# Patient Record
Sex: FEMALE | Race: WHITE | NOT HISPANIC OR LATINO | Employment: PART TIME | URBAN - METROPOLITAN AREA
[De-identification: names, ages, dates, MRNs, and addresses within clinical notes are randomized per-mention and may not be internally consistent; named-entity substitution may affect disease eponyms.]

---

## 2018-09-25 ENCOUNTER — TRANSCRIBE ORDERS (OUTPATIENT)
Dept: ADMINISTRATIVE | Facility: HOSPITAL | Age: 48
End: 2018-09-25

## 2018-09-25 DIAGNOSIS — F33.1 MAJOR DEPRESSIVE DISORDER, RECURRENT EPISODE, MODERATE (HCC): Primary | ICD-10-CM

## 2018-09-25 DIAGNOSIS — Z79.899 DRUG THERAPY: ICD-10-CM

## 2018-11-02 LAB — HBA1C MFR BLD HPLC: 5.6 %

## 2018-11-12 ENCOUNTER — TRANSCRIBE ORDERS (OUTPATIENT)
Dept: ADMINISTRATIVE | Facility: HOSPITAL | Age: 48
End: 2018-11-12

## 2018-11-12 DIAGNOSIS — Z12.39 SCREENING BREAST EXAMINATION: Primary | ICD-10-CM

## 2018-12-21 ENCOUNTER — HOSPITAL ENCOUNTER (EMERGENCY)
Facility: HOSPITAL | Age: 48
Discharge: HOME | End: 2018-12-21
Attending: EMERGENCY MEDICINE | Admitting: EMERGENCY MEDICINE
Payer: COMMERCIAL

## 2018-12-21 ENCOUNTER — APPOINTMENT (EMERGENCY)
Dept: RADIOLOGY | Facility: HOSPITAL | Age: 48
End: 2018-12-21
Payer: COMMERCIAL

## 2018-12-21 VITALS
TEMPERATURE: 97.6 F | DIASTOLIC BLOOD PRESSURE: 52 MMHG | HEART RATE: 71 BPM | WEIGHT: 122 LBS | RESPIRATION RATE: 20 BRPM | OXYGEN SATURATION: 99 % | SYSTOLIC BLOOD PRESSURE: 120 MMHG

## 2018-12-21 DIAGNOSIS — M54.9 BACK PAIN: Primary | ICD-10-CM

## 2018-12-21 LAB
ALBUMIN SERPL BCP-MCNC: 3.6 G/DL (ref 3.5–5)
ALP SERPL-CCNC: 44 U/L (ref 46–116)
ALT SERPL W P-5'-P-CCNC: 22 U/L (ref 12–78)
ANION GAP SERPL CALCULATED.3IONS-SCNC: 9 MMOL/L (ref 4–13)
AST SERPL W P-5'-P-CCNC: 17 U/L (ref 5–45)
BASOPHILS # BLD AUTO: 0.02 THOUSANDS/ΜL (ref 0–0.1)
BASOPHILS NFR BLD AUTO: 0 % (ref 0–1)
BILIRUB DIRECT SERPL-MCNC: 0.1 MG/DL (ref 0–0.2)
BILIRUB SERPL-MCNC: 0.3 MG/DL (ref 0.2–1)
BUN SERPL-MCNC: 17 MG/DL (ref 5–25)
CALCIUM SERPL-MCNC: 9.2 MG/DL (ref 8.3–10.1)
CHLORIDE SERPL-SCNC: 103 MMOL/L (ref 100–108)
CO2 SERPL-SCNC: 26 MMOL/L (ref 21–32)
CREAT SERPL-MCNC: 0.87 MG/DL (ref 0.6–1.3)
EOSINOPHIL # BLD AUTO: 0.12 THOUSAND/ΜL (ref 0–0.61)
EOSINOPHIL NFR BLD AUTO: 2 % (ref 0–6)
ERYTHROCYTE [DISTWIDTH] IN BLOOD BY AUTOMATED COUNT: 12.2 % (ref 11.6–15.1)
GFR SERPL CREATININE-BSD FRML MDRD: 78 ML/MIN/1.73SQ M
GLUCOSE SERPL-MCNC: 99 MG/DL (ref 65–140)
HCT VFR BLD AUTO: 40.3 % (ref 34.8–46.1)
HGB BLD-MCNC: 13.4 G/DL (ref 11.5–15.4)
IMM GRANULOCYTES # BLD AUTO: 0.02 THOUSAND/UL (ref 0–0.2)
IMM GRANULOCYTES NFR BLD AUTO: 0 % (ref 0–2)
LIPASE SERPL-CCNC: 385 U/L (ref 73–393)
LYMPHOCYTES # BLD AUTO: 1.3 THOUSANDS/ΜL (ref 0.6–4.47)
LYMPHOCYTES NFR BLD AUTO: 21 % (ref 14–44)
MAGNESIUM SERPL-MCNC: 1.9 MG/DL (ref 1.6–2.6)
MCH RBC QN AUTO: 32.4 PG (ref 26.8–34.3)
MCHC RBC AUTO-ENTMCNC: 33.3 G/DL (ref 31.4–37.4)
MCV RBC AUTO: 97 FL (ref 82–98)
MONOCYTES # BLD AUTO: 0.59 THOUSAND/ΜL (ref 0.17–1.22)
MONOCYTES NFR BLD AUTO: 9 % (ref 4–12)
NEUTROPHILS # BLD AUTO: 4.26 THOUSANDS/ΜL (ref 1.85–7.62)
NEUTS SEG NFR BLD AUTO: 68 % (ref 43–75)
NRBC BLD AUTO-RTO: 0 /100 WBCS
PLATELET # BLD AUTO: 217 THOUSANDS/UL (ref 149–390)
PMV BLD AUTO: 9.9 FL (ref 8.9–12.7)
POTASSIUM SERPL-SCNC: 4.6 MMOL/L (ref 3.5–5.3)
PROT SERPL-MCNC: 6.8 G/DL (ref 6.4–8.2)
RBC # BLD AUTO: 4.14 MILLION/UL (ref 3.81–5.12)
SODIUM SERPL-SCNC: 138 MMOL/L (ref 136–145)
TSH SERPL DL<=0.05 MIU/L-ACNC: 3.54 UIU/ML (ref 0.36–3.74)
WBC # BLD AUTO: 6.31 THOUSAND/UL (ref 4.31–10.16)

## 2018-12-21 PROCEDURE — 83735 ASSAY OF MAGNESIUM: CPT | Performed by: EMERGENCY MEDICINE

## 2018-12-21 PROCEDURE — 85025 COMPLETE CBC W/AUTO DIFF WBC: CPT | Performed by: EMERGENCY MEDICINE

## 2018-12-21 PROCEDURE — 74177 CT ABD & PELVIS W/CONTRAST: CPT

## 2018-12-21 PROCEDURE — 84443 ASSAY THYROID STIM HORMONE: CPT | Performed by: EMERGENCY MEDICINE

## 2018-12-21 PROCEDURE — 80076 HEPATIC FUNCTION PANEL: CPT | Performed by: EMERGENCY MEDICINE

## 2018-12-21 PROCEDURE — 71260 CT THORAX DX C+: CPT

## 2018-12-21 PROCEDURE — 36415 COLL VENOUS BLD VENIPUNCTURE: CPT | Performed by: EMERGENCY MEDICINE

## 2018-12-21 PROCEDURE — 80048 BASIC METABOLIC PNL TOTAL CA: CPT | Performed by: EMERGENCY MEDICINE

## 2018-12-21 PROCEDURE — 83690 ASSAY OF LIPASE: CPT | Performed by: EMERGENCY MEDICINE

## 2018-12-21 PROCEDURE — 96360 HYDRATION IV INFUSION INIT: CPT

## 2018-12-21 PROCEDURE — 99284 EMERGENCY DEPT VISIT MOD MDM: CPT

## 2018-12-21 RX ORDER — NAPROXEN 500 MG/1
500 TABLET ORAL 2 TIMES DAILY WITH MEALS
Qty: 10 TABLET | Refills: 0 | Status: SHIPPED | OUTPATIENT
Start: 2018-12-21 | End: 2018-12-30

## 2018-12-21 RX ORDER — CLONIDINE HYDROCHLORIDE 0.1 MG/1
0.1 TABLET ORAL AS NEEDED
COMMUNITY
End: 2020-02-24

## 2018-12-21 RX ORDER — NAPROXEN 500 MG/1
500 TABLET ORAL ONCE
Status: COMPLETED | OUTPATIENT
Start: 2018-12-21 | End: 2018-12-21

## 2018-12-21 RX ORDER — TRAMADOL HYDROCHLORIDE 50 MG/1
50 TABLET ORAL EVERY 6 HOURS PRN
Qty: 10 TABLET | Refills: 0 | Status: SHIPPED | OUTPATIENT
Start: 2018-12-21 | End: 2018-12-24

## 2018-12-21 RX ADMIN — IOHEXOL 100 ML: 350 INJECTION, SOLUTION INTRAVENOUS at 10:33

## 2018-12-21 RX ADMIN — SODIUM CHLORIDE 1000 ML: 0.9 INJECTION, SOLUTION INTRAVENOUS at 08:38

## 2018-12-21 RX ADMIN — NAPROXEN 500 MG: 500 TABLET ORAL at 11:35

## 2018-12-21 NOTE — DISCHARGE INSTRUCTIONS
Acute Low Back Pain   WHAT YOU NEED TO KNOW:   Acute low back pain is sudden discomfort in your lower back area that lasts for up to 6 weeks  The discomfort makes it difficult to tolerate activity  DISCHARGE INSTRUCTIONS:   Seek care immediately or call 911 if:   · You have severe pain  · You have sudden stiffness and heaviness on both buttocks down to both legs  · You have numbness or weakness in one leg, or pain in both legs  · You have numbness in your genital area or across your lower back  · You cannot control your urine or bowel movements  Contact your healthcare provider if:   · You have a fever  · You have pain at night or when you rest     · Your pain does not get better with treatment  · You have pain that worsens when you cough or sneeze  · You suddenly feel something pop or snap in your back  · You have questions or concerns about your condition or care  Medicines: The following medicines may be ordered by your healthcare provider:  · Acetaminophen  decreases pain  It is available without a doctor's order  Ask how much to take and how often to take it  Follow directions  Acetaminophen can cause liver damage if not taken correctly  · NSAIDs  help decrease swelling and pain  This medicine is available with or without a doctor's order  NSAIDs can cause stomach bleeding or kidney problems in certain people  If you take blood thinner medicine, always ask your healthcare provider if NSAIDs are safe for you  Always read the medicine label and follow directions  · Prescription pain medicine  may be given  Ask your healthcare provider how to take this medicine safely  · Muscle relaxers  decrease pain by relaxing the muscles in your lower spine  · Take your medicine as directed  Contact your healthcare provider if you think your medicine is not helping or if you have side effects  Tell him of her if you are allergic to any medicine   Keep a list of the medicines, vitamins, and herbs you take  Include the amounts, and when and why you take them  Bring the list or the pill bottles to follow-up visits  Carry your medicine list with you in case of an emergency  Self-care:   · Stay active  as much as you can without causing more pain  Bed rest could make your back pain worse  Start with some light exercises such as walking  Avoid heavy lifting until your pain is gone  Ask for more information about the activities or exercises that are right for you  · Ice  helps decrease swelling, pain, and muscle spams  Put crushed ice in a plastic bag  Cover it with a towel  Place it on your lower back for 20 to 30 minutes every 2 hours  Do this for about 2 to 3 days after your pain starts, or as directed  · Heat  helps decrease pain and muscle spasms  Start to use heat after treatment with ice has stopped  Use a small towel dampened with warm water or a heating pad, or sit in a warm bath  Apply heat on the area for 20 to 30 minutes every 2 hours for as many days as directed  Alternate heat and ice  Prevent acute low back pain:   · Use proper body mechanics  ¨ Bend at the hips and knees when you  objects  Do not bend from the waist  Use your leg muscles as you lift the load  Do not use your back  Keep the object close to your chest as you lift it  Try not to twist or lift anything above your waist     ¨ Change your position often when you stand for long periods of time  Rest one foot on a small box or footrest, and then switch to the other foot often  ¨ Try not to sit for long periods of time  When you do, sit in a straight-backed chair with your feet flat on the floor  Never reach, pull, or push while you are sitting  · Do exercises that strengthen your back muscles  Warm up before you exercise  Ask your healthcare provider the best exercises for you  · Maintain a healthy weight  Ask your healthcare provider how much you should weigh   Ask him to help you create a weight loss plan if you are overweight  Follow up with your healthcare provider as directed:  Return for a follow-up visit if you still have pain after 1 to 3 weeks of treatment  You may need to visit an orthopedist if your back pain lasts more than 6 to 12 weeks  Write down your questions so you remember to ask them during your visits  © 2017 [address] Information is for End User's use only and may not be sold, redistributed or otherwise used for commercial purposes  All illustrations and images included in CareNotes® are the copyrighted property of A D A M [employer] Inc  or Franco Shah  The above information is an  only  It is not intended as medical advice for individual conditions or treatments  Talk to your doctor, nurse or pharmacist before following any medical regimen to see if it is safe and effective for you  Back Pain   WHAT YOU NEED TO KNOW:   Back pain is common  It can be caused by many conditions, such as arthritis or the breakdown of spinal discs  Your risk for back pain is increased by injuries, lack of activity, or repeated bending and twisting  You may feel sore or stiff on one or both sides of your back  The pain may spread to your buttocks or thighs  DISCHARGE INSTRUCTIONS:   Medicines:   · NSAIDs  help decrease swelling and pain  This medicine is available with or without a doctor's order  NSAIDs can cause stomach bleeding or kidney problems in certain people  If you take blood thinner medicine, always ask your healthcare provider if NSAIDs are safe for you  Always read the medicine label and follow directions  · Acetaminophen  decreases pain  It is available without a doctor's order  Ask how much to take and how often to take it  Follow directions  Acetaminophen can cause liver damage if not taken correctly  · Prescription pain medicine  may be given  Ask your healthcare provider how to take this medicine safely      · Take your medicine as directed  Contact your healthcare provider if you think your medicine is not helping or if you have side effects  Tell him or her if you are allergic to any medicine  Keep a list of the medicines, vitamins, and herbs you take  Include the amounts, and when and why you take them  Bring the list or the pill bottles to follow-up visits  Carry your medicine list with you in case of an emergency  Follow up with your healthcare provider in 2 weeks, or as directed:  Write down your questions so you remember to ask them during your visits  How to manage your back pain:   · Apply ice  on your back or affected area for 15 to 20 minutes every hour or as directed  Use an ice pack, or put crushed ice in a plastic bag  Cover it with a towel  Ice helps prevent tissue damage and decreases pain  · Apply heat  on your back or affected area for 20 to 30 minutes every 2 hours for as many days as directed  Heat helps decrease pain and muscle spasms  · Stay active  as much as you can without causing more pain  Bed rest could make your back pain worse  Avoid heavy lifting until your pain is gone  Return to the emergency department if:   · You have pain, numbness, or weakness in one or both legs  · Your pain becomes so severe that you cannot walk  · You cannot control your urine or bowel movements  · You have severe back pain with chest pain  · You have severe back pain, nausea, and vomiting  · You have severe back pain that spreads to your side or genital area  Contact your healthcare provider if:   · You have back pain that does not get better with rest and pain medicine  · You have a fever  · You have pain that worsens when you are on your back or when you rest     · You have pain that worsens when you cough or sneeze  · You lose weight without trying  · You have questions or concerns about your condition or care    © 2017 [address] Information is for End User's use only and may not be sold, redistributed or otherwise used for commercial purposes  All illustrations and images included in CareNotes® are the copyrighted property of A D A M [employer] Inc  or Franco Shah  The above information is an  only  It is not intended as medical advice for individual conditions or treatments  Talk to your doctor, nurse or pharmacist before following any medical regimen to see if it is safe and effective for you

## 2018-12-21 NOTE — ED PROVIDER NOTES
History  Chief Complaint   Patient presents with    Weight Loss     pt presents to ed with weight loss, back pain and chronic diarrhea for over a month     Back Pain     40-year-old female presents with multiple complaints including lower back pain weight loss for 40 lb for the past few months without trying, chills and night sweats at night  She has seen her family doctor who obtained blood work and said nothing was unremarkable  She is not happy with those answers  She has a history of 2 benign lung tumors biopsied  She is worried that she has cancer  She has not had mammogram or colonoscopy yet  Denies any dark tarry stools bloody stools nausea vomiting chest pain shortness of breath or any other symptoms  History provided by:  Patient   used: No        Prior to Admission Medications   Prescriptions Last Dose Informant Patient Reported? Taking? cloNIDine (CATAPRES) 0 1 mg tablet   Yes Yes   Sig: Take 0 2 mg by mouth every 12 (twelve) hours      Facility-Administered Medications: None       Past Medical History:   Diagnosis Date    Hodgkin lymphoma ([facility] [address] )     Hypertension        History reviewed  No pertinent surgical history  History reviewed  No pertinent family history  I have reviewed and agree with the history as documented  Social History   Substance Use Topics    Smoking status: Current Every Day Smoker     Packs/day: 0 20    Smokeless tobacco: Not on file    Alcohol use No        Review of Systems   All other systems reviewed and are negative        Physical Exam  Physical Exam    Vital Signs  ED Triage Vitals [12/21/18 0756]   Temperature Pulse Respirations Blood Pressure SpO2   97 8 °F (36 6 °C) 75 18 115/53 99 %      Temp Source Heart Rate Source Patient Position - Orthostatic VS BP Location FiO2 (%)   Tympanic Monitor -- -- --      Pain Score       --           Vitals:    12/21/18 0756 12/21/18 1138   BP: 115/53 120/52   Pulse: 75 71       Visual Acuity      ED Medications  Medications   sodium chloride 0 9 % bolus 1,000 mL (0 mL Intravenous Stopped 12/21/18 0938)   iohexol (OMNIPAQUE) 350 MG/ML injection (MULTI-DOSE) 100 mL (100 mL Intravenous Given 12/21/18 1033)   naproxen (NAPROSYN) tablet 500 mg (500 mg Oral Given 12/21/18 1135)       Diagnostic Studies  Results Reviewed     Procedure Component Value Units Date/Time    Hepatic function panel [[id number]]  (Abnormal) Collected:  12/21/18 0838    Lab Status:  Final result Specimen:  Blood from Arm, Left Updated:  12/21/18 0919     Total Bilirubin 0 30 mg/dL      Bilirubin, Direct 0 10 mg/dL      Alkaline Phosphatase 44 (L) U/L      AST 17 U/L      ALT 22 U/L      Total Protein 6 8 g/dL      Albumin 3 6 g/dL     Magnesium [[id number]]  (Normal) Collected:  12/21/18 0838    Lab Status:  Final result Specimen:  Blood from Arm, Left Updated:  12/21/18 0919     Magnesium 1 9 mg/dL     Lipase [[id number]]  (Normal) Collected:  12/21/18 9540    Lab Status:  Final result Specimen:  Blood from Arm, Left Updated:  12/21/18 0919     Lipase 385 u/L     TSH [[id number]]  (Normal) Collected:  12/21/18 1050    Lab Status:  Final result Specimen:  Blood from Arm, Left Updated:  12/21/18 0919     TSH 3RD GENERATON 3 537 uIU/mL     Narrative:         Patients undergoing fluorescein dye angiography may retain small amounts of fluorescein in the body for 48-72 hours post procedure  Samples containing fluorescein can produce falsely depressed TSH values  If the patient had this procedure,a specimen should be resubmitted post fluorescein clearance            The recommended reference ranges for TSH during pregnancy are as follows:  First trimester 0 1 to 2 5 uIU/mL  Second trimester  0 2 to 3 0 uIU/mL  Third trimester 0 3 to 3 0 uIU/m      Basic metabolic panel [[id number]] Collected:  12/21/18 0838    Lab Status:  Final result Specimen:  Blood from Arm, Left Updated:  12/21/18 0911     Sodium 138 mmol/L      Potassium 4 6 mmol/L Chloride 103 mmol/L      CO2 26 mmol/L      ANION GAP 9 mmol/L      BUN 17 mg/dL      Creatinine 0 87 mg/dL      Glucose 99 mg/dL      Calcium 9 2 mg/dL      eGFR 78 ml/min/1 73sq m     Narrative:         National Kidney Disease Education Program recommendations are as follows:  GFR calculation is accurate only with a steady state creatinine  Chronic Kidney disease less than 60 ml/min/1 73 sq  meters  Kidney failure less than 15 ml/min/1 73 sq  meters  CBC and differential [[id number]] Collected:  12/21/18 0838    Lab Status:  Final result Specimen:  Blood from Arm, Left Updated:  12/21/18 0844     WBC 6 31 Thousand/uL      RBC 4 14 Million/uL      Hemoglobin 13 4 g/dL      Hematocrit 40 3 %      MCV 97 fL      MCH 32 4 pg      MCHC 33 3 g/dL      RDW 12 2 %      MPV 9 9 fL      Platelets 774 Thousands/uL      nRBC 0 /100 WBCs      Neutrophils Relative 68 %      Immat GRANS % 0 %      Lymphocytes Relative 21 %      Monocytes Relative 9 %      Eosinophils Relative 2 %      Basophils Relative 0 %      Neutrophils Absolute 4 26 Thousands/µL      Immature Grans Absolute 0 02 Thousand/uL      Lymphocytes Absolute 1 30 Thousands/µL      Monocytes Absolute 0 59 Thousand/µL      Eosinophils Absolute 0 12 Thousand/µL      Basophils Absolute 0 02 Thousands/µL                  CT chest abdomen pelvis w contrast   Final Result by Katelynn Weeks MD (12/21 1104)      No obvious cause for weight loss  Apparent mild perihilar scarring of uncertain etiology  Minimal lung nodularity  Under current guidelines, tiny nodule such as this do not specifically require any surveillance, however, if the patient has very high risk factor for malignancy, one-year follow-up chest CT might be helpful  Chronic appearing findings of the lower lumbar spine and sacrum as mentioned above for which follow-up MRI might be helpful for better characterization        Relatively small area of diminished enhancement of the right kidney which does not appear masslike and might represent a scar  Tiny fat-containing umbilical hernia            Workstation performed: [id number]                    Procedures  Procedures       Phone Contacts  ED Phone Contact    ED Course                               MDM  Number of Diagnoses or Management Options  Back pain:   Diagnosis management comments: Patient evaluated with labs, Imaging, UA and I reviewed the results and discussed with the patient  Given pain medications and patient improved with symptoms  Discharged with follow up, appropriate instructions and medications and patient verbalized understanding and had no further questions at the time of discharge  Patient well appearing and had stable vital signs at discharge  Amount and/or Complexity of Data Reviewed  Clinical lab tests: ordered and reviewed  Tests in the radiology section of CPT®: ordered and reviewed  Tests in the medicine section of CPT®: ordered and reviewed    Patient Progress  Patient progress: stable    CritCare Time    Disposition  Final diagnoses:   Back pain     Time reflects when diagnosis was documented in both MDM as applicable and the Disposition within this note     Time User Action Codes Description Comment    12/21/2018 11:27 AM Luz Pacheco Add [M54 9] Back pain       ED Disposition     ED Disposition Condition Comment    Discharge  [address] [facility] [address] discharge to home/self care  Condition at discharge: Stable        Follow-up Information     Follow up With Specialties Details Why Contact Info Additional [address] Kalli MD Internal Medicine Schedule an appointment as soon as possible for a visit  [address] [address] [facility] [address] Emergency Department Emergency Medicine  If symptoms worsen [address] [facility] [address]  [phone] [facility], [place], [place], [address]          Discharge Medication List as of 12/21/2018 11:28 AM      START taking these medications    Details   naproxen (NAPROSYN) 500 mg tablet Take 1 tablet (500 mg total) by mouth 2 (two) times a day with meals for 5 days, Starting Fri 12/21/2018, Until Wed 12/26/2018, Print      traMADol (ULTRAM) 50 mg tablet Take 1 tablet (50 mg total) by mouth every 6 (six) hours as needed for moderate pain for up to 3 days, Starting Fri 12/21/2018, Until Mon 12/24/2018, Print         CONTINUE these medications which have NOT CHANGED    Details   cloNIDine (CATAPRES) 0 1 mg tablet Take 0 2 mg by mouth every 12 (twelve) hours, Historical Med           No discharge procedures on file      ED Provider  Electronically Signed by           Arnulfo Hinojosa DO  12/23/18 5804

## 2018-12-30 ENCOUNTER — HOSPITAL ENCOUNTER (EMERGENCY)
Facility: HOSPITAL | Age: 48
Discharge: HOME | End: 2018-12-30
Attending: EMERGENCY MEDICINE | Admitting: EMERGENCY MEDICINE
Payer: COMMERCIAL

## 2018-12-30 ENCOUNTER — APPOINTMENT (EMERGENCY)
Dept: RADIOLOGY | Facility: HOSPITAL | Age: 48
End: 2018-12-30
Payer: COMMERCIAL

## 2018-12-30 VITALS
HEART RATE: 117 BPM | DIASTOLIC BLOOD PRESSURE: 65 MMHG | WEIGHT: 118 LBS | SYSTOLIC BLOOD PRESSURE: 128 MMHG | OXYGEN SATURATION: 100 % | RESPIRATION RATE: 18 BRPM | TEMPERATURE: 97.7 F

## 2018-12-30 DIAGNOSIS — R19.7 DIARRHEA: ICD-10-CM

## 2018-12-30 DIAGNOSIS — M54.9 CHRONIC BACK PAIN: ICD-10-CM

## 2018-12-30 DIAGNOSIS — R10.9 ABDOMINAL PAIN: Primary | ICD-10-CM

## 2018-12-30 DIAGNOSIS — F41.9 ANXIETY: ICD-10-CM

## 2018-12-30 DIAGNOSIS — G89.29 CHRONIC BACK PAIN: ICD-10-CM

## 2018-12-30 DIAGNOSIS — Z76.5 DRUG-SEEKING BEHAVIOR: ICD-10-CM

## 2018-12-30 LAB
ALBUMIN SERPL BCP-MCNC: 4.8 G/DL (ref 3.5–5)
ALP SERPL-CCNC: 60 U/L (ref 46–116)
ALT SERPL W P-5'-P-CCNC: 26 U/L (ref 12–78)
AMPHETAMINES SERPL QL SCN: NEGATIVE
ANION GAP SERPL CALCULATED.3IONS-SCNC: 11 MMOL/L (ref 4–13)
AST SERPL W P-5'-P-CCNC: 47 U/L (ref 5–45)
BACTERIA UR QL AUTO: ABNORMAL /HPF
BARBITURATES UR QL: NEGATIVE
BASOPHILS # BLD AUTO: 0.03 THOUSANDS/ΜL (ref 0–0.1)
BASOPHILS NFR BLD AUTO: 1 % (ref 0–1)
BENZODIAZ UR QL: POSITIVE
BILIRUB SERPL-MCNC: 0.7 MG/DL (ref 0.2–1)
BILIRUB UR QL STRIP: NEGATIVE
BUN SERPL-MCNC: 10 MG/DL (ref 5–25)
CALCIUM SERPL-MCNC: 10.2 MG/DL (ref 8.3–10.1)
CHLORIDE SERPL-SCNC: 100 MMOL/L (ref 100–108)
CLARITY UR: ABNORMAL
CO2 SERPL-SCNC: 29 MMOL/L (ref 21–32)
COCAINE UR QL: NEGATIVE
COLOR UR: YELLOW
CREAT SERPL-MCNC: 0.82 MG/DL (ref 0.6–1.3)
EOSINOPHIL # BLD AUTO: 0.07 THOUSAND/ΜL (ref 0–0.61)
EOSINOPHIL NFR BLD AUTO: 1 % (ref 0–6)
ERYTHROCYTE [DISTWIDTH] IN BLOOD BY AUTOMATED COUNT: 12.3 % (ref 11.6–15.1)
ETHANOL SERPL-MCNC: <3 MG/DL (ref 0–3)
EXT PREG TEST URINE: NEGATIVE
GFR SERPL CREATININE-BSD FRML MDRD: 84 ML/MIN/1.73SQ M
GLUCOSE SERPL-MCNC: 95 MG/DL (ref 65–140)
GLUCOSE UR STRIP-MCNC: NEGATIVE MG/DL
HCT VFR BLD AUTO: 50.7 % (ref 34.8–46.1)
HGB BLD-MCNC: 16.8 G/DL (ref 11.5–15.4)
HGB UR QL STRIP.AUTO: ABNORMAL
IMM GRANULOCYTES # BLD AUTO: 0.01 THOUSAND/UL (ref 0–0.2)
IMM GRANULOCYTES NFR BLD AUTO: 0 % (ref 0–2)
KETONES UR STRIP-MCNC: NEGATIVE MG/DL
LEUKOCYTE ESTERASE UR QL STRIP: NEGATIVE
LYMPHOCYTES # BLD AUTO: 1.35 THOUSANDS/ΜL (ref 0.6–4.47)
LYMPHOCYTES NFR BLD AUTO: 23 % (ref 14–44)
MAGNESIUM SERPL-MCNC: 2.1 MG/DL (ref 1.6–2.6)
MCH RBC QN AUTO: 32.1 PG (ref 26.8–34.3)
MCHC RBC AUTO-ENTMCNC: 33.1 G/DL (ref 31.4–37.4)
MCV RBC AUTO: 97 FL (ref 82–98)
METHADONE UR QL: NEGATIVE
MONOCYTES # BLD AUTO: 0.52 THOUSAND/ΜL (ref 0.17–1.22)
MONOCYTES NFR BLD AUTO: 9 % (ref 4–12)
NEUTROPHILS # BLD AUTO: 3.89 THOUSANDS/ΜL (ref 1.85–7.62)
NEUTS SEG NFR BLD AUTO: 66 % (ref 43–75)
NITRITE UR QL STRIP: NEGATIVE
NON-SQ EPI CELLS URNS QL MICRO: ABNORMAL /HPF
NRBC BLD AUTO-RTO: 0 /100 WBCS
OPIATES UR QL SCN: NEGATIVE
PCP UR QL: NEGATIVE
PH UR STRIP.AUTO: 7 [PH] (ref 5–9)
PHOSPHATE SERPL-MCNC: 3.1 MG/DL (ref 2.7–4.5)
PLATELET # BLD AUTO: 251 THOUSANDS/UL (ref 149–390)
PMV BLD AUTO: 9.9 FL (ref 8.9–12.7)
POTASSIUM SERPL-SCNC: 5.6 MMOL/L (ref 3.5–5.3)
PROT SERPL-MCNC: 9.7 G/DL (ref 6.4–8.2)
PROT UR STRIP-MCNC: NEGATIVE MG/DL
RBC # BLD AUTO: 5.24 MILLION/UL (ref 3.81–5.12)
RBC #/AREA URNS AUTO: ABNORMAL /HPF
SODIUM SERPL-SCNC: 140 MMOL/L (ref 136–145)
SP GR UR STRIP.AUTO: 1.01 (ref 1–1.03)
THC UR QL: NEGATIVE
UROBILINOGEN UR QL STRIP.AUTO: 0.2 E.U./DL
WBC # BLD AUTO: 5.87 THOUSAND/UL (ref 4.31–10.16)
WBC #/AREA URNS AUTO: ABNORMAL /HPF

## 2018-12-30 PROCEDURE — 80307 DRUG TEST PRSMV CHEM ANLYZR: CPT | Performed by: EMERGENCY MEDICINE

## 2018-12-30 PROCEDURE — 74022 RADEX COMPL AQT ABD SERIES: CPT

## 2018-12-30 PROCEDURE — 85025 COMPLETE CBC W/AUTO DIFF WBC: CPT | Performed by: EMERGENCY MEDICINE

## 2018-12-30 PROCEDURE — 80053 COMPREHEN METABOLIC PANEL: CPT | Performed by: EMERGENCY MEDICINE

## 2018-12-30 PROCEDURE — 96374 THER/PROPH/DIAG INJ IV PUSH: CPT

## 2018-12-30 PROCEDURE — 81001 URINALYSIS AUTO W/SCOPE: CPT | Performed by: EMERGENCY MEDICINE

## 2018-12-30 PROCEDURE — 93005 ELECTROCARDIOGRAM TRACING: CPT

## 2018-12-30 PROCEDURE — 81025 URINE PREGNANCY TEST: CPT | Performed by: EMERGENCY MEDICINE

## 2018-12-30 PROCEDURE — 99284 EMERGENCY DEPT VISIT MOD MDM: CPT

## 2018-12-30 PROCEDURE — 80320 DRUG SCREEN QUANTALCOHOLS: CPT | Performed by: EMERGENCY MEDICINE

## 2018-12-30 PROCEDURE — 96361 HYDRATE IV INFUSION ADD-ON: CPT

## 2018-12-30 PROCEDURE — 36415 COLL VENOUS BLD VENIPUNCTURE: CPT | Performed by: EMERGENCY MEDICINE

## 2018-12-30 PROCEDURE — 83735 ASSAY OF MAGNESIUM: CPT | Performed by: EMERGENCY MEDICINE

## 2018-12-30 PROCEDURE — 84100 ASSAY OF PHOSPHORUS: CPT | Performed by: EMERGENCY MEDICINE

## 2018-12-30 RX ORDER — METHYLPREDNISOLONE 4 MG/1
TABLET ORAL
Qty: 21 TABLET | Refills: 0 | Status: SHIPPED | OUTPATIENT
Start: 2018-12-30 | End: 2019-02-22

## 2018-12-30 RX ORDER — KETOROLAC TROMETHAMINE 30 MG/ML
15 INJECTION, SOLUTION INTRAMUSCULAR; INTRAVENOUS ONCE
Status: COMPLETED | OUTPATIENT
Start: 2018-12-30 | End: 2018-12-30

## 2018-12-30 RX ORDER — CYCLOBENZAPRINE HCL 10 MG
10 TABLET ORAL 2 TIMES DAILY PRN
Qty: 20 TABLET | Refills: 0 | Status: SHIPPED | OUTPATIENT
Start: 2018-12-30 | End: 2019-02-22

## 2018-12-30 RX ORDER — ALPRAZOLAM 1 MG/1
TABLET ORAL 2 TIMES DAILY PRN
COMMUNITY
End: 2019-11-13 | Stop reason: DRUGHIGH

## 2018-12-30 RX ADMIN — SODIUM CHLORIDE 1000 ML: 0.9 INJECTION, SOLUTION INTRAVENOUS at 12:14

## 2018-12-30 RX ADMIN — KETOROLAC TROMETHAMINE 15 MG: 30 INJECTION, SOLUTION INTRAMUSCULAR at 12:17

## 2018-12-30 NOTE — ED NOTES
12:20 - Spoke with pt  Pt reported she is feeling anxious specifically about her medical health  Pt interested in outpatient services  Provided her with crisis number as well as outpatient services      Christa Wong MA  Crisis Intervention Worker  12/30/18

## 2018-12-30 NOTE — DISCHARGE INSTRUCTIONS
Please take a list of all of your medications and discharge paperwork with you to all of your follow-up medical visits  Please take all of your medications as directed  Please call your family doctor or return to the ER if you have increased shortness of breath, chest pain, fevers, chills, nausea, vomiting, diarrhea, or any other worsening symptoms  Abdominal Pain   AMBULATORY CARE:   Abdominal pain  can be dull, achy, or sharp  You may have pain in one area of your abdomen, or in your entire abdomen  Your pain may be caused by a condition such as constipation, food sensitivity or poisoning, infection, or a blockage  Abdominal pain can also be from a hernia, appendicitis, or an ulcer  Liver, gallbladder, or kidney conditions can also cause abdominal pain  The cause of your abdominal pain may be unknown  Seek care immediately if:   · You have new chest pain or shortness of breath  · You have pulsing pain in your upper abdomen or lower back that suddenly becomes constant  · Your pain is in the right lower abdominal area and worsens with movement  · You have a fever over 100 4°F (38°C) or shaking chills  · You are vomiting and cannot keep food or liquids down  · Your pain does not improve or gets worse over the next 8 to 12 hours  · You see blood in your vomit or bowel movements, or they look black and tarry  · Your skin or the whites of your eyes turn yellow  · You are a woman and have a large amount of vaginal bleeding that is not your monthly period  Contact your healthcare provider if:   · You have pain in your lower back  · You are a man and have pain in your testicles  · You have pain when you urinate  · You have questions or concerns about your condition or care  Treatment for abdominal pain  may include medicine to calm your stomach, prevent vomiting, or decrease pain    Follow up with your healthcare provider as directed:  Write down your questions so you remember to ask them during your visits  © 2017 [address] Information is for End User's use only and may not be sold, redistributed or otherwise used for commercial purposes  All illustrations and images included in CareNotes® are the copyrighted property of A D A M , Inc  or Franco Shah  The above information is an  only  It is not intended as medical advice for individual conditions or treatments  Talk to your doctor, nurse or pharmacist before following any medical regimen to see if it is safe and effective for you  Anxiety, Ambulatory Care   GENERAL INFORMATION:   Anxiety  is a condition that causes you to feel excessive worry, uneasiness, or fear  Family or work stress, smoking, caffeine, and alcohol can increase your risk for anxiety  Certain medicines or health conditions can also increase your risk  Anxiety may begin gradually and can become a long-term condition if it is not managed or treated  Common symptoms include the following:   · Fatigue or muscle tightness     · Shaking, restlessness, or irritability     · Problems focusing     · Trouble sleeping     · Feeling jumpy, easily startled, or dizzy     · Rapid heartbeat or shortness of breath  Seek immediate care for the following symptoms:   · Chest pain, tightness, or heaviness that may spread to your shoulders, arms, jaw, neck, or back    · Feeling like hurting yourself or someone else    · Dizziness or feeling lightheaded or faint  Treatment for anxiety  may include medicines to help you feel calm and relaxed, and decrease your symptoms  Healthcare providers will treat any medical conditions that may be causing your symptoms  Manage anxiety:   · Go to counseling as directed  Cognitive behavioral therapy can help you understand and change how you react to events that trigger your symptoms  · Find ways to manage your symptoms    Activities such as exercise, meditation, or listening to music can help you relax  · Practice deep breathing  Breathing can change how your body reacts to stress  Focus on taking slow, deep breaths several times a day, or during an anxiety attack  Breathe in through your nose, and out through your mouth  · Avoid caffeine  Caffeine can make your symptoms worse  Avoid foods or drinks that are meant to increase your energy level  · Limit or avoid alcohol  Ask your healthcare provider if alcohol is safe for you  You may not be able to drink alcohol if you take certain anxiety or depression medicines  Limit alcohol to 1 drink per day if you are a woman  Limit alcohol to 2 drinks per day if you are a man  A drink of alcohol is 12 ounces of beer, 5 ounces of wine, or 1½ ounces of liquor  Follow up with your healthcare provider as directed:  Write down your questions so you remember to ask them during your visits  CARE AGREEMENT:   You have the right to help plan your care  Learn about your health condition and how it may be treated  Discuss treatment options with your caregivers to decide what care you want to receive  You always have the right to refuse treatment  The above information is an  only  It is not intended as medical advice for individual conditions or treatments  Talk to your doctor, nurse or pharmacist before following any medical regimen to see if it is safe and effective for you  © 2014 [address] is for End User's use only and may not be sold, redistributed or otherwise used for commercial purposes  All illustrations and images included in CareNotes® are the copyrighted property of [employer]  or Franco Shah

## 2018-12-30 NOTE — ED NOTES
Attempted to insert iv once, only obtained blood which was sent  States they sometimes have to put ivs in my neck    made aware     Keerthi Em, EMMA  12/30/18 0878

## 2018-12-30 NOTE — ED PROVIDER NOTES
History  Chief Complaint   Patient presents with    Psychiatric Evaluation     states I'm just tried of not feeling good, states "my father and brother tried to commit suicide and my mother passed away this summer  I'm just can't take it anymore  Also c/o diarrhea no appetite, and having palpitations     50-year-old female with past medical history of chronic anxiety, chronic low back pain, Hodgkin's lymphoma, hypertension, depression, presents to the ER with complaint of generalized, intermittent abdominal pain and diarrhea over the past 3-4 days  Patient also states that she is sick and tired of dealing with her chronic low back pain  Patient denies any bowel/bladder incontinence/retention  Patient denies any saddle anesthesia  Patient states that she was seen by her PCP in the past for anxiety and depression however she she is not compliant with the medication prescribed to her  Patient now is feeling very depressed and intermittently has had thoughts of ending her life however she has no definite plan and she does not think she would never act on it  Patient denies any fevers, chills, nausea, vomiting, headaches, weakness, dizziness  Patient denies any lower extremity weakness  Patient is walking around the ED without any difficulty  Patient states that she has history of degenerative disc disease and was treated with tramadol last week for back pain which did not improve her pain  Patient has not followed up with Orthopedic surgery for her back pain  History provided by:  Patient  Psychiatric Evaluation   Presenting symptoms: no agitation    Associated symptoms: no abdominal pain, no anxiety, no appetite change, no chest pain and no headaches        Prior to Admission Medications   Prescriptions Last Dose Informant Patient Reported? Taking?    ALPRAZolam (XANAX) 1 mg tablet   Yes Yes   Sig: Take by mouth 2 (two) times a day as needed for anxiety   cloNIDine (CATAPRES) 0 1 mg tablet   Yes No Sig: Take 0 2 mg by mouth every 12 (twelve) hours      Facility-Administered Medications: None       Past Medical History:   Diagnosis Date    Hodgkin lymphoma ([facility] [address] )     Hypertension     Psychiatric disorder     anxiety, depression       Past Surgical History:   Procedure Laterality Date    BOWEL RESECTION         History reviewed  No pertinent family history  I have reviewed and agree with the history as documented  Social History   Substance Use Topics    Smoking status: Current Every Day Smoker     Packs/day: 0 20    Smokeless tobacco: Never Used    Alcohol use Yes      Comment: occassional        Review of Systems   Constitutional: Negative for activity change, appetite change, chills and fever  HENT: Negative for congestion and ear pain  Eyes: Negative for pain and discharge  Respiratory: Negative for cough, chest tightness, shortness of breath, wheezing and stridor  Cardiovascular: Negative for chest pain and palpitations  Gastrointestinal: Negative for abdominal distention, abdominal pain, constipation, diarrhea and nausea  Endocrine: Negative for cold intolerance  Genitourinary: Negative for dysuria, frequency and urgency  Musculoskeletal: Positive for back pain  Negative for arthralgias  Skin: Negative for color change and rash  Allergic/Immunologic: Negative for environmental allergies and food allergies  Neurological: Negative for dizziness, weakness, numbness and headaches  Hematological: Negative for adenopathy  Psychiatric/Behavioral: Negative for agitation, behavioral problems and confusion  The patient is not nervous/anxious  All other systems reviewed and are negative  Physical Exam  Physical Exam   Constitutional: She is oriented to person, place, and time  She appears well-developed and well-nourished  HENT:   Head: Normocephalic and atraumatic     Mouth/Throat: Oropharynx is clear and moist    Eyes: Conjunctivae and EOM are normal    Neck: Normal range of motion  Neck supple  Cardiovascular: Normal rate, regular rhythm, normal heart sounds and intact distal pulses  Pulmonary/Chest: Effort normal and breath sounds normal    Abdominal: Soft  Bowel sounds are normal  She exhibits no distension  There is no tenderness  Abdomen is soft, nontender, with bowel sounds present all 4 quadrant  No tenderness noted to palpation  Musculoskeletal: Normal range of motion  Neurological: She is alert and oriented to person, place, and time  Patient is alert and oriented x3  Visual field intact bilateral   Sensory and motor strength intact bilateral   No focal neuro deficits noted  Skin: Skin is warm and dry  Psychiatric: She has a normal mood and affect  Her behavior is normal  Judgment and thought content normal    Nursing note and vitals reviewed        Vital Signs  ED Triage Vitals   Temperature Pulse Respirations Blood Pressure SpO2   12/30/18 1022 12/30/18 1022 12/30/18 1022 12/30/18 1022 12/30/18 1022   97 7 °F (36 5 °C) (!) 117 18 128/65 100 %      Temp Source Heart Rate Source Patient Position - Orthostatic VS BP Location FiO2 (%)   12/30/18 1022 12/30/18 1022 12/30/18 1022 12/30/18 1022 --   Tympanic Monitor Sitting Right arm       Pain Score       12/30/18 1217       8           Vitals:    12/30/18 1022   BP: 128/65   Pulse: (!) 117   Patient Position - Orthostatic VS: Sitting       Visual Acuity      ED Medications  Medications   sodium chloride 0 9 % bolus 1,000 mL (0 mL Intravenous Stopped 12/30/18 1510)   ketorolac (TORADOL) injection 15 mg (15 mg Intravenous Given 12/30/18 1217)       Diagnostic Studies  Results Reviewed     Procedure Component Value Units Date/Time    Ethanol [[id number]]  (Normal) Collected:  12/30/18 1108    Lab Status:  Final result Specimen:  Blood from Arm, Right Updated:  12/30/18 1144     Ethanol Lvl <3 mg/dL     Urine Microscopic [[id number]]  (Abnormal) Collected:  12/30/18 1101    Lab Status:  Final result Specimen:  Urine from Urine, Clean Catch Updated:  12/30/18 1137     RBC, UA 4-10 (A) /hpf      WBC, UA 0-1 (A) /hpf      Epithelial Cells Innumerable (A) /hpf      Bacteria, UA Moderate (A) /hpf     Comprehensive metabolic panel [[id number]]  (Abnormal) Collected:  12/30/18 1108    Lab Status:  Final result Specimen:  Blood from Arm, Right Updated:  12/30/18 1132     Sodium 140 mmol/L      Potassium 5 6 (H) mmol/L      Chloride 100 mmol/L      CO2 29 mmol/L      ANION GAP 11 mmol/L      BUN 10 mg/dL      Creatinine 0 82 mg/dL      Glucose 95 mg/dL      Calcium 10 2 (H) mg/dL      AST 47 (H) U/L      ALT 26 U/L      Alkaline Phosphatase 60 U/L      Total Protein 9 7 (H) g/dL      Albumin 4 8 g/dL      Total Bilirubin 0 70 mg/dL      eGFR 84 ml/min/1 73sq m     Narrative:         National Kidney Disease Education Program recommendations are as follows:  GFR calculation is accurate only with a steady state creatinine  Chronic Kidney disease less than 60 ml/min/1 73 sq  meters  Kidney failure less than 15 ml/min/1 73 sq  meters  Magnesium [[id number]]  (Normal) Collected:  12/30/18 1108    Lab Status:  Final result Specimen:  Blood from Arm, Right Updated:  12/30/18 1132     Magnesium 2 1 mg/dL     Phosphorus [[id number]]  (Normal) Collected:  12/30/18 1108    Lab Status:  Final result Specimen:  Blood from Arm, Right Updated:  12/30/18 1132     Phosphorus 3 1 mg/dL     Rapid drug screen, urine [[id number]]  (Abnormal) Collected:  12/30/18 1100    Lab Status:  Final result Specimen:  Urine from Urine, Clean Catch Updated:  12/30/18 1124     Amph/Meth UR Negative     Barbiturate Ur Negative     Benzodiazepine Urine Positive (A)     Cocaine Urine Negative     Methadone Urine Negative     Opiate Urine Negative     PCP Ur Negative     THC Urine Negative    Narrative:         Presumptive report  If requested, specimen will be sent to reference lab for confirmation  FOR MEDICAL PURPOSES ONLY     IF CONFIRMATION NEEDED PLEASE CONTACT THE LAB WITHIN 5 DAYS      Drug Screen Cutoff Levels:  AMPHETAMINE/METHAMPHETAMINES  1000 ng/mL  BARBITURATES     200 ng/mL  BENZODIAZEPINES     200 ng/mL  COCAINE      300 ng/mL  METHADONE      300 ng/mL  OPIATES      300 ng/mL  PHENCYCLIDINE     25 ng/mL  THC       50 ng/mL    CBC and differential [[id number]]  (Abnormal) Collected:  12/30/18 1107    Lab Status:  Final result Specimen:  Blood from Arm, Right Updated:  12/30/18 1115     WBC 5 87 Thousand/uL      RBC 5 24 (H) Million/uL      Hemoglobin 16 8 (H) g/dL      Hematocrit 50 7 (H) %      MCV 97 fL      MCH 32 1 pg      MCHC 33 1 g/dL      RDW 12 3 %      MPV 9 9 fL      Platelets 893 Thousands/uL      nRBC 0 /100 WBCs      Neutrophils Relative 66 %      Immat GRANS % 0 %      Lymphocytes Relative 23 %      Monocytes Relative 9 %      Eosinophils Relative 1 %      Basophils Relative 1 %      Neutrophils Absolute 3 89 Thousands/µL      Immature Grans Absolute 0 01 Thousand/uL      Lymphocytes Absolute 1 35 Thousands/µL      Monocytes Absolute 0 52 Thousand/µL      Eosinophils Absolute 0 07 Thousand/µL      Basophils Absolute 0 03 Thousands/µL     POCT pregnancy, urine [[id number]]  (Normal) Resulted:  12/30/18 1112    Lab Status:  Final result Updated:  12/30/18 1112     EXT PREG TEST UR (Ref: Negative) Negative    UA w Reflex to Microscopic w Reflex to Culture [[id number]]  (Abnormal) Collected:  12/30/18 1101    Lab Status:  Final result Specimen:  Urine from Urine, Clean Catch Updated:  12/30/18 1110     Color, UA Yellow     Clarity, UA Slightly Cloudy     Specific Gravity, UA 1 015     pH, UA 7 0     Leukocytes, UA Negative     Nitrite, UA Negative     Protein, UA Negative mg/dl      Glucose, UA Negative mg/dl      Ketones, UA Negative mg/dl      Urobilinogen, UA 0 2 E U /dl      Bilirubin, UA Negative     Blood, UA Moderate (A)                 XR abdomen obstruction series    (Results Pending)              Procedures  ECG 12 Lead Documentation  Date/Time: 12/30/2018 10:55 AM  Performed by: Gordon Patiño  Authorized by: Gordon Patiño     Indications / Diagnosis:  Medical clearance  ECG reviewed by me, the ED Provider: yes    Patient location:  ED  Previous ECG:     Previous ECG:  Compared to current    Similarity:  No change  Interpretation:     Interpretation: abnormal    Comments:      Sinus rhythm, rate 91, normal axis, normal intervals, T-wave flattening noted to lead 3 and AVF, nonspecific T-wave abnormality, essentially unchanged from previous EKG  Phone Contacts  ED Phone Contact    ED Course  ED Course as of Dec 30 1702   Sun Dec 30, 2018   1344 Patient was seen by our crisis worker  At this point patient is not at any danger of hurting herself or others  Patient given outpatient resources for therapist and psychiatrist                                 MDM  Number of Diagnoses or Management Options  Abdominal pain: new and requires workup  Anxiety: new and requires workup  Chronic back pain: new and requires workup  Diarrhea: new and requires workup  Drug-seeking behavior: new and requires workup  Diagnosis management comments: Obtain blood work, abdominal obstruction series, UA, UDS, EKG to medically clear patient  Consult our crisis worker       Amount and/or Complexity of Data Reviewed  Clinical lab tests: ordered and reviewed  Tests in the radiology section of CPT®: ordered and reviewed  Tests in the medicine section of CPT®: ordered and reviewed  Independent visualization of images, tracings, or specimens: yes    Risk of Complications, Morbidity, and/or Mortality  General comments: Blood work is unremarkable  UDS positive for benzodiazepines  Patient currently states she is not taking any controlled substances  Patient became very angry when I told her that I am not giving her any narcotics for her back pain  Patient is presented with drug-seeking behavior    When reviewing patient's record, patient was seen at another hospital 3 days ago for heroin overdose  At this point patient's symptoms of mild abdominal pain and diarrhea is most likely secondary to enteritis most likely viral in origin  Patient hydrated in the ER with IV fluids  Patient has history of chronic low back pain that is not any worse compared to baseline  Patient does not exhibit any signs of cauda equina syndrome or cord compression  At this point discussed with patient the importance of following up with Orthopedic surgery for further management and possible imaging of the spine via MRI  I offered patient steroids and muscle relaxants for chronic back pain and was very clear that I will not prescribe any controlled substances due to her heroin overdose 3 days ago  At this point patient was agreeable to steroids and muscle relaxants with follow-up to PCP and Orthopedic surgery  Patient was seen by our crisis worker and was given resources for outpatient therapy and Psychiatry follow-up  At this point patient denies any suicidal or homicidal ideations  Patient is not a danger to herself or others  Close return instructions given to return to the ER for any worsening symptoms  Patient agrees with discharge plan  Patient well appearing at time of discharge        Patient Progress  Patient progress: stable    CritCare Time    Disposition  Final diagnoses:   Abdominal pain   Diarrhea   Chronic back pain   Anxiety   Drug-seeking behavior     Time reflects when diagnosis was documented in both MDM as applicable and the Disposition within this note     Time User Action Codes Description Comment    12/30/2018  2:41 PM Mary Cuban Add [R10 9] Abdominal pain     12/30/2018  2:41 PM Mary Cuban Add [R19 7] Diarrhea     12/30/2018  2:42 PM Mary Cuban Add [M54 9,  G89 29] Chronic back pain     12/30/2018  2:42 PM Mary Cuban Add [F41 9] Anxiety     12/30/2018  4:55 PM Rose Mtz Add [Z76 5] Drug-seeking behavior       ED Disposition     ED Disposition Condition Comment    Discharge  [address] discharge to home/self care  Condition at discharge: Good        Follow-up Information     Follow up With Specialties Details Why Contact Info    Nancy Mitchell MD Internal Medicine In 1 day  [address]  [address] [facility]  [phone]          Please follow up with the resources given to you in the ED for further evaluation and management of her anxiety  Discharge Medication List as of 12/30/2018  3:02 PM      START taking these medications    Details   cyclobenzaprine (FLEXERIL) 10 mg tablet Take 1 tablet (10 mg total) by mouth 2 (two) times a day as needed for muscle spasms, Starting Sun 12/30/2018, Normal      Methylprednisolone 4 MG TBPK Use as directed on package, Normal         CONTINUE these medications which have NOT CHANGED    Details   ALPRAZolam (XANAX) 1 mg tablet Take by mouth 2 (two) times a day as needed for anxiety, Historical Med      cloNIDine (CATAPRES) 0 1 mg tablet Take 0 2 mg by mouth every 12 (twelve) hours, Historical Med           No discharge procedures on file      ED Provider  Electronically Signed by           Maglay Mancia DO  12/30/18 3532

## 2018-12-31 LAB
ATRIAL RATE: 91 BPM
P AXIS: 71 DEGREES
PR INTERVAL: 114 MS
QRS AXIS: 33 DEGREES
QRSD INTERVAL: 84 MS
QT INTERVAL: 364 MS
QTC INTERVAL: 447 MS
T WAVE AXIS: 67 DEGREES
VENTRICULAR RATE: 91 BPM

## 2018-12-31 PROCEDURE — 93010 ELECTROCARDIOGRAM REPORT: CPT | Performed by: INTERNAL MEDICINE

## 2019-02-22 ENCOUNTER — OFFICE VISIT (OUTPATIENT)
Dept: URGENT CARE | Facility: CLINIC | Age: 49
End: 2019-02-22
Payer: COMMERCIAL

## 2019-02-22 ENCOUNTER — TELEPHONE (OUTPATIENT)
Dept: OBGYN CLINIC | Facility: HOSPITAL | Age: 49
End: 2019-02-22

## 2019-02-22 VITALS
HEART RATE: 126 BPM | HEIGHT: 69 IN | BODY MASS INDEX: 17.48 KG/M2 | DIASTOLIC BLOOD PRESSURE: 84 MMHG | RESPIRATION RATE: 18 BRPM | SYSTOLIC BLOOD PRESSURE: 126 MMHG | OXYGEN SATURATION: 98 % | WEIGHT: 118 LBS | TEMPERATURE: 98.6 F

## 2019-02-22 DIAGNOSIS — M54.50 MIDLINE LOW BACK PAIN WITHOUT SCIATICA, UNSPECIFIED CHRONICITY: Primary | ICD-10-CM

## 2019-02-22 PROCEDURE — 99213 OFFICE O/P EST LOW 20 MIN: CPT | Performed by: NURSE PRACTITIONER

## 2019-02-22 RX ORDER — BACLOFEN 10 MG/1
10 TABLET ORAL 3 TIMES DAILY
Qty: 15 TABLET | Refills: 0 | Status: SHIPPED | OUTPATIENT
Start: 2019-02-22 | End: 2019-02-22 | Stop reason: CLARIF

## 2019-02-22 RX ORDER — PREDNISONE 20 MG/1
20 TABLET ORAL DAILY
Qty: 5 TABLET | Refills: 0 | Status: SHIPPED | OUTPATIENT
Start: 2019-02-22 | End: 2019-02-27

## 2019-02-22 RX ORDER — BACLOFEN 10 MG/1
10 TABLET ORAL 3 TIMES DAILY
Qty: 15 TABLET | Refills: 0 | Status: SHIPPED | OUTPATIENT
Start: 2019-02-22 | End: 2020-02-25

## 2019-02-22 RX ORDER — PREDNISONE 20 MG/1
20 TABLET ORAL DAILY
Qty: 5 TABLET | Refills: 0 | Status: SHIPPED | OUTPATIENT
Start: 2019-02-22 | End: 2019-02-22 | Stop reason: CLARIF

## 2019-02-22 NOTE — TELEPHONE ENCOUNTER
Patient scheduled for an appt on Monday  I emailed her the new patient packet to her email [email or web address]  No prior pain management  [place] health insurance

## 2019-02-22 NOTE — PATIENT INSTRUCTIONS
Take prednisone in the morning  Take muscle relaxant only when staying at home, this medication can make you sleepy    Use gel as prescribed to lower back  Practice gentle Yoga     May use tylenol/ibuprofen as needed    If you become incontinent of bowel or bladder go directly to the ER      Follow up with you PCP

## 2019-02-22 NOTE — PROGRESS NOTES
[facility]        NAME: Candance Mitten is a 51 y o  female  : 1970    MRN: [id number]  DATE: 2019  TIME: 2:37 PM    Assessment and Plan   Midline low back pain without sciatica, unspecified chronicity [M54 5]  1  Midline low back pain without sciatica, unspecified chronicity  predniSONE 20 mg tablet    baclofen 10 mg tablet    diclofenac sodium (VOLTAREN) 1 %         Patient Instructions     Take prednisone in the morning  Take muscle relaxant only when staying at home, this medication can make you sleepy    Use gel as prescribed to lower back  Practice gentle Yoga     May use tylenol/ibuprofen as needed    If you become incontinent of bowel or bladder go directly to the ER  Follow up with you PCP      Chief Complaint     Chief Complaint   Patient presents with    Back Pain     low back pain intermittent in nature x2 times per week, on rare occasions it radiates to abd  pt has hx of hodgekins lymphoma, bowel resection         History of Present Illness       51year old female presents for intermittent low back pain  When the pain occurs it is throbbing in nature and there is no position of comfort  She has had this pain for several months  She has a history of a bone marrow biopsy in the area in which the pain is located  She had one episode of lower abdominal pain that she feels was associated with the back pain  She currently does not have any abdominal pain  She states she has had diarrhea associated with the back pain  When asked about her diet she did reveal that she had eaten chili for several meals, which could of contributed to the diarrhea  She has a history of back problems, that includes a compressed disc  She was seen in the ED the end of December and had an x-ray of her back that revealed no new abnormalities  She denies that the pain radiates down her legs  She has not been incontinent of bowel or bladder    She states that her PCP had prescribed her Tramadol and Flexeril in the past, and the tramadol was helpful  Review of Systems   Review of Systems   Gastrointestinal: Negative for abdominal pain, diarrhea, nausea and vomiting  Musculoskeletal: Positive for arthralgias and back pain  Psychiatric/Behavioral: The patient is nervous/anxious and is hyperactive  Current Medications       Current Outpatient Medications:     ALPRAZolam (XANAX) 1 mg tablet, Take by mouth 2 (two) times a day as needed for anxiety, Disp: , Rfl:     cloNIDine (CATAPRES) 0 1 mg tablet, Take 0 2 mg by mouth every 12 (twelve) hours, Disp: , Rfl:     baclofen 10 mg tablet, Take 1 tablet (10 mg total) by mouth 3 (three) times a day for 10 days, Disp: 15 tablet, Rfl: 0    diclofenac sodium (VOLTAREN) 1 %, Apply 2 g topically 4 (four) times a day for 5 days, Disp: 40 g, Rfl: 0    predniSONE 20 mg tablet, Take 1 tablet (20 mg total) by mouth daily for 5 days, Disp: 5 tablet, Rfl: 0    Current Allergies     Allergies as of 02/22/2019 - Reviewed 02/22/2019   Allergen Reaction Noted    Compazine [prochlorperazine]  12/21/2018            The following portions of the patient's history were reviewed and updated as appropriate: allergies, current medications, past family history, past medical history, past social history, past surgical history and problem list      Past Medical History:   Diagnosis Date    [facility] ([facility] [address] )     Hypertension     Psychiatric disorder     anxiety, depression       Past Surgical History:   Procedure Laterality Date    BOWEL RESECTION         History reviewed  No pertinent family history  Medications have been verified  Objective   /84   Pulse (!) 126   Temp 98 6 °F (37 °C)   Resp 18   Ht 5' 9" (1 753 m)   Wt 53 5 kg (118 lb)   LMP  (LMP Unknown)   SpO2 98%   BMI 17 43 kg/m²        Physical Exam     Physical Exam   Constitutional: She appears well-developed and well-nourished     Musculoskeletal:        Lumbar back: She exhibits tenderness  She exhibits normal range of motion, no bony tenderness and no deformity  There is no point tenderness when lumbar spine is palpated  Bilateral lower extremities are strong with flexion and extension against resistance  Lower extremities are warm to touch with good capillary refill and sensation  Patella reflexes are 2+  Upper extremities are warm to touch with strong, equal bilateral grasps  Psychiatric: Her speech is normal  Her mood appears anxious  Nursing note and vitals reviewed

## 2019-02-25 ENCOUNTER — TELEPHONE (OUTPATIENT)
Dept: PAIN MEDICINE | Facility: CLINIC | Age: 49
End: 2019-02-25

## 2019-02-25 ENCOUNTER — CONSULT (OUTPATIENT)
Dept: PAIN MEDICINE | Facility: CLINIC | Age: 49
End: 2019-02-25
Payer: COMMERCIAL

## 2019-02-25 VITALS
WEIGHT: 129 LBS | HEIGHT: 69 IN | HEART RATE: 96 BPM | DIASTOLIC BLOOD PRESSURE: 58 MMHG | BODY MASS INDEX: 19.11 KG/M2 | RESPIRATION RATE: 18 BRPM | SYSTOLIC BLOOD PRESSURE: 124 MMHG

## 2019-02-25 DIAGNOSIS — M54.50 CHRONIC LEFT-SIDED LOW BACK PAIN WITHOUT SCIATICA: ICD-10-CM

## 2019-02-25 DIAGNOSIS — G89.4 CHRONIC PAIN SYNDROME: Primary | ICD-10-CM

## 2019-02-25 DIAGNOSIS — G89.29 CHRONIC LEFT-SIDED LOW BACK PAIN WITHOUT SCIATICA: ICD-10-CM

## 2019-02-25 DIAGNOSIS — M51.36 LUMBAR DEGENERATIVE DISC DISEASE: ICD-10-CM

## 2019-02-25 PROCEDURE — 99204 OFFICE O/P NEW MOD 45 MIN: CPT | Performed by: ANESTHESIOLOGY

## 2019-02-25 NOTE — PROGRESS NOTES
Assessment:  1  Chronic pain syndrome    2  Chronic left-sided low back pain without sciatica    3  Lumbar degenerative disc disease        Plan:  Orders Placed This Encounter   Procedures    Ambulatory referral to Physical Therapy     Standing Status:   Future     Standing Expiration Date:   8/25/2019     Referral Priority:   Routine     Referral Type:   Physical Therapy     Referral Reason:   Specialty Services Required     Requested Specialty:   Physical Therapy     Number of Visits Requested:   1     Expiration Date:   2/25/2020     My impressions and treatment recommendations were discussed in detail with the patient, who verbalized understanding and had no further questions  The patient reports a 1 year history of left-sided low back pain that is nonradiating in nature  She has not yet undergone physical therapy for her pain, so I felt a reasonable to have her undergo physical therapy 2-3 times per week for 4-6 weeks  If she does not respond to physical therapy, I will obtain a MRI of the lumbar spine  There lumbar degenerative disc disease seen on a CT of the chest, abdomen, and pelvis performed in December 2018, so I feel as though proceeding with the MRI of the lumbar spine if she fails physical therapy is the next step  In addition, the patient is complaining of nausea and diarrhea  She does give a history of Crohn's disease, so I suggested that she get evaluated with the physician that manages her Crohn's disease currently  The patient verbalized understanding  Follow-up is planned in 6 weeks time or sooner as warranted  Discharge instructions were provided  I personally saw and examined the patient and I agree with the above discussed plan of care  History of Present Illness: Zohreh Veag is a 51 y o  female who presents to [facility] for initial evaluation of the above stated pain complaints   The patient has a past medical and chronic pain history as outlined in the assessment section  She was referred by Dr Cecily Issa  At today's office visit, the patient complains of 1 year of left-sided low back pain  She states that her pain is moderate to severe and 8/10 on the verbal numerical pain rating scale  The pain is intermittent in nature without any typical pattern  She describes her pain as cutting, and throbbing    She reports that lying down decreases pain while bending and walking increases pain  She reports that she had a CT of the chest, abdomen, and pelvis in December 2018  She is currently using tramadol as well as baclofen as needed for pain relief  She is currently employed as a caretaker  Review of Systems:    Review of Systems   Constitutional: Positive for unexpected weight change  Negative for fever  HENT: Negative for trouble swallowing  Eyes: Negative for visual disturbance  Respiratory: Negative for shortness of breath and wheezing  Cardiovascular: Positive for palpitations  Negative for chest pain  Gastrointestinal: Positive for diarrhea and nausea  Negative for constipation and vomiting  Endocrine: Negative for cold intolerance, heat intolerance and polydipsia  Genitourinary: Negative for difficulty urinating and frequency  Musculoskeletal: Negative for arthralgias, gait problem, joint swelling and myalgias  Skin: Negative for rash  Neurological: Positive for dizziness  Negative for seizures, syncope, weakness and headaches  Hematological: Does not bruise/bleed easily  Psychiatric/Behavioral: Negative for dysphoric mood  All other systems reviewed and are negative  There is no problem list on file for this patient  Past Medical History:   Diagnosis Date    Hodgkin lymphoma ([facility] [address] )     Hypertension     Psychiatric disorder     anxiety, depression       Past Surgical History:   Procedure Laterality Date    BOWEL RESECTION         History reviewed  No pertinent family history      Social History     Occupational History    Not on file   Tobacco Use    Smoking status: Current Every Day Smoker     Packs/day: 0 20    Smokeless tobacco: Never Used   Substance and Sexual Activity    Alcohol use: Yes     Comment: occassional    Drug use: No    Sexual activity: Not on file         Current Outpatient Medications:     ALPRAZolam (XANAX) 1 mg tablet, Take by mouth 2 (two) times a day as needed for anxiety, Disp: , Rfl:     baclofen 10 mg tablet, Take 1 tablet (10 mg total) by mouth 3 (three) times a day for 5 days, Disp: 15 tablet, Rfl: 0    cloNIDine (CATAPRES) 0 1 mg tablet, Take 0 2 mg by mouth every 12 (twelve) hours, Disp: , Rfl:     diclofenac sodium (VOLTAREN) 1 %, Apply 2 g topically 4 (four) times a day for 5 days, Disp: 40 g, Rfl: 0    predniSONE 20 mg tablet, Take 1 tablet (20 mg total) by mouth daily for 5 days, Disp: 5 tablet, Rfl: 0    Allergies   Allergen Reactions    Compazine [Prochlorperazine]        Physical Exam:    /58 (BP Location: Left arm, Patient Position: Sitting, Cuff Size: Standard)   Pulse 96   Resp 18   Ht 5' 9" (1 753 m)   Wt 58 5 kg (129 lb)   LMP  (LMP Unknown)   BMI 19 05 kg/m²     Constitutional: normal, well developed, well nourished, alert, in no distress and non-toxic and no overt pain behavior    Eyes: anicteric  HEENT: grossly intact  Neck: supple, symmetric, trachea midline and no masses   Pulmonary:even and unlabored  Cardiovascular:No edema or pitting edema present  Skin:Normal without rashes or lesions and well hydrated  Psychiatric:Mood and affect appropriate  Neurologic:Cranial Nerves II-XII grossly intact  Musculoskeletal:normal    Lumbar Spine Exam    Appearance:  Normal lordosis  Palpation/Tenderness:  no tenderness or spasm  Sensory:  no sensory deficits noted  Range of Motion:  Flexion:  No limitation  without pain  Extension:  No limitation  without pain  Lateral Flexion - Left:  No limitation  without pain  Lateral Flexion - Right: No limitation  without pain  Rotation - Left:  No limitation  without pain  Rotation - Right:  No limitation  without pain   Lumbar facet loading is mildly positive on the left and negative on the right  Motor Strength:  Left hip flexion:  5/5  Left hip extension:  5/5  Right hip flexion:  5/5  Right hip extension:  5/5  Left knee flexion:  5/5  Left knee extension:  5/5  Right knee flexion:  5/5  Right knee extension:  5/5  Left foot dorsiflexion:  5/5  Left foot plantar flexion:  5/5  Right foot dorsiflexion:  5/5  Right foot plantar flexion:  5/5  Reflexes:  Left Patellar:  2+   Right Patellar:  2+   Left Achilles:  2+   Right Achilles:  2+   Special Tests:  Left Straight Leg Test:  negative  Right Straight Leg Test:  negative  Left Angel's Maneuver:  negative  Right Angel's Maneuver:  negative    Imaging  No orders to display      Show images for XR abdomen obstruction series   Study Result     OBSTRUCTION SERIES     INDICATION:   diarrhea      COMPARISON:  None     EXAM PERFORMED/VIEWS:  XR ABDOMEN OBSTRUCTION SERIES  The frontal view was performed utilizing dual energy radiographic technique         FINDINGS:     Gas fluid levels in minimally distended small bowel and nondilated colon  Gas is present in the distal colon       No free air beneath the hemidiaphragms       No pathologic calcifications or soft tissue masses evident       Chronic bilateral pars defect at L5  Mild dextroconvex lumbar scoliosis      Examination of the chest reveals a normal cardiac silhouette  Aortic calcification is present  No airspace consolidation, pneumothorax, pulmonary edema, or pleural effusion    Right paratracheal calcifications attributed to old granulomatous disease      IMPRESSION:     Findings suggestive of gastroenteritis and diarrhea in the appropriate clinical context      Otherwise, no radiographic evidence of acute intra-abdominal or intrathoracic process         Workstation performed: [id number]      Imaging XR abdomen obstruction series (Order: [id number]) - 12/30/2018   Result History     XR abdomen obstruction series (Order #[id number]) on 12/31/2018 - Order Result History Report         Orders Placed This Encounter   Procedures    Ambulatory referral to Physical Therapy

## 2019-02-25 NOTE — LETTER
February 25, 2019     Brown Mares MD  [address] [address]    Patient: Vandana Varela   YOB: 1970   Date of Visit: 2/25/2019       Dear Dr Elizabeth Leisure: Thank you for referring More Collier to me for evaluation  Below are my notes for this consultation  If you have questions, please do not hesitate to call me  I look forward to following your patient along with you  Sincerely,        Jalen Bynum MD        CC: No Recipients  Jalen Bynum MD  2/25/2019  8:08 AM  Sign at close encounter  Assessment:  1  Chronic pain syndrome    2  Chronic left-sided low back pain without sciatica    3  Lumbar degenerative disc disease        Plan:  Orders Placed This Encounter   Procedures    Ambulatory referral to Physical Therapy     Standing Status:   Future     Standing Expiration Date:   8/25/2019     Referral Priority:   Routine     Referral Type:   Physical Therapy     Referral Reason:   Specialty Services Required     Requested Specialty:   Physical Therapy     Number of Visits Requested:   1     Expiration Date:   2/25/2020     My impressions and treatment recommendations were discussed in detail with the patient, who verbalized understanding and had no further questions  The patient reports a 1 year history of left-sided low back pain that is nonradiating in nature  She has not yet undergone physical therapy for her pain, so I felt a reasonable to have her undergo physical therapy 2-3 times per week for 4-6 weeks  If she does not respond to physical therapy, I will obtain a MRI of the lumbar spine  There lumbar degenerative disc disease seen on a CT of the chest, abdomen, and pelvis performed in December 2018, so I feel as though proceeding with the MRI of the lumbar spine if she fails physical therapy is the next step  In addition, the patient is complaining of nausea and diarrhea    She does give a history of Crohn's disease, so I suggested that she get evaluated with the physician that manages her Crohn's disease currently  The patient verbalized understanding  Follow-up is planned in 6 weeks time or sooner as warranted  Discharge instructions were provided  I personally saw and examined the patient and I agree with the above discussed plan of care  History of Present Illness: Salvador Lombardi is a 51 y o  female who presents to [facility] for initial evaluation of the above stated pain complaints  The patient has a past medical and chronic pain history as outlined in the assessment section  She was referred by Dr Jerome Pappas  At today's office visit, the patient complains of 1 year of left-sided low back pain  She states that her pain is moderate to severe and 8/10 on the verbal numerical pain rating scale  The pain is intermittent in nature without any typical pattern  She describes her pain as cutting, and throbbing    She reports that lying down decreases pain while bending and walking increases pain  She reports that she had a CT of the chest, abdomen, and pelvis in December 2018  She is currently using tramadol as well as baclofen as needed for pain relief  She is currently employed as a caretaker  Review of Systems:    Review of Systems   Constitutional: Positive for unexpected weight change  Negative for fever  HENT: Negative for trouble swallowing  Eyes: Negative for visual disturbance  Respiratory: Negative for shortness of breath and wheezing  Cardiovascular: Positive for palpitations  Negative for chest pain  Gastrointestinal: Positive for diarrhea and nausea  Negative for constipation and vomiting  Endocrine: Negative for cold intolerance, heat intolerance and polydipsia  Genitourinary: Negative for difficulty urinating and frequency  Musculoskeletal: Negative for arthralgias, gait problem, joint swelling and myalgias  Skin: Negative for rash     Neurological: Positive for dizziness  Negative for seizures, syncope, weakness and headaches  Hematological: Does not bruise/bleed easily  Psychiatric/Behavioral: Negative for dysphoric mood  All other systems reviewed and are negative  There is no problem list on file for this patient  Past Medical History:   Diagnosis Date    Hodgkin lymphoma ([facility] [address] )     Hypertension     Psychiatric disorder     anxiety, depression       Past Surgical History:   Procedure Laterality Date    BOWEL RESECTION         History reviewed  No pertinent family history  Social History     Occupational History    Not on file   Tobacco Use    Smoking status: Current Every Day Smoker     Packs/day: 0 20    Smokeless tobacco: Never Used   Substance and Sexual Activity    Alcohol use: Yes     Comment: occassional    Drug use: No    Sexual activity: Not on file         Current Outpatient Medications:     ALPRAZolam (XANAX) 1 mg tablet, Take by mouth 2 (two) times a day as needed for anxiety, Disp: , Rfl:     baclofen 10 mg tablet, Take 1 tablet (10 mg total) by mouth 3 (three) times a day for 5 days, Disp: 15 tablet, Rfl: 0    cloNIDine (CATAPRES) 0 1 mg tablet, Take 0 2 mg by mouth every 12 (twelve) hours, Disp: , Rfl:     diclofenac sodium (VOLTAREN) 1 %, Apply 2 g topically 4 (four) times a day for 5 days, Disp: 40 g, Rfl: 0    predniSONE 20 mg tablet, Take 1 tablet (20 mg total) by mouth daily for 5 days, Disp: 5 tablet, Rfl: 0    Allergies   Allergen Reactions    Compazine [Prochlorperazine]        Physical Exam:    /58 (BP Location: Left arm, Patient Position: Sitting, Cuff Size: Standard)   Pulse 96   Resp 18   Ht 5' 9" (1 753 m)   Wt 58 5 kg (129 lb)   LMP  (LMP Unknown)   BMI 19 05 kg/m²      Constitutional: normal, well developed, well nourished, alert, in no distress and non-toxic and no overt pain behavior    Eyes: anicteric  HEENT: grossly intact  Neck: supple, symmetric, trachea midline and no masses Pulmonary:even and unlabored  Cardiovascular:No edema or pitting edema present  Skin:Normal without rashes or lesions and well hydrated  Psychiatric:Mood and affect appropriate  Neurologic:Cranial Nerves II-XII grossly intact  Musculoskeletal:normal    Lumbar Spine Exam    Appearance:  Normal lordosis  Palpation/Tenderness:  no tenderness or spasm  Sensory:  no sensory deficits noted  Range of Motion:  Flexion:  No limitation  without pain  Extension:  No limitation  without pain  Lateral Flexion - Left:  No limitation  without pain  Lateral Flexion - Right:  No limitation  without pain  Rotation - Left:  No limitation  without pain  Rotation - Right:  No limitation  without pain   Lumbar facet loading is mildly positive on the left and negative on the right  Motor Strength:  Left hip flexion:  5/5  Left hip extension:  5/5  Right hip flexion:  5/5  Right hip extension:  5/5  Left knee flexion:  5/5  Left knee extension:  5/5  Right knee flexion:  5/5  Right knee extension:  5/5  Left foot dorsiflexion:  5/5  Left foot plantar flexion:  5/5  Right foot dorsiflexion:  5/5  Right foot plantar flexion:  5/5  Reflexes:  Left Patellar:  2+   Right Patellar:  2+   Left Achilles:  2+   Right Achilles:  2+   Special Tests:  Left Straight Leg Test:  negative  Right Straight Leg Test:  negative  Left Angel's Maneuver:  negative  Right Angel's Maneuver:  negative    Imaging  No orders to display      Show images for XR abdomen obstruction series   Study Result     OBSTRUCTION SERIES     INDICATION:   diarrhea      COMPARISON:  None     EXAM PERFORMED/VIEWS:  XR ABDOMEN OBSTRUCTION SERIES  The frontal view was performed utilizing dual energy radiographic technique         FINDINGS:     Gas fluid levels in minimally distended small bowel and nondilated colon    Gas is present in the distal colon       No free air beneath the hemidiaphragms       No pathologic calcifications or soft tissue masses evident       Chronic bilateral pars defect at L5  Mild dextroconvex lumbar scoliosis      Examination of the chest reveals a normal cardiac silhouette  Aortic calcification is present  No airspace consolidation, pneumothorax, pulmonary edema, or pleural effusion    Right paratracheal calcifications attributed to old granulomatous disease      IMPRESSION:     Findings suggestive of gastroenteritis and diarrhea in the appropriate clinical context      Otherwise, no radiographic evidence of acute intra-abdominal or intrathoracic process         Workstation performed: [id number]      Imaging     XR abdomen obstruction series (Order: [id number]) - 12/30/2018   Result History     XR abdomen obstruction series (Order #[id number]) on 12/31/2018 - Order Result History Report         Orders Placed This Encounter   Procedures    Ambulatory referral to Physical Therapy

## 2019-04-09 ENCOUNTER — HOSPITAL ENCOUNTER (EMERGENCY)
Facility: HOSPITAL | Age: 49
Discharge: HOME | End: 2019-04-09
Attending: EMERGENCY MEDICINE
Payer: COMMERCIAL

## 2019-04-09 VITALS
WEIGHT: 128.97 LBS | OXYGEN SATURATION: 98 % | BODY MASS INDEX: 19.1 KG/M2 | HEIGHT: 69 IN | RESPIRATION RATE: 20 BRPM | SYSTOLIC BLOOD PRESSURE: 149 MMHG | HEART RATE: 120 BPM | TEMPERATURE: 97.1 F | DIASTOLIC BLOOD PRESSURE: 80 MMHG

## 2019-04-09 DIAGNOSIS — T50.901A ACCIDENTAL OVERDOSE: Primary | ICD-10-CM

## 2019-04-09 PROCEDURE — 99284 EMERGENCY DEPT VISIT MOD MDM: CPT

## 2019-10-25 LAB — HBA1C MFR BLD HPLC: 5.6 %

## 2019-10-28 ENCOUNTER — HOSPITAL ENCOUNTER (EMERGENCY)
Facility: HOSPITAL | Age: 49
Discharge: HOME | End: 2019-10-28
Attending: EMERGENCY MEDICINE
Payer: COMMERCIAL

## 2019-10-28 VITALS
TEMPERATURE: 98.3 F | SYSTOLIC BLOOD PRESSURE: 146 MMHG | OXYGEN SATURATION: 99 % | HEART RATE: 115 BPM | BODY MASS INDEX: 18.16 KG/M2 | RESPIRATION RATE: 20 BRPM | WEIGHT: 123 LBS | DIASTOLIC BLOOD PRESSURE: 73 MMHG

## 2019-10-28 DIAGNOSIS — N39.0 UTI (URINARY TRACT INFECTION): ICD-10-CM

## 2019-10-28 DIAGNOSIS — R59.0 INGUINAL ADENOPATHY: ICD-10-CM

## 2019-10-28 DIAGNOSIS — R21 RASH AND NONSPECIFIC SKIN ERUPTION: Primary | ICD-10-CM

## 2019-10-28 LAB
BACTERIA UR QL AUTO: ABNORMAL /HPF
BILIRUB UR QL STRIP: NEGATIVE
CLARITY UR: CLEAR
COLOR UR: YELLOW
GLUCOSE UR STRIP-MCNC: NEGATIVE MG/DL
HGB UR QL STRIP.AUTO: ABNORMAL
KETONES UR STRIP-MCNC: NEGATIVE MG/DL
LEUKOCYTE ESTERASE UR QL STRIP: ABNORMAL
NITRITE UR QL STRIP: NEGATIVE
NON-SQ EPI CELLS URNS QL MICRO: ABNORMAL /HPF
OTHER STN SPEC: ABNORMAL
PH UR STRIP.AUTO: 6.5 [PH]
PROT UR STRIP-MCNC: NEGATIVE MG/DL
RBC #/AREA URNS AUTO: ABNORMAL /HPF
SP GR UR STRIP.AUTO: 1.01 (ref 1–1.03)
UROBILINOGEN UR QL STRIP.AUTO: 0.2 E.U./DL
WBC #/AREA URNS AUTO: ABNORMAL /HPF

## 2019-10-28 PROCEDURE — 81001 URINALYSIS AUTO W/SCOPE: CPT | Performed by: PHYSICIAN ASSISTANT

## 2019-10-28 PROCEDURE — 87491 CHLMYD TRACH DNA AMP PROBE: CPT | Performed by: PHYSICIAN ASSISTANT

## 2019-10-28 PROCEDURE — 87591 N.GONORRHOEAE DNA AMP PROB: CPT | Performed by: PHYSICIAN ASSISTANT

## 2019-10-28 PROCEDURE — 99283 EMERGENCY DEPT VISIT LOW MDM: CPT

## 2019-10-28 RX ORDER — CEPHALEXIN 500 MG/1
500 CAPSULE ORAL 2 TIMES DAILY
Qty: 14 CAPSULE | Refills: 0 | Status: SHIPPED | OUTPATIENT
Start: 2019-10-28 | End: 2019-11-04

## 2019-10-28 RX ORDER — HYDROXYZINE HYDROCHLORIDE 25 MG/1
25 TABLET, FILM COATED ORAL EVERY 6 HOURS PRN
Qty: 30 TABLET | Refills: 0 | Status: SHIPPED | OUTPATIENT
Start: 2019-10-28 | End: 2020-02-25

## 2019-10-28 RX ORDER — PREDNISONE 20 MG/1
40 TABLET ORAL DAILY
Qty: 10 TABLET | Refills: 0 | Status: SHIPPED | OUTPATIENT
Start: 2019-10-28 | End: 2019-11-02

## 2019-10-28 NOTE — ED PROVIDER NOTES
History  Chief Complaint   Patient presents with    Rash     c/o rash that started in her ears about a month ago, now in varied spots, elbow groin,  and c/o recentl weight loss     27-year-old female, history of Hodgkin's lymphoma/HTN, presenting today with a rash over the past 2 weeks that is very pruritic noted on the bilateral ear canals as well as the bilateral elbows and left lower abdomen  States that the more she scratches more she itches  Has been using hydrocortisone cream over the past 3 weeks with some temporary relief  Notes excoriation to these regions  States that she has also urinating more frequently and has noticed a swollen right groin lymph node  Patient relays that she does have a history of Hodgkin's lymphoma currently in remission  Just had lab work drawn by Dr Mariann Anderson and performed at [facility] and is awaiting the results  States that she has not had any weight loss however has had issues gaining weight  States that she stop taking her Synthroid medication as she did not like how that this was making her feel  Patient is adamant that she is not sexually active  Denies nausea vomiting, chest pain, shortness of breath, night sweats, cough, congestion, sore throat, vaginal discharge or bleeding  Prior to Admission Medications   Prescriptions Last Dose Informant Patient Reported? Taking?    ALPRAZolam (XANAX) 1 mg tablet 10/28/2019 at Unknown time  Yes Yes   Sig: Take by mouth 2 (two) times a day as needed for anxiety   baclofen 10 mg tablet   No No   Sig: Take 1 tablet (10 mg total) by mouth 3 (three) times a day for 5 days   cloNIDine (CATAPRES) 0 1 mg tablet Past Week at Unknown time  Yes Yes   Sig: Take 0 2 mg by mouth as needed    diclofenac sodium (VOLTAREN) 1 %   No No   Sig: Apply 2 g topically 4 (four) times a day for 5 days      Facility-Administered Medications: None       Past Medical History:   Diagnosis Date    Hodgkin lymphoma ([facility] [address] )     Hypertension     Psychiatric disorder     anxiety, depression       Past Surgical History:   Procedure Laterality Date    BOWEL RESECTION         History reviewed  No pertinent family history  I have reviewed and agree with the history as documented  Social History     Tobacco Use    Smoking status: Current Every Day Smoker    Smokeless tobacco: Never Used    Tobacco comment: 2 cig a day   Substance Use Topics    Alcohol use: Yes     Comment: occassional    Drug use: Not Currently        Review of Systems   Constitutional: Negative  HENT: Negative  Eyes: Negative  Respiratory: Negative  Cardiovascular: Negative  Gastrointestinal: Negative  Genitourinary: Negative  Musculoskeletal: Negative  Skin: Positive for rash  Negative for color change, pallor and wound  Neurological: Negative  All other systems reviewed and are negative  Physical Exam  Physical Exam   Constitutional: She is oriented to person, place, and time  She appears well-developed and well-nourished  HENT:   Head: Normocephalic and atraumatic  Right Ear: External ear normal    Left Ear: External ear normal    Nose: Nose normal    Mouth/Throat: Oropharynx is clear and moist    Eyes: Pupils are equal, round, and reactive to light  Conjunctivae are normal    Neck: Normal range of motion  Neck supple  Cardiovascular: Normal rate, regular rhythm, normal heart sounds and intact distal pulses  Exam reveals no gallop and no friction rub  No murmur heard  Pulmonary/Chest: Effort normal and breath sounds normal  No stridor  No respiratory distress  She has no wheezes  She has no rales  She exhibits no tenderness  S PO2 is 99% indicating adequate oxygenation   Abdominal: Soft  Bowel sounds are normal  She exhibits no distension and no mass  There is no tenderness  There is no rebound and no guarding  No hernia  Neurological: She is alert and oriented to person, place, and time  Skin: Skin is warm and dry   Capillary refill takes less than 2 seconds  Nursing note and vitals reviewed  Vital Signs  ED Triage Vitals [10/28/19 1408]   Temperature Pulse Respirations Blood Pressure SpO2   98 3 °F (36 8 °C) (!) 115 20 146/73 99 %      Temp Source Heart Rate Source Patient Position - Orthostatic VS BP Location FiO2 (%)   Tympanic Monitor Sitting Right arm --      Pain Score       No Pain           Vitals:    10/28/19 1408   BP: 146/73   Pulse: (!) 115   Patient Position - Orthostatic VS: Sitting         Visual Acuity      ED Medications  Medications - No data to display    Diagnostic Studies  Results Reviewed     Procedure Component Value Units Date/Time    Urine Microscopic [[id number]]  (Abnormal) Collected:  10/28/19 1618    Lab Status:  Final result Specimen:  Urine, Other Updated:  10/28/19 1640     RBC, UA 4-10 /hpf      WBC, UA 2-4 /hpf      Epithelial Cells Occasional /hpf      Bacteria, UA Occasional /hpf      OTHER OBSERVATIONS Renal Epithelial Cells Present    UA w Reflex to Microscopic w Reflex to Culture [[id number]]  (Abnormal) Collected:  10/28/19 1618    Lab Status:  Final result Specimen:  Urine, Other Updated:  10/28/19 1630     Color, UA Yellow     Clarity, UA Clear     Specific Gravity, UA 1 010     pH, UA 6 5     Leukocytes, UA Trace     Nitrite, UA Negative     Protein, UA Negative mg/dl      Glucose, UA Negative mg/dl      Ketones, UA Negative mg/dl      Urobilinogen, UA 0 2 E U /dl      Bilirubin, UA Negative     Blood, UA Moderate    Chlamydia/GC amplified DNA by PCR [[id number]] Collected:  10/28/19 1618    Lab Status:   In process Specimen:  Urine, Other Updated:  10/28/19 1627                 No orders to display              Procedures  Procedures       ED Course                               MDM  Number of Diagnoses or Management Options  Inguinal adenopathy:   Rash and nonspecific skin eruption:   UTI (urinary tract infection):   Diagnosis management comments: Rash appears to be more eczema in nature given dry scaling appearance  There is some skin thinning in the regions where she is placing hydrocortisone  Will have her discontinue this and start on some oral steroids  There is large amounts of excoriation however no secondary bacterial cellulitis noted  Right inguinal lymph node noted that is nontender and well mobile  Patient was recently seen by her family doctor and had lab work drawn  We are unable sees results today  Discussed with patient that we can either repeat these lab tests or have her wait to f/u with her PCP  Patient opted to wait  Otherwise patient appears well and nontoxic appearing, will treat the rash with oral steroids as well as hydroxyzine, informed not to drive or operate heavy machinery while taking this medication  Will also treat for UTI  Patient is informed to return to the emergency department for worsening of symptoms and was given proper education regarding their diagnosis and symptoms  Otherwise the patient is informed to follow up with their primary care doctor for re-evaluation  The patient verbalizes understanding and agrees with above assessment and plan  All questions were answered  Please Note: Fluency Direct voice recognition software may have been used in the creation of this document  Wrong words or sound a like substitutions may have occurred due to the inherent limitations of the voice software             Amount and/or Complexity of Data Reviewed  Clinical lab tests: ordered and reviewed  Review and summarize past medical records: yes  Independent visualization of images, tracings, or specimens: yes        Disposition  Final diagnoses:   Rash and nonspecific skin eruption   UTI (urinary tract infection)   Inguinal adenopathy     Time reflects when diagnosis was documented in both MDM as applicable and the Disposition within this note     Time User Action Codes Description Comment    10/28/2019  4:51 PM [facility] Add [R21] Rash and nonspecific skin eruption     10/28/2019  4:51 PM Ross Mao Add [N39 0] UTI (urinary tract infection)     10/28/2019  4:52 PM Ross Berny Add [R59 0] Inguinal adenopathy       ED Disposition     ED Disposition Condition Date/Time Comment    Discharge Stable Mon Oct 28, 2019  4:51 PM [facility] [address] discharge to home/self care  Follow-up Information     Follow up With Specialties Details Why Contact Info Additional [address] Emergency Department Emergency Medicine Go to  If symptoms worsen such as fevers, chest pain etc [address] [facility]  [phone] [facility], [place], [place], [address]    Tawny Jara MD Internal Medicine Schedule an appointment as soon as possible for a visit in 3 days  [address]  [address] [address]             Patient's Medications   Discharge Prescriptions    CEPHALEXIN (KEFLEX) 500 MG CAPSULE    Take 1 capsule (500 mg total) by mouth 2 (two) times a day for 7 days       Start Date: 10/28/2019End Date: 11/4/2019       Order Dose: 500 mg       Quantity: 14 capsule    Refills: 0    HYDROXYZINE HCL (ATARAX) 25 MG TABLET    Take 1 tablet (25 mg total) by mouth every 6 (six) hours as needed for itching       Start Date: 10/28/2019End Date: --       Order Dose: 25 mg       Quantity: 30 tablet    Refills: 0    PREDNISONE 20 MG TABLET    Take 2 tablets (40 mg total) by mouth daily for 5 days       Start Date: 10/28/2019End Date: 11/2/2019       Order Dose: 40 mg       Quantity: 10 tablet    Refills: 0     No discharge procedures on file      ED Provider  Electronically Signed by           Judy Dean PA-C  10/28/19 0078

## 2019-10-29 LAB
C TRACH DNA SPEC QL NAA+PROBE: NEGATIVE
N GONORRHOEA DNA SPEC QL NAA+PROBE: NEGATIVE

## 2019-11-04 ENCOUNTER — TELEPHONE (OUTPATIENT)
Dept: SURGICAL ONCOLOGY | Facility: CLINIC | Age: 49
End: 2019-11-04

## 2019-11-06 ENCOUNTER — TELEPHONE (OUTPATIENT)
Dept: SURGICAL ONCOLOGY | Facility: CLINIC | Age: 49
End: 2019-11-06

## 2019-11-06 NOTE — TELEPHONE ENCOUNTER
New Patient Encounter    New Patient Intake Form   Patient Details: Max Eason  1970  [id number]    Background Information:  [address] starts by opening a telephone encounter and gathering the following information   Who is calling to schedule? If not self, relationship to patient? Self   Referring Provider Self   What is the diagnosis? Hx of Hosgkin lymphoma 25 yrs ago   When was the diagnosis? 25 yrs   Is patient aware of diagnosis? Yes   Reason for visit? History Of   Have you had any testing done? If so: when, where? Yes   Are records in [employer]? yes   Was the patient told to bring a disk? no   Scheduling Information:   Preferred [place]:  Any     Requesting Specific Provider? Jayshree Andrew    Are there any dates/time the patient cannot be seen? no   Counseling Pre-Screen:  If the patient answers YES to any of the below questions, please route to the appropriate location specific counselor    Have you felt anxious or worried about cancer and the treatment you are receiving? No   Has your diagnosis caused physical, emotional, or financial hardship for you? No   Note: Do not ask the patient about transportation issues/needs  Please notate if the patient brings it up and the counselor will schedule accordingly  Miscellaneous: na    After completing the above information, please route to Financial Counselor and the appropriate Nurse Navigator for review

## 2019-11-13 ENCOUNTER — OFFICE VISIT (OUTPATIENT)
Dept: URGENT CARE | Facility: CLINIC | Age: 49
End: 2019-11-13
Payer: COMMERCIAL

## 2019-11-13 VITALS
TEMPERATURE: 97.3 F | HEART RATE: 107 BPM | SYSTOLIC BLOOD PRESSURE: 123 MMHG | DIASTOLIC BLOOD PRESSURE: 60 MMHG | WEIGHT: 125 LBS | OXYGEN SATURATION: 100 % | HEIGHT: 68 IN | BODY MASS INDEX: 18.94 KG/M2 | RESPIRATION RATE: 18 BRPM

## 2019-11-13 DIAGNOSIS — L20.89 OTHER ATOPIC DERMATITIS: Primary | ICD-10-CM

## 2019-11-13 PROCEDURE — 99213 OFFICE O/P EST LOW 20 MIN: CPT | Performed by: NURSE PRACTITIONER

## 2019-11-13 RX ORDER — ALPRAZOLAM 0.5 MG/1
0.5 TABLET ORAL 2 TIMES DAILY
Refills: 0 | COMMUNITY
Start: 2019-09-03 | End: 2022-08-04 | Stop reason: SDUPTHER

## 2019-11-13 NOTE — PATIENT INSTRUCTIONS
Call Dermatology to follow up     Dr Leda Figueroa  [phone]     Dr Brittany Vazquez [phone]    Apply thick lotion, non perfumed  Take warm baths/showers  Can use oatmeal bath for comfort      Eczema   AMBULATORY CARE:   Eczema , or atopic dermatitis, is an itchy, red skin rash  You are more likely to have it if your parent or a family member has eczema, asthma, or hay fever  It is a long-term condition that may cause flare-ups for the rest of your life  Common symptoms include the following:   · Patches of dry, red, itchy skin    · Bumps or blisters that crust over or ooze clear fluid    · Areas of skin that are thick, scaly, or hard and leather-like  Seek immediate care for the following symptoms:   · A fever or red streaks going up your arm or leg    · Increased swelling, redness, or warmth on your rash  Contact your healthcare provider if:   · Most of your skin is red, swollen, painful, and covered with scales  · You develop bloody, red, painful crusts  · Your skin blisters and oozes white or yellow pus  · You have questions about your condition or care  Treatment for eczema  is aimed at reducing pain and itching, and adding moisture to your skin  Your symptoms should improve after 3 weeks of treatment  There is no cure for eczema  You may need the following:  · Medicines , such as immunosuppressants, help reduce itching, redness, pain, and swelling  They may be given as a cream or pill  You may also receive antihistamines to reduce itching, or antibiotics if you have a skin infection  · Phototherapy , or ultraviolet light, may help heal your skin  It is also called light therapy  Manage eczema:   · Do not scratch  Pat or press on your skin to relieve itching  Your symptoms will get worse if you scratch  Keep your fingernails short so you do not tear your skin if you do scratch      · Keep your skin moist   Rub lotion, cream, or ointment into your skin right after a bath or shower when your skin is still damp  Ask your healthcare provider what to use and how often to use it  · Take baths or showers  with warm water for 10 minutes or less  Use mild bar soap  Ask your healthcare provider for the best soap for you to use  · Wear cotton clothes  Wear loose-fitting clothes made from cotton or cotton blends  Avoid wool  · Use a humidifier  to add moisture to the air in your home  · Avoid changes in temperature , especially activities that cause you to sweat a lot because this can cause itching  Remove blankets from your bed if you get hot while you sleep  · Avoid allergens, dust, and skin irritants  Do not let pets inside your home  Do not use perfume, fabric softener, or makeup that burns or itches  Follow up with your healthcare provider as directed:  Write down your questions so you remember to ask them during your visits  © 2017 [address] Information is for End User's use only and may not be sold, redistributed or otherwise used for commercial purposes  All illustrations and images included in CareNotes® are the copyrighted property of A D A M [employer] Inc  or Franco Shah  The above information is an  only  It is not intended as medical advice for individual conditions or treatments  Talk to your doctor, nurse or pharmacist before following any medical regimen to see if it is safe and effective for you
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
## 2019-12-02 ENCOUNTER — CONSULT (OUTPATIENT)
Dept: HEMATOLOGY ONCOLOGY | Facility: MEDICAL CENTER | Age: 49
End: 2019-12-02
Payer: COMMERCIAL

## 2019-12-02 VITALS
HEART RATE: 124 BPM | BODY MASS INDEX: 19.4 KG/M2 | DIASTOLIC BLOOD PRESSURE: 60 MMHG | TEMPERATURE: 97.4 F | OXYGEN SATURATION: 98 % | HEIGHT: 68 IN | SYSTOLIC BLOOD PRESSURE: 138 MMHG | WEIGHT: 128 LBS | RESPIRATION RATE: 16 BRPM

## 2019-12-02 DIAGNOSIS — C81.90 HODGKIN LYMPHOMA, UNSPECIFIED HODGKIN LYMPHOMA TYPE, UNSPECIFIED BODY REGION (HCC): Primary | ICD-10-CM

## 2019-12-02 PROCEDURE — 99244 OFF/OP CNSLTJ NEW/EST MOD 40: CPT | Performed by: INTERNAL MEDICINE

## 2019-12-02 RX ORDER — QUETIAPINE FUMARATE 25 MG/1
25 TABLET, FILM COATED ORAL
COMMUNITY
End: 2020-02-25

## 2019-12-02 RX ORDER — LEVOTHYROXINE SODIUM 0.03 MG/1
50 TABLET ORAL DAILY
COMMUNITY
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
## 2019-12-10 ENCOUNTER — TELEPHONE (OUTPATIENT)
Dept: HEMATOLOGY ONCOLOGY | Facility: MEDICAL CENTER | Age: 49
End: 2019-12-10

## 2019-12-10 ENCOUNTER — DOCUMENTATION (OUTPATIENT)
Dept: HEMATOLOGY ONCOLOGY | Facility: MEDICAL CENTER | Age: 49
End: 2019-12-10

## 2019-12-10 DIAGNOSIS — C81.90 HODGKIN LYMPHOMA, UNSPECIFIED HODGKIN LYMPHOMA TYPE, UNSPECIFIED BODY REGION (HCC): Primary | ICD-10-CM

## 2019-12-10 NOTE — PROGRESS NOTES
Spoke to Arun Sandra, made her aware that the PET was denied by her insurance and that Dr Lev Caraballo would like her to have a CT n/c/a/p instead  The CT is imtiaz for SLW 12/18/19 815am  Pt will stop by the office to  the script and also the 2 bottles of barium

## 2019-12-18 ENCOUNTER — HOSPITAL ENCOUNTER (OUTPATIENT)
Dept: RADIOLOGY | Facility: HOSPITAL | Age: 49
Discharge: HOME | End: 2019-12-18
Attending: INTERNAL MEDICINE
Payer: COMMERCIAL

## 2019-12-18 DIAGNOSIS — C81.90 HODGKIN LYMPHOMA, UNSPECIFIED HODGKIN LYMPHOMA TYPE, UNSPECIFIED BODY REGION (HCC): ICD-10-CM

## 2019-12-18 PROCEDURE — 74177 CT ABD & PELVIS W/CONTRAST: CPT

## 2019-12-18 PROCEDURE — 70491 CT SOFT TISSUE NECK W/DYE: CPT

## 2019-12-18 PROCEDURE — 71260 CT THORAX DX C+: CPT

## 2019-12-18 RX ADMIN — IOHEXOL 100 ML: 350 INJECTION, SOLUTION INTRAVENOUS at 08:28

## 2019-12-19 LAB
ERYTHROCYTE [SEDIMENTATION RATE] IN BLOOD BY WESTERGREN METHOD: 16 MM/HR (ref 0–40)
LDH SERPL-CCNC: 177 IU/L (ref 119–226)

## 2019-12-20 LAB
HAV IGM SERPL QL IA: NEGATIVE
HBV CORE IGM SERPL QL IA: NEGATIVE
HBV SURFACE AG SERPL QL IA: NEGATIVE
HCV AB S/CO SERPL IA: >11 S/CO RATIO (ref 0–0.9)

## 2019-12-23 LAB
HGB FRACT BLD-IMP: NEGATIVE
HIV1 AB SERPL QL IA: NEGATIVE
SL AMB FINAL INTERPRETATION: NORMAL
SL AMB HIV 1 RNA QUALITATIVE: NEGATIVE
SL AMB HIV 2 AB: NEGATIVE

## 2020-01-07 ENCOUNTER — OFFICE VISIT (OUTPATIENT)
Dept: HEMATOLOGY ONCOLOGY | Facility: MEDICAL CENTER | Age: 50
End: 2020-01-07
Payer: COMMERCIAL

## 2020-01-07 VITALS
RESPIRATION RATE: 16 BRPM | WEIGHT: 130 LBS | HEIGHT: 68 IN | DIASTOLIC BLOOD PRESSURE: 68 MMHG | OXYGEN SATURATION: 99 % | HEART RATE: 110 BPM | BODY MASS INDEX: 19.7 KG/M2 | TEMPERATURE: 97.1 F | SYSTOLIC BLOOD PRESSURE: 124 MMHG

## 2020-01-07 DIAGNOSIS — C81.90 HODGKIN LYMPHOMA, UNSPECIFIED HODGKIN LYMPHOMA TYPE, UNSPECIFIED BODY REGION (HCC): Primary | ICD-10-CM

## 2020-01-07 PROCEDURE — 99213 OFFICE O/P EST LOW 20 MIN: CPT | Performed by: INTERNAL MEDICINE
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
## 2020-01-10 ENCOUNTER — OFFICE VISIT (OUTPATIENT)
Dept: GASTROENTEROLOGY | Facility: CLINIC | Age: 50
End: 2020-01-10
Payer: COMMERCIAL

## 2020-01-10 VITALS
TEMPERATURE: 98.4 F | HEIGHT: 68 IN | DIASTOLIC BLOOD PRESSURE: 78 MMHG | WEIGHT: 133.13 LBS | BODY MASS INDEX: 20.18 KG/M2 | SYSTOLIC BLOOD PRESSURE: 126 MMHG | HEART RATE: 111 BPM

## 2020-01-10 DIAGNOSIS — R10.12 LEFT UPPER QUADRANT PAIN: ICD-10-CM

## 2020-01-10 DIAGNOSIS — R58 BLEEDING: ICD-10-CM

## 2020-01-10 DIAGNOSIS — B19.20 HEPATITIS C VIRUS INFECTION WITHOUT HEPATIC COMA, UNSPECIFIED CHRONICITY: Primary | ICD-10-CM

## 2020-01-10 DIAGNOSIS — R63.4 WEIGHT LOSS: ICD-10-CM

## 2020-01-10 DIAGNOSIS — E87.5 HYPERKALEMIA: ICD-10-CM

## 2020-01-10 DIAGNOSIS — R19.7 DIARRHEA, UNSPECIFIED TYPE: ICD-10-CM

## 2020-01-10 PROCEDURE — 99244 OFF/OP CNSLTJ NEW/EST MOD 40: CPT | Performed by: INTERNAL MEDICINE

## 2020-01-10 NOTE — PATIENT INSTRUCTIONS
Hepatitis C  - check blood test  - check ultrasound    Abd, nausea, and vomiting; history of crohn's disease  - schedule EGD and colonoscopy with golytely    Diarrhea  - check blood and stool tests    Easy bleeding  - check blood test for coagulation factors    Weight loss  - check blood test for TB    Elevated K  - check blood test to check K level again    Follow up 1 month
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
## 2020-01-17 ENCOUNTER — TELEPHONE (OUTPATIENT)
Dept: GASTROENTEROLOGY | Facility: AMBULARY SURGERY CENTER | Age: 50
End: 2020-01-17

## 2020-01-17 DIAGNOSIS — K50.90 CROHN'S DISEASE WITHOUT COMPLICATION, UNSPECIFIED GASTROINTESTINAL TRACT LOCATION (HCC): Primary | ICD-10-CM

## 2020-01-17 NOTE — TELEPHONE ENCOUNTER
Patients GI provider:  Dr Jigar Villasenor    Number to return call: ( na    Reason for call: Pt calling to have colon prep called into [facility]    Scheduled procedure/appointment date if applicable: Apt/procedure colon 2-26-20

## 2020-01-22 ENCOUNTER — HOSPITAL ENCOUNTER (OUTPATIENT)
Dept: RADIOLOGY | Facility: HOSPITAL | Age: 50
Discharge: HOME | End: 2020-01-22
Attending: INTERNAL MEDICINE
Payer: COMMERCIAL

## 2020-01-22 DIAGNOSIS — B19.20 HEPATITIS C VIRUS INFECTION WITHOUT HEPATIC COMA, UNSPECIFIED CHRONICITY: ICD-10-CM

## 2020-01-22 PROCEDURE — 76705 ECHO EXAM OF ABDOMEN: CPT

## 2020-01-24 LAB
ALBUMIN SERPL-MCNC: 4.4 G/DL (ref 3.8–4.8)
ALBUMIN/GLOB SERPL: 2.3 {RATIO} (ref 1.2–2.2)
ALP SERPL-CCNC: 45 IU/L (ref 39–117)
ALT SERPL-CCNC: 8 IU/L (ref 0–32)
AST SERPL-CCNC: 20 IU/L (ref 0–40)
BILIRUB SERPL-MCNC: 0.3 MG/DL (ref 0–1.2)
BUN SERPL-MCNC: 15 MG/DL (ref 6–24)
BUN/CREAT SERPL: 17 (ref 9–23)
CALCIUM SERPL-MCNC: 9.4 MG/DL (ref 8.7–10.2)
CHLORIDE SERPL-SCNC: 106 MMOL/L (ref 96–106)
CO2 SERPL-SCNC: 21 MMOL/L (ref 20–29)
CREAT SERPL-MCNC: 0.86 MG/DL (ref 0.57–1)
CRP SERPL-MCNC: <1 MG/L (ref 0–10)
GAMMA INTERFERON BACKGROUND BLD IA-ACNC: 0.03 IU/ML
GLOBULIN SER-MCNC: 1.9 G/DL (ref 1.5–4.5)
GLUCOSE SERPL-MCNC: 99 MG/DL (ref 65–99)
HCV RNA SERPL NAA+PROBE-ACNC: NORMAL IU/ML
INR PPP: 1 (ref 0.8–1.2)
M TB IFN-G CD4+ T-CELLS BLD-ACNC: 0.04 IU/ML
M TB IFN-G CD4+ T-CELLS BLD-ACNC: 0.04 IU/ML
MITOGEN IGNF BLD-ACNC: >10 IU/ML
POTASSIUM SERPL-SCNC: 4.5 MMOL/L (ref 3.5–5.2)
PROT SERPL-MCNC: 6.3 G/DL (ref 6–8.5)
PROTHROMBIN TIME: 10.5 SEC (ref 9.1–12)
QUANTIFERON INCUBATION COMMENT: NORMAL
QUANTIFERON-TB GOLD PLUS: NEGATIVE
SERVICE CMNT-IMP: NORMAL
SL AMB EGFR AFRICAN AMERICAN: 91 ML/MIN/1.73
SL AMB EGFR NON AFRICAN AMERICAN: 79 ML/MIN/1.73
SODIUM SERPL-SCNC: 141 MMOL/L (ref 134–144)
TEST INFORMATION: NORMAL

## 2020-01-28 ENCOUNTER — TELEPHONE (OUTPATIENT)
Dept: GASTROENTEROLOGY | Facility: AMBULARY SURGERY CENTER | Age: 50
End: 2020-01-28

## 2020-01-28 ENCOUNTER — TRANSCRIBE ORDERS (OUTPATIENT)
Dept: GASTROENTEROLOGY | Facility: CLINIC | Age: 50
End: 2020-01-28

## 2020-01-30 ENCOUNTER — TELEPHONE (OUTPATIENT)
Dept: GASTROENTEROLOGY | Facility: CLINIC | Age: 50
End: 2020-01-30

## 2020-01-30 NOTE — TELEPHONE ENCOUNTER
Patients GI provider:  Dr Malik Delgado    Number to return call: (    Reason for call: Pt calling requesting to speak with you about Hep C    Scheduled procedure/appointment date if applicable: Apt/procedure

## 2020-01-30 NOTE — TELEPHONE ENCOUNTER
Patient with hx of hodgkins disease was recently seen in  Office 1/10 for symptoms diarrhea, easy bruising, weight loss  She is scheduled for EGD/Colonoscopy 2/26  She called to ask what she should do in the meantime to control her symptom of fatigue  I briefly  reviewed recent labs  Although hepatitis panel showed ">11" hep c AB on 12/19, hepatitis c viral load on 1/22 was not detected  We discussed this typically indicates she was exposed and infected with hep c virus at some point but has now cleared the virus  No treatment needed at this time  Other labs dated 1/22 appeared unremarkable, US unremarkable, all to be reviewed by physician  Boaz Weiss She was satisfied and appreciative to know fatigue was not caused by hepatitis C  She will go for fecal calprotectin, IgA and cryoglubulin as well and keep f/u appointments as scheduled

## 2020-01-31 DIAGNOSIS — K50.90 CROHN'S DISEASE WITHOUT COMPLICATION, UNSPECIFIED GASTROINTESTINAL TRACT LOCATION (HCC): Primary | ICD-10-CM

## 2020-01-31 NOTE — TELEPHONE ENCOUNTER
<amrit please call pt back, she has questions and concerns regarding her Hep C and when she can start treatment   Pt can be reached [phone]
Patients GI provider:  Dr Camelia Daily    Number to return call: ( [phone]    Reason for call: Pt calling to ask why she is not being treated for her symptoms until march    Scheduled procedure/appointment date if applicable: Apt/procedure 3-4-20
Please advise
Reached , left message to call back  When she calls back, will advise Hep C viral load on 1/22 was not detected  No treatment for hepatitis C will be necessary 
See other task
patient that she is scheduled for her procedure 2/26 and I was able to move her 1 month office visit up to 2/21 at 10am as per her last visit in January, however keeping her March apt maybe more beneficial to have her procedure results back as well 
(3) occasionally moist

## 2020-02-04 ENCOUNTER — TELEPHONE (OUTPATIENT)
Dept: GASTROENTEROLOGY | Facility: AMBULARY SURGERY CENTER | Age: 50
End: 2020-02-04

## 2020-02-04 NOTE — TELEPHONE ENCOUNTER
----- Message from [facility], MD sent at 2/3/2020 11:35 AM EST -----  Please let pt know that ultrasound of liver, gallbladder and bile ducts was normal      Thank you,  Marino Linda

## 2020-02-04 NOTE — TELEPHONE ENCOUNTER
----- Message from Isaias Pruett MD sent at 2/3/2020 11:43 AM EST -----  Please let pt know that labs showed:   Hepatitis C- negative  Liver and kidney tests- normal  Test for marker of inflammation- negative    Thank you,  Swati Carroll

## 2020-02-17 ENCOUNTER — TELEPHONE (OUTPATIENT)
Dept: GASTROENTEROLOGY | Facility: AMBULARY SURGERY CENTER | Age: 50
End: 2020-02-17

## 2020-02-21 ENCOUNTER — OFFICE VISIT (OUTPATIENT)
Dept: GASTROENTEROLOGY | Facility: CLINIC | Age: 50
End: 2020-02-21
Payer: COMMERCIAL

## 2020-02-21 ENCOUNTER — TELEPHONE (OUTPATIENT)
Dept: GASTROENTEROLOGY | Facility: CLINIC | Age: 50
End: 2020-02-21

## 2020-02-21 ENCOUNTER — APPOINTMENT (EMERGENCY)
Dept: RADIOLOGY | Facility: HOSPITAL | Age: 50
End: 2020-02-21
Payer: COMMERCIAL

## 2020-02-21 ENCOUNTER — HOSPITAL ENCOUNTER (EMERGENCY)
Facility: HOSPITAL | Age: 50
Discharge: HOME | End: 2020-02-21
Attending: EMERGENCY MEDICINE
Payer: COMMERCIAL

## 2020-02-21 VITALS
TEMPERATURE: 97.9 F | HEART RATE: 152 BPM | BODY MASS INDEX: 19.55 KG/M2 | WEIGHT: 129 LBS | SYSTOLIC BLOOD PRESSURE: 124 MMHG | HEIGHT: 68 IN | DIASTOLIC BLOOD PRESSURE: 72 MMHG

## 2020-02-21 VITALS
TEMPERATURE: 97.5 F | SYSTOLIC BLOOD PRESSURE: 119 MMHG | RESPIRATION RATE: 20 BRPM | DIASTOLIC BLOOD PRESSURE: 59 MMHG | HEART RATE: 95 BPM | OXYGEN SATURATION: 98 %

## 2020-02-21 DIAGNOSIS — R00.2 PALPITATIONS: Primary | ICD-10-CM

## 2020-02-21 DIAGNOSIS — K50.919 CROHN'S DISEASE WITH COMPLICATION, UNSPECIFIED GASTROINTESTINAL TRACT LOCATION (HCC): Primary | ICD-10-CM

## 2020-02-21 DIAGNOSIS — R19.7 DIARRHEA, UNSPECIFIED TYPE: ICD-10-CM

## 2020-02-21 DIAGNOSIS — R10.10 PAIN OF UPPER ABDOMEN: ICD-10-CM

## 2020-02-21 DIAGNOSIS — R00.0 TACHYCARDIA: ICD-10-CM

## 2020-02-21 LAB
ALBUMIN SERPL BCP-MCNC: 4.2 G/DL (ref 3.5–5)
ALP SERPL-CCNC: 50 U/L (ref 46–116)
ALT SERPL W P-5'-P-CCNC: 18 U/L (ref 12–78)
ANION GAP SERPL CALCULATED.3IONS-SCNC: 9 MMOL/L (ref 4–13)
APTT PPP: 28 SECONDS (ref 25–32)
AST SERPL W P-5'-P-CCNC: 24 U/L (ref 5–45)
BASOPHILS # BLD AUTO: 0.04 THOUSANDS/ΜL (ref 0–0.1)
BASOPHILS NFR BLD AUTO: 1 % (ref 0–1)
BILIRUB SERPL-MCNC: 0.4 MG/DL (ref 0.2–1)
BUN SERPL-MCNC: 20 MG/DL (ref 5–25)
CALCIUM SERPL-MCNC: 9.4 MG/DL (ref 8.3–10.1)
CHLORIDE SERPL-SCNC: 103 MMOL/L (ref 100–108)
CO2 SERPL-SCNC: 26 MMOL/L (ref 21–32)
CREAT SERPL-MCNC: 0.91 MG/DL (ref 0.6–1.3)
EOSINOPHIL # BLD AUTO: 0.11 THOUSAND/ΜL (ref 0–0.61)
EOSINOPHIL NFR BLD AUTO: 3 % (ref 0–6)
ERYTHROCYTE [DISTWIDTH] IN BLOOD BY AUTOMATED COUNT: 12.7 % (ref 11.6–15.1)
GFR SERPL CREATININE-BSD FRML MDRD: 74 ML/MIN/1.73SQ M
GLUCOSE SERPL-MCNC: 91 MG/DL (ref 65–140)
HCT VFR BLD AUTO: 46.4 % (ref 34.8–46.1)
HGB BLD-MCNC: 15.5 G/DL (ref 11.5–15.4)
IMM GRANULOCYTES # BLD AUTO: 0.01 THOUSAND/UL (ref 0–0.2)
IMM GRANULOCYTES NFR BLD AUTO: 0 % (ref 0–2)
INR PPP: 0.99 (ref 0.91–1.09)
LYMPHOCYTES # BLD AUTO: 1.03 THOUSANDS/ΜL (ref 0.6–4.47)
LYMPHOCYTES NFR BLD AUTO: 23 % (ref 14–44)
MCH RBC QN AUTO: 33.3 PG (ref 26.8–34.3)
MCHC RBC AUTO-ENTMCNC: 33.4 G/DL (ref 31.4–37.4)
MCV RBC AUTO: 100 FL (ref 82–98)
MONOCYTES # BLD AUTO: 0.55 THOUSAND/ΜL (ref 0.17–1.22)
MONOCYTES NFR BLD AUTO: 12 % (ref 4–12)
NEUTROPHILS # BLD AUTO: 2.7 THOUSANDS/ΜL (ref 1.85–7.62)
NEUTS SEG NFR BLD AUTO: 61 % (ref 43–75)
NRBC BLD AUTO-RTO: 0 /100 WBCS
PLATELET # BLD AUTO: 220 THOUSANDS/UL (ref 149–390)
PMV BLD AUTO: 9.9 FL (ref 8.9–12.7)
POTASSIUM SERPL-SCNC: 4.2 MMOL/L (ref 3.5–5.3)
PROT SERPL-MCNC: 7.5 G/DL (ref 6.4–8.2)
PROTHROMBIN TIME: 10.6 SECONDS (ref 9.8–12)
RBC # BLD AUTO: 4.65 MILLION/UL (ref 3.81–5.12)
SODIUM SERPL-SCNC: 138 MMOL/L (ref 136–145)
TROPONIN I SERPL-MCNC: 0.02 NG/ML
TROPONIN I SERPL-MCNC: <0.02 NG/ML
TSH SERPL DL<=0.05 MIU/L-ACNC: 2.97 UIU/ML (ref 0.36–3.74)
WBC # BLD AUTO: 4.44 THOUSAND/UL (ref 4.31–10.16)

## 2020-02-21 PROCEDURE — 84443 ASSAY THYROID STIM HORMONE: CPT | Performed by: EMERGENCY MEDICINE

## 2020-02-21 PROCEDURE — 84484 ASSAY OF TROPONIN QUANT: CPT | Performed by: EMERGENCY MEDICINE

## 2020-02-21 PROCEDURE — 93005 ELECTROCARDIOGRAM TRACING: CPT

## 2020-02-21 PROCEDURE — 96361 HYDRATE IV INFUSION ADD-ON: CPT

## 2020-02-21 PROCEDURE — 36415 COLL VENOUS BLD VENIPUNCTURE: CPT | Performed by: EMERGENCY MEDICINE

## 2020-02-21 PROCEDURE — 85025 COMPLETE CBC W/AUTO DIFF WBC: CPT | Performed by: EMERGENCY MEDICINE

## 2020-02-21 PROCEDURE — 99285 EMERGENCY DEPT VISIT HI MDM: CPT

## 2020-02-21 PROCEDURE — 96360 HYDRATION IV INFUSION INIT: CPT

## 2020-02-21 PROCEDURE — 85730 THROMBOPLASTIN TIME PARTIAL: CPT | Performed by: EMERGENCY MEDICINE

## 2020-02-21 PROCEDURE — 99283 EMERGENCY DEPT VISIT LOW MDM: CPT | Performed by: EMERGENCY MEDICINE

## 2020-02-21 PROCEDURE — 71045 X-RAY EXAM CHEST 1 VIEW: CPT

## 2020-02-21 PROCEDURE — 80053 COMPREHEN METABOLIC PANEL: CPT | Performed by: EMERGENCY MEDICINE

## 2020-02-21 PROCEDURE — 85610 PROTHROMBIN TIME: CPT | Performed by: EMERGENCY MEDICINE

## 2020-02-21 PROCEDURE — 99214 OFFICE O/P EST MOD 30 MIN: CPT | Performed by: INTERNAL MEDICINE

## 2020-02-21 RX ADMIN — SODIUM CHLORIDE 1000 ML: 0.9 INJECTION, SOLUTION INTRAVENOUS at 12:08
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
## 2020-02-21 NOTE — PATIENT INSTRUCTIONS
Tachycardia  - go to ER for tachycardia     History of Crohns disease and abdominal pain  - proceed with EGD and colonoscopy  - consider MR enterography (MRI to look at small bowel) in future  - check blood tests (for vitamin D level) and stool tests (fecal calprotectin)  - start multivitamin with vit D and calcium  - quit smoking  - get flu shot at PCP  - schedule pap smear for cervical cancer screening  - avoid sun exposure and wear sunscreen    Diarrhea  -  Check blood tests for Celiac disease    Follow up in 3 months
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
## 2020-02-22 ENCOUNTER — HOSPITAL ENCOUNTER (EMERGENCY)
Facility: HOSPITAL | Age: 50
Discharge: HOME | End: 2020-02-22
Attending: EMERGENCY MEDICINE | Admitting: EMERGENCY MEDICINE
Payer: COMMERCIAL

## 2020-02-22 VITALS
HEART RATE: 96 BPM | DIASTOLIC BLOOD PRESSURE: 60 MMHG | BODY MASS INDEX: 19.61 KG/M2 | RESPIRATION RATE: 17 BRPM | OXYGEN SATURATION: 98 % | SYSTOLIC BLOOD PRESSURE: 113 MMHG | WEIGHT: 129 LBS | TEMPERATURE: 97.6 F

## 2020-02-22 DIAGNOSIS — I47.1 SVT (SUPRAVENTRICULAR TACHYCARDIA) (HCC): Primary | ICD-10-CM

## 2020-02-22 LAB
ATRIAL RATE: 92 BPM
P AXIS: 68 DEGREES
PR INTERVAL: 124 MS
QRS AXIS: 20 DEGREES
QRSD INTERVAL: 88 MS
QT INTERVAL: 382 MS
QTC INTERVAL: 472 MS
T WAVE AXIS: 99 DEGREES
VENTRICULAR RATE: 92 BPM

## 2020-02-22 PROCEDURE — 96375 TX/PRO/DX INJ NEW DRUG ADDON: CPT

## 2020-02-22 PROCEDURE — 99285 EMERGENCY DEPT VISIT HI MDM: CPT | Performed by: EMERGENCY MEDICINE

## 2020-02-22 PROCEDURE — 93005 ELECTROCARDIOGRAM TRACING: CPT

## 2020-02-22 PROCEDURE — 93010 ELECTROCARDIOGRAM REPORT: CPT | Performed by: INTERNAL MEDICINE

## 2020-02-22 PROCEDURE — 96374 THER/PROPH/DIAG INJ IV PUSH: CPT

## 2020-02-22 PROCEDURE — 99285 EMERGENCY DEPT VISIT HI MDM: CPT

## 2020-02-22 RX ORDER — ADENOSINE 3 MG/ML
6 INJECTION INTRAVENOUS ONCE
Status: COMPLETED | OUTPATIENT
Start: 2020-02-22 | End: 2020-02-22

## 2020-02-22 RX ORDER — ADENOSINE 3 MG/ML
INJECTION, SOLUTION INTRAVENOUS
Status: COMPLETED
Start: 2020-02-22 | End: 2020-02-22

## 2020-02-22 RX ORDER — LORAZEPAM 2 MG/ML
0.5 INJECTION INTRAMUSCULAR ONCE
Status: COMPLETED | OUTPATIENT
Start: 2020-02-22 | End: 2020-02-22

## 2020-02-22 RX ADMIN — ADENOSINE 6 MG: 3 INJECTION, SOLUTION INTRAVENOUS at 16:20

## 2020-02-22 RX ADMIN — ADENOSINE 6 MG: 3 INJECTION INTRAVENOUS at 16:20

## 2020-02-22 RX ADMIN — LORAZEPAM 0.5 MG: 2 INJECTION INTRAMUSCULAR; INTRAVENOUS at 16:37
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
## 2020-02-24 ENCOUNTER — OFFICE VISIT (OUTPATIENT)
Dept: CARDIOLOGY CLINIC | Facility: CLINIC | Age: 50
End: 2020-02-24
Payer: COMMERCIAL

## 2020-02-24 ENCOUNTER — HOSPITAL ENCOUNTER (EMERGENCY)
Facility: HOSPITAL | Age: 50
Discharge: HOME | End: 2020-02-24
Attending: EMERGENCY MEDICINE
Payer: COMMERCIAL

## 2020-02-24 VITALS
BODY MASS INDEX: 19.1 KG/M2 | HEART RATE: 106 BPM | DIASTOLIC BLOOD PRESSURE: 71 MMHG | TEMPERATURE: 97.4 F | OXYGEN SATURATION: 98 % | RESPIRATION RATE: 16 BRPM | WEIGHT: 125.6 LBS | SYSTOLIC BLOOD PRESSURE: 121 MMHG

## 2020-02-24 VITALS
HEIGHT: 68 IN | WEIGHT: 127 LBS | BODY MASS INDEX: 19.25 KG/M2 | DIASTOLIC BLOOD PRESSURE: 70 MMHG | OXYGEN SATURATION: 97 % | SYSTOLIC BLOOD PRESSURE: 102 MMHG | HEART RATE: 179 BPM

## 2020-02-24 DIAGNOSIS — I47.1 SVT (SUPRAVENTRICULAR TACHYCARDIA) (HCC): Primary | ICD-10-CM

## 2020-02-24 DIAGNOSIS — R07.89 TIGHTNESS IN CHEST: ICD-10-CM

## 2020-02-24 DIAGNOSIS — R06.02 SHORTNESS OF BREATH: ICD-10-CM

## 2020-02-24 DIAGNOSIS — R00.0 TACHYCARDIA: ICD-10-CM

## 2020-02-24 DIAGNOSIS — I47.1 PSVT (PAROXYSMAL SUPRAVENTRICULAR TACHYCARDIA) (HCC): ICD-10-CM

## 2020-02-24 PROCEDURE — 96374 THER/PROPH/DIAG INJ IV PUSH: CPT

## 2020-02-24 PROCEDURE — 93000 ELECTROCARDIOGRAM COMPLETE: CPT | Performed by: INTERNAL MEDICINE

## 2020-02-24 PROCEDURE — 99285 EMERGENCY DEPT VISIT HI MDM: CPT

## 2020-02-24 PROCEDURE — 99245 OFF/OP CONSLTJ NEW/EST HI 55: CPT | Performed by: INTERNAL MEDICINE

## 2020-02-24 PROCEDURE — 96361 HYDRATE IV INFUSION ADD-ON: CPT

## 2020-02-24 PROCEDURE — 99291 CRITICAL CARE FIRST HOUR: CPT | Performed by: EMERGENCY MEDICINE

## 2020-02-24 PROCEDURE — 93005 ELECTROCARDIOGRAM TRACING: CPT

## 2020-02-24 RX ORDER — ADENOSINE 3 MG/ML
6 INJECTION INTRAVENOUS ONCE
Status: COMPLETED | OUTPATIENT
Start: 2020-02-24 | End: 2020-02-24

## 2020-02-24 RX ADMIN — ADENOSINE 6 MG: 3 INJECTION INTRAVENOUS at 14:48

## 2020-02-24 RX ADMIN — METOPROLOL TARTRATE 25 MG: 25 TABLET ORAL at 15:58

## 2020-02-24 RX ADMIN — SODIUM CHLORIDE 1000 ML: 0.9 INJECTION, SOLUTION INTRAVENOUS at 14:42
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
## 2020-02-25 ENCOUNTER — ANESTHESIA EVENT (OUTPATIENT)
Dept: GASTROENTEROLOGY | Facility: AMBULARY SURGERY CENTER | Age: 50
End: 2020-02-25

## 2020-02-25 RX ORDER — IBUPROFEN 200 MG
TABLET ORAL EVERY 6 HOURS PRN
COMMUNITY

## 2020-02-25 NOTE — PRE-PROCEDURE INSTRUCTIONS
Pre-Surgery Instructions:   Medication Instructions    ALPRAZolam (XANAX) 0 5 mg tablet Patient was instructed by Physician and understands   ibuprofen (MOTRIN) 200 mg tablet Patient was instructed by Physician and understands   levothyroxine (SYNTHROID) 25 mcg tablet Instructed patient per Anesthesia Guidelines   metoprolol tartrate (LOPRESSOR) 25 mg tablet Instructed patient per Anesthesia Guidelines

## 2020-02-26 ENCOUNTER — HOSPITAL ENCOUNTER (OUTPATIENT)
Dept: GASTROENTEROLOGY | Facility: AMBULARY SURGERY CENTER | Age: 50
Setting detail: OUTPATIENT SURGERY
Discharge: HOME | End: 2020-02-26
Attending: INTERNAL MEDICINE | Admitting: INTERNAL MEDICINE
Payer: COMMERCIAL

## 2020-02-26 ENCOUNTER — ANESTHESIA (OUTPATIENT)
Dept: GASTROENTEROLOGY | Facility: AMBULARY SURGERY CENTER | Age: 50
End: 2020-02-26

## 2020-02-26 VITALS
SYSTOLIC BLOOD PRESSURE: 171 MMHG | OXYGEN SATURATION: 100 % | RESPIRATION RATE: 16 BRPM | TEMPERATURE: 96.2 F | HEART RATE: 80 BPM | DIASTOLIC BLOOD PRESSURE: 73 MMHG

## 2020-02-26 DIAGNOSIS — K29.00 ACUTE GASTRITIS WITHOUT HEMORRHAGE, UNSPECIFIED GASTRITIS TYPE: Primary | ICD-10-CM

## 2020-02-26 DIAGNOSIS — B19.20 HEPATITIS C VIRUS INFECTION WITHOUT HEPATIC COMA, UNSPECIFIED CHRONICITY: ICD-10-CM

## 2020-02-26 DIAGNOSIS — R19.7 DIARRHEA, UNSPECIFIED TYPE: ICD-10-CM

## 2020-02-26 DIAGNOSIS — R58 BLEEDING: ICD-10-CM

## 2020-02-26 DIAGNOSIS — R10.12 LEFT UPPER QUADRANT PAIN: ICD-10-CM

## 2020-02-26 DIAGNOSIS — R63.4 WEIGHT LOSS: ICD-10-CM

## 2020-02-26 LAB
ATRIAL RATE: 104 BPM
ATRIAL RATE: 105 BPM
ATRIAL RATE: 169 BPM
ATRIAL RATE: 171 BPM
P AXIS: -79 DEGREES
P AXIS: -79 DEGREES
P AXIS: 66 DEGREES
P AXIS: 75 DEGREES
PR INTERVAL: 116 MS
PR INTERVAL: 118 MS
PR INTERVAL: 98 MS
PR INTERVAL: 98 MS
QRS AXIS: 28 DEGREES
QRS AXIS: 30 DEGREES
QRS AXIS: 35 DEGREES
QRS AXIS: 6 DEGREES
QRSD INTERVAL: 82 MS
QRSD INTERVAL: 86 MS
QRSD INTERVAL: 88 MS
QRSD INTERVAL: 94 MS
QT INTERVAL: 246 MS
QT INTERVAL: 250 MS
QT INTERVAL: 352 MS
QT INTERVAL: 356 MS
QTC INTERVAL: 414 MS
QTC INTERVAL: 419 MS
QTC INTERVAL: 462 MS
QTC INTERVAL: 470 MS
T WAVE AXIS: 103 DEGREES
T WAVE AXIS: 104 DEGREES
T WAVE AXIS: 89 DEGREES
T WAVE AXIS: 90 DEGREES
VENTRICULAR RATE: 104 BPM
VENTRICULAR RATE: 105 BPM
VENTRICULAR RATE: 169 BPM
VENTRICULAR RATE: 171 BPM

## 2020-02-26 PROCEDURE — 45380 COLONOSCOPY AND BIOPSY: CPT | Performed by: INTERNAL MEDICINE

## 2020-02-26 PROCEDURE — 43239 EGD BIOPSY SINGLE/MULTIPLE: CPT | Performed by: INTERNAL MEDICINE

## 2020-02-26 PROCEDURE — 88305 TISSUE EXAM BY PATHOLOGIST: CPT | Performed by: PATHOLOGY

## 2020-02-26 PROCEDURE — 93010 ELECTROCARDIOGRAM REPORT: CPT | Performed by: INTERNAL MEDICINE

## 2020-02-26 RX ORDER — PROPOFOL 10 MG/ML
INJECTION, EMULSION INTRAVENOUS CONTINUOUS PRN
Status: DISCONTINUED | OUTPATIENT
Start: 2020-02-26 | End: 2020-02-26 | Stop reason: SURG

## 2020-02-26 RX ORDER — SODIUM CHLORIDE, SODIUM LACTATE, POTASSIUM CHLORIDE, CALCIUM CHLORIDE 600; 310; 30; 20 MG/100ML; MG/100ML; MG/100ML; MG/100ML
75 INJECTION, SOLUTION INTRAVENOUS CONTINUOUS
Status: DISCONTINUED | OUTPATIENT
Start: 2020-02-26 | End: 2020-03-01 | Stop reason: HOSPADM

## 2020-02-26 RX ORDER — LIDOCAINE HYDROCHLORIDE 10 MG/ML
INJECTION, SOLUTION EPIDURAL; INFILTRATION; INTRACAUDAL; PERINEURAL AS NEEDED
Status: DISCONTINUED | OUTPATIENT
Start: 2020-02-26 | End: 2020-02-26 | Stop reason: SURG

## 2020-02-26 RX ORDER — PANTOPRAZOLE SODIUM 40 MG/1
40 TABLET, DELAYED RELEASE ORAL DAILY
Qty: 30 TABLET | Refills: 3 | Status: SHIPPED | OUTPATIENT
Start: 2020-02-26 | End: 2020-11-12

## 2020-02-26 RX ORDER — PROPOFOL 10 MG/ML
INJECTION, EMULSION INTRAVENOUS AS NEEDED
Status: DISCONTINUED | OUTPATIENT
Start: 2020-02-26 | End: 2020-02-26 | Stop reason: SURG

## 2020-02-26 RX ADMIN — PROPOFOL 50 MG: 10 INJECTION, EMULSION INTRAVENOUS at 10:10

## 2020-02-26 RX ADMIN — LIDOCAINE HYDROCHLORIDE 30 MG: 10 INJECTION, SOLUTION EPIDURAL; INFILTRATION; INTRACAUDAL; PERINEURAL at 10:07

## 2020-02-26 RX ADMIN — PROPOFOL 100 MCG/KG/MIN: 10 INJECTION, EMULSION INTRAVENOUS at 10:06

## 2020-02-26 RX ADMIN — PROPOFOL 50 MG: 10 INJECTION, EMULSION INTRAVENOUS at 10:06

## 2020-02-26 RX ADMIN — PROPOFOL 50 MG: 10 INJECTION, EMULSION INTRAVENOUS at 10:30

## 2020-02-26 RX ADMIN — SODIUM CHLORIDE, SODIUM LACTATE, POTASSIUM CHLORIDE, AND CALCIUM CHLORIDE: .6; .31; .03; .02 INJECTION, SOLUTION INTRAVENOUS at 09:45

## 2020-02-26 RX ADMIN — LIDOCAINE HYDROCHLORIDE 30 MG: 10 INJECTION, SOLUTION EPIDURAL; INFILTRATION; INTRACAUDAL; PERINEURAL at 10:30
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
## 2020-02-26 NOTE — ANESTHESIA POSTPROCEDURE EVALUATION
Post-Op Assessment Note    CV Status:  Stable  Pain Score: 0    Pain management: adequate     Mental Status:  Awake   Hydration Status:  Stable   PONV Controlled:  None   Airway Patency:  Patent   Post Op Vitals Reviewed: Yes      Staff: CRNA   Comments: spontaneously breathing, HOB @ 30 degrees, vss, fully endorsed to recovery w/o AC          BP   104/67   Temp     Pulse  74   Resp   12   SpO2   100

## 2020-02-27 DIAGNOSIS — R10.84 GENERALIZED ABDOMINAL PAIN: Primary | ICD-10-CM

## 2020-02-27 RX ORDER — DICYCLOMINE HYDROCHLORIDE 10 MG/1
10 CAPSULE ORAL 3 TIMES DAILY PRN
Qty: 60 CAPSULE | Refills: 1 | Status: SHIPPED | OUTPATIENT
Start: 2020-02-27 | End: 2020-11-12

## 2020-02-27 NOTE — PROGRESS NOTES
Ms Yancy Ramos requesting pain medication for abdominal pain  Will start bentyl 10 mg TID prn      Ivis Gregg MD

## 2020-03-02 ENCOUNTER — HOSPITAL ENCOUNTER (OUTPATIENT)
Dept: NON INVASIVE DIAGNOSTICS | Facility: HOSPITAL | Age: 50
Discharge: HOME | End: 2020-03-02
Attending: INTERNAL MEDICINE
Payer: COMMERCIAL

## 2020-03-02 ENCOUNTER — TRANSCRIBE ORDERS (OUTPATIENT)
Dept: ADMINISTRATIVE | Facility: HOSPITAL | Age: 50
End: 2020-03-02

## 2020-03-02 DIAGNOSIS — R06.02 SHORTNESS OF BREATH: ICD-10-CM

## 2020-03-02 DIAGNOSIS — I47.1 PSVT (PAROXYSMAL SUPRAVENTRICULAR TACHYCARDIA) (HCC): ICD-10-CM

## 2020-03-02 DIAGNOSIS — R00.0 TACHYCARDIA: ICD-10-CM

## 2020-03-02 PROCEDURE — 93225 XTRNL ECG REC<48 HRS REC: CPT

## 2020-03-02 PROCEDURE — 93226 XTRNL ECG REC<48 HR SCAN A/R: CPT

## 2020-03-05 ENCOUNTER — TELEPHONE (OUTPATIENT)
Dept: GASTROENTEROLOGY | Facility: AMBULARY SURGERY CENTER | Age: 50
End: 2020-03-05

## 2020-03-05 NOTE — TELEPHONE ENCOUNTER
----- Message from [facility], MD sent at 3/2/2020  1:39 PM EST -----  Please let pt know that biopsies of stomach, small bowel and colon were normal      Biopsies from duodenum (upper part of small bowel) showed mild changes with inflammatory cells  This may be due to undiagnosed mild Celiac disease, previously diagnosed Crohn's disease or NSAIDs  She should avoid NSAIDs  Also she she have blood and stool tests ordered in clinic completed  Please schedule her for follow-up in clinic in one-month      Thank you,  Isaias Aviles

## 2020-03-10 ENCOUNTER — CONSULT (OUTPATIENT)
Dept: CARDIOLOGY CLINIC | Facility: CLINIC | Age: 50
End: 2020-03-10
Payer: COMMERCIAL

## 2020-03-10 VITALS — BODY MASS INDEX: 19.1 KG/M2 | HEIGHT: 68 IN

## 2020-03-10 DIAGNOSIS — I47.1 PSVT (PAROXYSMAL SUPRAVENTRICULAR TACHYCARDIA) (HCC): Primary | ICD-10-CM

## 2020-03-10 PROBLEM — I47.10 PSVT (PAROXYSMAL SUPRAVENTRICULAR TACHYCARDIA): Status: ACTIVE | Noted: 2020-03-10

## 2020-03-10 PROCEDURE — 93227 XTRNL ECG REC<48 HR R&I: CPT | Performed by: INTERNAL MEDICINE

## 2020-03-10 PROCEDURE — 99214 OFFICE O/P EST MOD 30 MIN: CPT | Performed by: INTERNAL MEDICINE

## 2020-03-10 NOTE — PROGRESS NOTES
EPS Consultation/New Patient Evaluation - Mingo Mendez 47 y o  female MRN: [id number]           ASSESSMENT:  1  PSVT (paroxysmal supraventricular tachycardia) ([facility] [address] )  Ambulatory referral to Cardiac Electrophysiology    POCT ECG           PLAN:  1  Symptomatic paroxysmal SVT  Patient has experienced at least 30 episodes since the age of 26years old  Her symptoms include breaking out in sweats, chest tightness, palpitations, difficulty taking a deep breath  In the past her episodes were self terminating except this last time in February where she was seen in the ER and was giving adenosine to terminate her episode  About 2 weeks ago she was placed on metoprolol and since then she has been experiencing significant fatigue  Proceed with SVT ablation    2  History of Hodgkin's disease          CC/HPI:   Mingo Mendez is a 47 y o  female who presents to the office today for an SVT consultation  Patient reports she has been experiencing episodes since the age of 26 and had about 30 episodes total where she thought they were anxiety attacks  Her symptoms include breaking out in sweats and chest tightness causing her difficulty taking a deep breath  There was only one time she went to the ER since she has been experiencing them which was last month where she was given adenosine terminating her episode  She was placed on Metoprolol about 2 weeks ago and has been experiencing significant fatigue  Patient admits to drink about 4 glasses of wine 4 days in a week and has significantly reduced smoking cigarettes to one cigarette a day  She had a family history of heart disease on her father side  Her father had a quadruple bypass and her brother passed from a heart attack  ROS:   Review of Systems   Constitution: Negative  HENT: Negative  Eyes: Negative for blurred vision and double vision  Cardiovascular: Positive for chest pain and palpitations   Negative for dyspnea on exertion, near-syncope and syncope  Respiratory: Positive for shortness of breath  Negative for cough and wheezing  Endocrine: Negative  Hematologic/Lymphatic: Negative  Skin: Negative  Musculoskeletal: Negative  Gastrointestinal: Negative  Genitourinary: Negative  Neurological: Negative  Psychiatric/Behavioral: Negative  Allergic/Immunologic: Negative  Objective:     Vitals: Height 5' 8" (1 727 m)  , Body mass index is 19 1 kg/m²  ,        Physical Exam:    GEN: Bary Perish appears well, alert and oriented x 3, pleasant and cooperative   HEENT: pupils equal, round, and reactive to light; extraocular muscles intact  NECK: supple, no carotid bruits   HEART: regular rhythm, normal S1 and S2, no murmurs, clicks, gallops or rubs   LUNGS: clear to auscultation bilaterally; no wheezes, rales, or rhonchi   ABDOMEN: normal bowel sounds, soft, no tenderness, no distention  EXTREMITIES: peripheral pulses normal; no clubbing, cyanosis, or edema  NEURO: no focal findings   SKIN: normal without suspicious lesions on exposed skin    Medications:      Current Outpatient Medications:     ALPRAZolam (XANAX) 0 5 mg tablet, Take 0 5 mg by mouth 2 (two) times a day, Disp: , Rfl: 0    ibuprofen (MOTRIN) 200 mg tablet, Take by mouth every 6 (six) hours as needed for mild pain, Disp: , Rfl:     levothyroxine (SYNTHROID) 25 mcg tablet, Take 25 mcg by mouth daily, Disp: , Rfl:     metoprolol tartrate (LOPRESSOR) 25 mg tablet, Take 1 tablet (25 mg total) by mouth every 12 (twelve) hours, Disp: 60 tablet, Rfl: 2    bisacodyl (DULCOLAX) 5 mg EC tablet, Take 2 tablets (10 mg total) by mouth once for 1 dose (Patient not taking: Reported on 2/21/2020), Disp: 2 tablet, Rfl: 0    dicyclomine (BENTYL) 10 mg capsule, Take 1 capsule (10 mg total) by mouth 3 (three) times a day as needed (abdominal pain) (Patient not taking: Reported on 3/10/2020), Disp: 60 capsule, Rfl: 1    pantoprazole (PROTONIX) 40 mg tablet, Take 1 tablet (40 mg total) by mouth daily (Patient not taking: Reported on 3/10/2020), Disp: 30 tablet, Rfl: 3    polyethylene glycol (GOLYTELY) 4000 mL solution, Take 4,000 mL by mouth once for 1 dose (Patient not taking: Reported on 2020), Disp: 4000 mL, Rfl: 0     Family History   Problem Relation Age of Onset    COPD Mother     Pancreatic cancer Mother     Emphysema Mother     Heart disease Father     Hypertension Father     Crohn's disease Sister     Crohn's disease Brother     Crohn's disease Sister     Diabetes Sister      Social History     Socioeconomic History    Marital status:      Spouse name: Not on file    Number of children: Not on file    Years of education: Not on file    Highest education level: Not on file   Occupational History    Not on file   Social Needs    Financial resource strain: Not on file    Food insecurity:     Worry: Not on file     Inability: Not on file    Transportation needs:     Medical: Not on file     Non-medical: Not on file   Tobacco Use    Smoking status: Former Smoker     Types: Cigarettes     Last attempt to quit: 2020     Years since quittin 0    Smokeless tobacco: Never Used    Tobacco comment: 1-3 cig a day   Substance and Sexual Activity    Alcohol use:  Yes     Alcohol/week: 1 0 standard drinks     Types: 1 Glasses of wine per week     Frequency: 2-4 times a month     Comment: occ    Drug use: Not Currently    Sexual activity: Not on file   Lifestyle    Physical activity:     Days per week: Not on file     Minutes per session: Not on file    Stress: Not on file   Relationships    Social connections:     Talks on phone: Not on file     Gets together: Not on file     Attends Baptism service: Not on file     Active member of club or organization: Not on file     Attends meetings of clubs or organizations: Not on file     Relationship status: Not on file    Intimate partner violence:     Fear of current or ex partner: Not on file     Emotionally abused: Not on file     Physically abused: Not on file     Forced sexual activity: Not on file   Other Topics Concern    Not on file   Social History Narrative    Not on file     Social History     Tobacco Use   Smoking Status Former Smoker    Types: Cigarettes    Last attempt to quit: 2020    Years since quittin 0   Smokeless Tobacco Never Used   Tobacco Comment    1-3 cig a day     Social History     Substance and Sexual Activity   Alcohol Use Yes    Alcohol/week: 1 0 standard drinks    Types: 1 Glasses of wine per week    Frequency: 2-4 times a month    Comment: occ       Labs & Results:  Below is the patient's most recent value for Albumin, ALT, AST, BUN, Calcium, Chloride, Cholesterol, CO2, Creatinine, GFR, Glucose, HDL, Hematocrit, Hemoglobin, Hemoglobin A1C, LDL, Magnesium, Phosphorus, Platelets, Potassium, PSA, Sodium, Triglycerides, and WBC  Lab Results   Component Value Date    ALT 18 2020    AST 24 2020    BUN 20 2020    CALCIUM 9 4 2020     2020    CO2 26 2020    CREATININE 0 91 2020    HCT 46 4 (H) 2020    HGB 15 5 (H) 2020    HGBA1C 5 6 10/25/2019    MG 2 1 2018    PHOS 3 1 2018     2020    K 4 2 2020    WBC 4 44 2020     Note: for a comprehensive list of the patient's lab results, access the Results Review activity  Cardiac testing:   No results found for this or any previous visit  No results found for this or any previous visit  No results found for this or any previous visit  No results found for this or any previous visit            Scribe Attestation    I,:   Kd Renae am acting as a scribe while in the presence of the attending physician :        I,:    personally performed the services described in this documentation    as scribed in my presence :

## 2020-03-11 ENCOUNTER — TELEPHONE (OUTPATIENT)
Dept: CARDIOLOGY CLINIC | Facility: CLINIC | Age: 50
End: 2020-03-11

## 2020-03-11 PROCEDURE — 93000 ELECTROCARDIOGRAM COMPLETE: CPT | Performed by: INTERNAL MEDICINE

## 2020-03-11 NOTE — TELEPHONE ENCOUNTER
Anay Adler,    Before I call the pt to schedule them for a SVT ablation  Pt has [employer] insurance would we be able to do the ablation?     Thank you

## 2020-03-11 NOTE — TELEPHONE ENCOUNTER
No   sShe has [address] which is [employer] and she has to stay in [address] [place] [address]

## 2020-03-20 NOTE — TELEPHONE ENCOUNTER
Called pt, gave her the info to Dr Sameer Soto in [facility], phone [phone]  She will call them for a consult

## 2020-04-21 ENCOUNTER — TELEMEDICINE (OUTPATIENT)
Dept: CARDIOLOGY CLINIC | Facility: CLINIC | Age: 50
End: 2020-04-21
Payer: COMMERCIAL

## 2020-04-21 VITALS — HEART RATE: 140 BPM

## 2020-04-21 DIAGNOSIS — E03.9 HYPOTHYROIDISM, UNSPECIFIED TYPE: ICD-10-CM

## 2020-04-21 DIAGNOSIS — C81.90 HODGKIN LYMPHOMA, UNSPECIFIED HODGKIN LYMPHOMA TYPE, UNSPECIFIED BODY REGION (HCC): ICD-10-CM

## 2020-04-21 DIAGNOSIS — R00.0 TACHYCARDIA: ICD-10-CM

## 2020-04-21 DIAGNOSIS — I47.1 PSVT (PAROXYSMAL SUPRAVENTRICULAR TACHYCARDIA) (HCC): ICD-10-CM

## 2020-04-21 PROCEDURE — 99213 OFFICE O/P EST LOW 20 MIN: CPT | Performed by: INTERNAL MEDICINE

## 2020-06-18 ENCOUNTER — TELEPHONE (OUTPATIENT)
Dept: CARDIOLOGY CLINIC | Facility: CLINIC | Age: 50
End: 2020-06-18

## 2020-06-19 DIAGNOSIS — I47.1 PSVT (PAROXYSMAL SUPRAVENTRICULAR TACHYCARDIA) (HCC): ICD-10-CM

## 2020-07-07 ENCOUNTER — TELEPHONE (OUTPATIENT)
Dept: CARDIOLOGY CLINIC | Facility: CLINIC | Age: 50
End: 2020-07-07

## 2020-07-16 ENCOUNTER — TELEPHONE (OUTPATIENT)
Dept: CARDIOLOGY CLINIC | Facility: CLINIC | Age: 50
End: 2020-07-16

## 2020-07-16 NOTE — TELEPHONE ENCOUNTER
Ablation done on 12th with Dr Hunter Low; heart rate average hr 85  She reports not feeling well, fatigued, palpitations  Sweats at night, in spurts  She's staying hydrated  Very little caffeine intake  No dietary changes  No lifestyle changes  Also encouraged to reach out to Dr Hunter Low

## 2020-07-16 NOTE — TELEPHONE ENCOUNTER
I spoke with patient, will call Dr Eduardo Caraballo, and will f/u with us on 7/27/2020  Patient made aware she will likely get an EKG in office with us  She had questions surrounding her Echocardiogram  She was made aware that if Dr Siri Washington found this necessary it would be ordered at this ov
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
## 2020-07-27 ENCOUNTER — OFFICE VISIT (OUTPATIENT)
Dept: CARDIOLOGY CLINIC | Facility: CLINIC | Age: 50
End: 2020-07-27
Payer: COMMERCIAL

## 2020-07-27 VITALS
SYSTOLIC BLOOD PRESSURE: 120 MMHG | BODY MASS INDEX: 18.79 KG/M2 | HEART RATE: 106 BPM | TEMPERATURE: 98.4 F | DIASTOLIC BLOOD PRESSURE: 70 MMHG | OXYGEN SATURATION: 97 % | HEIGHT: 68 IN | WEIGHT: 124 LBS

## 2020-07-27 DIAGNOSIS — E03.9 HYPOTHYROIDISM, UNSPECIFIED TYPE: ICD-10-CM

## 2020-07-27 DIAGNOSIS — C81.90 HODGKIN LYMPHOMA, UNSPECIFIED HODGKIN LYMPHOMA TYPE, UNSPECIFIED BODY REGION (HCC): ICD-10-CM

## 2020-07-27 DIAGNOSIS — R00.0 TACHYCARDIA: ICD-10-CM

## 2020-07-27 DIAGNOSIS — R94.31 ABNORMAL EKG: ICD-10-CM

## 2020-07-27 DIAGNOSIS — I47.1 PSVT (PAROXYSMAL SUPRAVENTRICULAR TACHYCARDIA) (HCC): ICD-10-CM

## 2020-07-27 DIAGNOSIS — R07.9 CHEST PAIN, UNSPECIFIED TYPE: ICD-10-CM

## 2020-07-27 PROCEDURE — 93000 ELECTROCARDIOGRAM COMPLETE: CPT | Performed by: INTERNAL MEDICINE

## 2020-07-27 PROCEDURE — 99215 OFFICE O/P EST HI 40 MIN: CPT | Performed by: INTERNAL MEDICINE

## 2020-07-27 RX ORDER — ASPIRIN 81 MG/1
81 TABLET, CHEWABLE ORAL DAILY
Qty: 90 TABLET | Refills: 1 | Status: SHIPPED | OUTPATIENT
Start: 2020-07-27

## 2020-08-11 ENCOUNTER — TELEPHONE (OUTPATIENT)
Dept: CARDIOLOGY CLINIC | Facility: CLINIC | Age: 50
End: 2020-08-11

## 2020-08-25 ENCOUNTER — HOSPITAL ENCOUNTER (OUTPATIENT)
Dept: RADIOLOGY | Facility: HOSPITAL | Age: 50
Discharge: HOME | End: 2020-08-25
Attending: INTERNAL MEDICINE
Payer: COMMERCIAL

## 2020-08-25 ENCOUNTER — HOSPITAL ENCOUNTER (OUTPATIENT)
Dept: NON INVASIVE DIAGNOSTICS | Facility: HOSPITAL | Age: 50
Discharge: HOME | End: 2020-08-25
Attending: INTERNAL MEDICINE
Payer: COMMERCIAL

## 2020-08-25 DIAGNOSIS — I47.1 PSVT (PAROXYSMAL SUPRAVENTRICULAR TACHYCARDIA) (HCC): ICD-10-CM

## 2020-08-25 DIAGNOSIS — E03.9 HYPOTHYROIDISM, UNSPECIFIED TYPE: ICD-10-CM

## 2020-08-25 DIAGNOSIS — R07.9 CHEST PAIN, UNSPECIFIED TYPE: ICD-10-CM

## 2020-08-25 DIAGNOSIS — R94.31 ABNORMAL EKG: ICD-10-CM

## 2020-08-25 DIAGNOSIS — R00.0 TACHYCARDIA: ICD-10-CM

## 2020-08-25 DIAGNOSIS — C81.90 HODGKIN LYMPHOMA, UNSPECIFIED HODGKIN LYMPHOMA TYPE, UNSPECIFIED BODY REGION (HCC): ICD-10-CM

## 2020-08-25 LAB
CHEST PAIN STATEMENT: NORMAL
MAX DIASTOLIC BP: 80 MMHG
MAX HEART RATE: 160 BPM
MAX PREDICTED HEART RATE: 170 BPM
MAX. SYSTOLIC BP: 154 MMHG
PROTOCOL NAME: NORMAL
REASON FOR TERMINATION: NORMAL
TARGET HR FORMULA: NORMAL
TIME IN EXERCISE PHASE: NORMAL

## 2020-08-25 PROCEDURE — 93306 TTE W/DOPPLER COMPLETE: CPT | Performed by: INTERNAL MEDICINE

## 2020-08-25 PROCEDURE — 78452 HT MUSCLE IMAGE SPECT MULT: CPT | Performed by: INTERNAL MEDICINE

## 2020-08-25 PROCEDURE — 93226 XTRNL ECG REC<48 HR SCAN A/R: CPT

## 2020-08-25 PROCEDURE — A9502 TC99M TETROFOSMIN: HCPCS

## 2020-08-25 PROCEDURE — 78452 HT MUSCLE IMAGE SPECT MULT: CPT

## 2020-08-25 PROCEDURE — 93017 CV STRESS TEST TRACING ONLY: CPT

## 2020-08-25 PROCEDURE — 93018 CV STRESS TEST I&R ONLY: CPT | Performed by: INTERNAL MEDICINE

## 2020-08-25 PROCEDURE — 93225 XTRNL ECG REC<48 HRS REC: CPT

## 2020-08-25 PROCEDURE — 93016 CV STRESS TEST SUPVJ ONLY: CPT | Performed by: INTERNAL MEDICINE

## 2020-08-25 PROCEDURE — 93306 TTE W/DOPPLER COMPLETE: CPT

## 2020-08-26 ENCOUNTER — TELEPHONE (OUTPATIENT)
Dept: CARDIOLOGY CLINIC | Facility: CLINIC | Age: 50
End: 2020-08-26

## 2020-08-26 PROBLEM — I42.0 DILATED CARDIOMYOPATHY (HCC): Status: ACTIVE | Noted: 2020-08-26

## 2020-08-26 NOTE — TELEPHONE ENCOUNTER
----- Message from Deion Jimenes MD sent at 8/25/2020  6:44 PM EDT -----  Patient has abnormal stress test as well as echo Doppler  She need to be seen earlier    And then we also make sure that she has seen by Dr Anne-Marie Jenkins, please make sure it happens
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
## 2020-08-26 NOTE — TELEPHONE ENCOUNTER
Brent Palomino,   I do not see clarification in his result note in regards to SVT     It looks like left ventricular systolic function was severely reduced, without distinct regional wall motion abnormalities on the stress test

## 2020-08-26 NOTE — TELEPHONE ENCOUNTER
[id number] Yoselyn Alfred, then maybe she needs an ICD  I will look at her testing  It's just so I can let scheduling knows what appointment is for  I will let them know  Thanks again

## 2020-08-26 NOTE — TELEPHONE ENCOUNTER
Thanks Yaya Villagran,    I will forward this to our Schedulers  She has seen Dr Roselia Mon in past and has history of SVT    Is this what was seen on stress test?

## 2020-08-26 NOTE — TELEPHONE ENCOUNTER
I spoke with patient, made aware of results  Can you please assist in scheduling, she needs to be seen  She's requesting soonest appointment  May need to move someone  Also, EP; can you please assist with scheduling patient, as per Dr Uli Andrew?

## 2020-08-29 LAB
ALBUMIN SERPL-MCNC: 4.9 G/DL (ref 3.8–4.8)
ALBUMIN/GLOB SERPL: 2.1 {RATIO} (ref 1.2–2.2)
ALP SERPL-CCNC: 53 IU/L (ref 39–117)
ALT SERPL-CCNC: 10 IU/L (ref 0–32)
AST SERPL-CCNC: 24 IU/L (ref 0–40)
BASOPHILS # BLD AUTO: 0 X10E3/UL (ref 0–0.2)
BASOPHILS NFR BLD AUTO: 1 %
BILIRUB SERPL-MCNC: 0.3 MG/DL (ref 0–1.2)
BUN SERPL-MCNC: 18 MG/DL (ref 6–24)
BUN/CREAT SERPL: 17 (ref 9–23)
CALCIUM SERPL-MCNC: 9.9 MG/DL (ref 8.7–10.2)
CHLORIDE SERPL-SCNC: 101 MMOL/L (ref 96–106)
CHOLEST SERPL-MCNC: 245 MG/DL (ref 100–199)
CHOLEST/HDLC SERPL: 2.3 RATIO (ref 0–4.4)
CO2 SERPL-SCNC: 27 MMOL/L (ref 20–29)
CREAT SERPL-MCNC: 1.06 MG/DL (ref 0.57–1)
EOSINOPHIL # BLD AUTO: 0.1 X10E3/UL (ref 0–0.4)
EOSINOPHIL NFR BLD AUTO: 4 %
ERYTHROCYTE [DISTWIDTH] IN BLOOD BY AUTOMATED COUNT: 12.3 % (ref 11.7–15.4)
GLOBULIN SER-MCNC: 2.3 G/DL (ref 1.5–4.5)
GLUCOSE SERPL-MCNC: 102 MG/DL (ref 65–99)
HCT VFR BLD AUTO: 44.1 % (ref 34–46.6)
HDLC SERPL-MCNC: 105 MG/DL
HGB BLD-MCNC: 15.3 G/DL (ref 11.1–15.9)
IMM GRANULOCYTES # BLD: 0 X10E3/UL (ref 0–0.1)
IMM GRANULOCYTES NFR BLD: 0 %
LDLC SERPL CALC-MCNC: 118 MG/DL (ref 0–99)
LYMPHOCYTES # BLD AUTO: 1.1 X10E3/UL (ref 0.7–3.1)
LYMPHOCYTES NFR BLD AUTO: 30 %
MCH RBC QN AUTO: 33 PG (ref 26.6–33)
MCHC RBC AUTO-ENTMCNC: 34.7 G/DL (ref 31.5–35.7)
MCV RBC AUTO: 95 FL (ref 79–97)
MONOCYTES # BLD AUTO: 0.4 X10E3/UL (ref 0.1–0.9)
MONOCYTES NFR BLD AUTO: 11 %
NEUTROPHILS # BLD AUTO: 2 X10E3/UL (ref 1.4–7)
NEUTROPHILS NFR BLD AUTO: 54 %
PLATELET # BLD AUTO: 210 X10E3/UL (ref 150–450)
POTASSIUM SERPL-SCNC: 4.7 MMOL/L (ref 3.5–5.2)
PROT SERPL-MCNC: 7.2 G/DL (ref 6–8.5)
RBC # BLD AUTO: 4.63 X10E6/UL (ref 3.77–5.28)
SL AMB EGFR AFRICAN AMERICAN: 71 ML/MIN/1.73
SL AMB EGFR NON AFRICAN AMERICAN: 61 ML/MIN/1.73
SL AMB VLDL CHOLESTEROL CALC: 22 MG/DL (ref 5–40)
SODIUM SERPL-SCNC: 141 MMOL/L (ref 134–144)
TRIGL SERPL-MCNC: 108 MG/DL (ref 0–149)
TSH SERPL DL<=0.005 MIU/L-ACNC: 15.1 UIU/ML (ref 0.45–4.5)
WBC # BLD AUTO: 3.6 X10E3/UL (ref 3.4–10.8)

## 2020-08-31 ENCOUNTER — OFFICE VISIT (OUTPATIENT)
Dept: CARDIOLOGY CLINIC | Facility: CLINIC | Age: 50
End: 2020-08-31
Payer: COMMERCIAL

## 2020-08-31 VITALS
DIASTOLIC BLOOD PRESSURE: 70 MMHG | SYSTOLIC BLOOD PRESSURE: 130 MMHG | OXYGEN SATURATION: 97 % | HEIGHT: 68 IN | BODY MASS INDEX: 19.25 KG/M2 | WEIGHT: 127 LBS | TEMPERATURE: 98.2 F | HEART RATE: 99 BPM

## 2020-08-31 DIAGNOSIS — I47.1 PSVT (PAROXYSMAL SUPRAVENTRICULAR TACHYCARDIA) (HCC): ICD-10-CM

## 2020-08-31 DIAGNOSIS — R94.31 ABNORMAL EKG: ICD-10-CM

## 2020-08-31 DIAGNOSIS — E03.9 HYPOTHYROIDISM, UNSPECIFIED TYPE: ICD-10-CM

## 2020-08-31 DIAGNOSIS — R00.0 TACHYCARDIA: ICD-10-CM

## 2020-08-31 DIAGNOSIS — I42.0 DILATED CARDIOMYOPATHY (HCC): ICD-10-CM

## 2020-08-31 DIAGNOSIS — C81.90 HODGKIN LYMPHOMA, UNSPECIFIED HODGKIN LYMPHOMA TYPE, UNSPECIFIED BODY REGION (HCC): ICD-10-CM

## 2020-08-31 PROCEDURE — 99214 OFFICE O/P EST MOD 30 MIN: CPT | Performed by: INTERNAL MEDICINE

## 2020-08-31 RX ORDER — SACUBITRIL AND VALSARTAN 24; 26 MG/1; MG/1
1 TABLET, FILM COATED ORAL 2 TIMES DAILY
Qty: 112 TABLET | Refills: 0 | Status: SHIPPED | COMMUNITY
Start: 2020-08-31 | End: 2020-10-13 | Stop reason: SDUPTHER

## 2020-08-31 RX ORDER — SACUBITRIL AND VALSARTAN 24; 26 MG/1; MG/1
1 TABLET, FILM COATED ORAL 2 TIMES DAILY
Qty: 60 TABLET | Refills: 1 | Status: SHIPPED | OUTPATIENT
Start: 2020-08-31 | End: 2020-08-31 | Stop reason: SDUPTHER

## 2020-09-04 ENCOUNTER — TELEPHONE (OUTPATIENT)
Dept: CARDIOLOGY CLINIC | Facility: CLINIC | Age: 50
End: 2020-09-04

## 2020-09-04 NOTE — TELEPHONE ENCOUNTER
Patient is confused on her medications: she said she is on 75 mg of metoprolol  She said she spoke to Dr Delfino Mcfarland and she said an angiogram needs to be ordered for her  Patient is confused and does not know what to do   She can be reached at [phone]

## 2020-09-04 NOTE — TELEPHONE ENCOUNTER
Patient states that Dr Kacy Caldwell cannot order the angiogram, it needs to be ordered by you, and done with our facility     Can you please assist?

## 2020-09-04 NOTE — TELEPHONE ENCOUNTER
When went to Dr Francia Bermudez; stating that she had different tachy than SVT,   Feels as though maybe not communicating EF at 30%  Metoprolol 50 BID per Dr Francia Bermudez;  he's recommending an angiogram  And a cardiac MRI    Are you opposing or in favor of?

## 2020-09-09 ENCOUNTER — DOCUMENTATION (OUTPATIENT)
Dept: CARDIOLOGY CLINIC | Facility: CLINIC | Age: 50
End: 2020-09-09

## 2020-09-09 DIAGNOSIS — R07.9 CHEST PAIN, UNSPECIFIED TYPE: ICD-10-CM

## 2020-09-09 DIAGNOSIS — I42.0 DILATED CARDIOMYOPATHY (HCC): Primary | ICD-10-CM

## 2020-09-09 DIAGNOSIS — R00.0 TACHYCARDIA: ICD-10-CM

## 2020-09-09 NOTE — PROGRESS NOTES
CALLED PATIENT TO ASK HER AVAILABILITY TO SCHEDULE HER CATH AT [facility] TO CALL [place]  CATH TO TAKE PLACE  AT: [address] Ana Ruiz 9/17 OR 9/18

## 2020-09-09 NOTE — TELEPHONE ENCOUNTER
SPOKE WITH PATIENT TODAY AND SHE HAS NO IDEA WHY SHE IS GETTING A CATH  PLEASE CALL HER TO DISCUSS PROCEDURE -[phone] BEFORE I SCHEDULE THIS

## 2020-09-09 NOTE — PROGRESS NOTES
Patient has been diagnosed with cardiomyopathy  EF is decreased  She has seen EP doctor  She had a previously ablation done  She will be scheduled for cardiac catheter in to rule out obstructive coronary artery disease  She will need COVID-19 testing before the catheterization    Catheterization to be done at [facility]

## 2020-09-10 DIAGNOSIS — I47.1 PSVT (PAROXYSMAL SUPRAVENTRICULAR TACHYCARDIA) (HCC): Primary | ICD-10-CM

## 2020-09-10 NOTE — TELEPHONE ENCOUNTER
Pt needs a new Rx for the new dose of Entresto to go to [facility] in [place]  She mentioned 75 mg  I think she ment Metoprolol 75 mg  She seemed confused

## 2020-09-10 NOTE — TELEPHONE ENCOUNTER
Pt has been scheduled for   [facility]  830am with Dr Mack fregoso to be done on 9/21   Patient will be by the office to  the order

## 2020-09-11 RX ORDER — METOPROLOL SUCCINATE 100 MG/1
100 TABLET, EXTENDED RELEASE ORAL DAILY
Qty: 90 TABLET | Refills: 3 | Status: SHIPPED | OUTPATIENT
Start: 2020-09-11 | End: 2021-09-23

## 2020-09-17 PROCEDURE — 93227 XTRNL ECG REC<48 HR R&I: CPT | Performed by: INTERNAL MEDICINE

## 2020-09-18 ENCOUNTER — TELEPHONE (OUTPATIENT)
Dept: CARDIOLOGY CLINIC | Facility: CLINIC | Age: 50
End: 2020-09-18

## 2020-09-18 ENCOUNTER — NURSE TRIAGE (OUTPATIENT)
Dept: OTHER | Facility: OTHER | Age: 50
End: 2020-09-18

## 2020-09-18 NOTE — TELEPHONE ENCOUNTER
Reason for Disposition   Information only question and nurse able to answer    Additional Information   Negative: Caller has already spoken with the PCP (or office), and has no further questions    Answer Assessment - Initial Assessment Questions  1  REASON FOR CALL or QUESTION: "What is your reason for calling today?" or "How can I best help you?" or "What question do you have that I can help answer?"      "I need a covid test before my procedure  I got the prescription in the mail and it says to go on 9/21 to the [facility] in [address] [place] [address] "    Unsure why pt contacted [facility]  Reviewed chart and orders  Advised pt that order is already in Epic for her and that she can go to the [facility] as she was instructed by Cardiology office on 9/21 between 8am-8pm   Advised her to wear a mask and call when she arrives in the parking lot      Protocols used: INFORMATION ONLY CALL - NO TRIAGE-ADULT-[place], NO CONTACT OR DUPLICATE CONTACT CALL-ADULT-[place]

## 2020-09-18 NOTE — RESULT ENCOUNTER NOTE
Patient's Holter was reviewed  She has elevated average heart rate with episodes of sinus tachycardia  No other significant tachy-maranda arrhythmias  Please call patient

## 2020-09-18 NOTE — TELEPHONE ENCOUNTER
Regarding: COVID Test Request  Asymptomatic  ----- Message from Jaida Thornton sent at 9/18/2020  1:35 PM EDT -----  "I am having surgery soon and need a test "

## 2020-09-24 ENCOUNTER — TRANSCRIBE ORDERS (OUTPATIENT)
Dept: ADMINISTRATIVE | Facility: HOSPITAL | Age: 50
End: 2020-09-24

## 2020-09-24 ENCOUNTER — APPOINTMENT (OUTPATIENT)
Dept: LAB | Facility: HOSPITAL | Age: 50
End: 2020-09-24
Attending: INTERNAL MEDICINE
Payer: COMMERCIAL

## 2020-09-24 ENCOUNTER — TELEPHONE (OUTPATIENT)
Dept: RADIOLOGY | Facility: HOSPITAL | Age: 50
End: 2020-09-24

## 2020-09-24 DIAGNOSIS — I42.0 DILATED CARDIOMYOPATHY (HCC): ICD-10-CM

## 2020-09-24 DIAGNOSIS — R00.0 TACHYCARDIA: ICD-10-CM

## 2020-09-24 DIAGNOSIS — R07.9 CHEST PAIN, UNSPECIFIED TYPE: ICD-10-CM

## 2020-09-24 DIAGNOSIS — Z01.818 OTHER SPECIFIED PRE-OPERATIVE EXAMINATION: Primary | ICD-10-CM

## 2020-09-24 LAB — SARS-COV-2 RNA RESP QL NAA+PROBE: NEGATIVE

## 2020-09-24 PROCEDURE — 87635 SARS-COV-2 COVID-19 AMP PRB: CPT

## 2020-09-25 ENCOUNTER — HOSPITAL ENCOUNTER (OUTPATIENT)
Dept: NON INVASIVE DIAGNOSTICS | Facility: HOSPITAL | Age: 50
Discharge: HOME | End: 2020-09-25
Attending: INTERNAL MEDICINE
Payer: COMMERCIAL

## 2020-09-25 VITALS
OXYGEN SATURATION: 99 % | DIASTOLIC BLOOD PRESSURE: 60 MMHG | TEMPERATURE: 96.5 F | HEART RATE: 71 BPM | RESPIRATION RATE: 18 BRPM | SYSTOLIC BLOOD PRESSURE: 100 MMHG

## 2020-09-25 DIAGNOSIS — R00.0 TACHYCARDIA: ICD-10-CM

## 2020-09-25 DIAGNOSIS — R07.9 CHEST PAIN, UNSPECIFIED TYPE: ICD-10-CM

## 2020-09-25 DIAGNOSIS — I42.0 DILATED CARDIOMYOPATHY (HCC): ICD-10-CM

## 2020-09-25 LAB
BASOPHILS # BLD AUTO: 0 X10E3/UL (ref 0–0.2)
BASOPHILS NFR BLD AUTO: 1 %
BUN SERPL-MCNC: 17 MG/DL (ref 6–24)
BUN/CREAT SERPL: 17 (ref 9–23)
CALCIUM SERPL-MCNC: 10.3 MG/DL (ref 8.7–10.2)
CHLORIDE SERPL-SCNC: 100 MMOL/L (ref 96–106)
CO2 SERPL-SCNC: 24 MMOL/L (ref 20–29)
CREAT SERPL-MCNC: 0.98 MG/DL (ref 0.57–1)
EOSINOPHIL # BLD AUTO: 0.1 X10E3/UL (ref 0–0.4)
EOSINOPHIL NFR BLD AUTO: 2 %
ERYTHROCYTE [DISTWIDTH] IN BLOOD BY AUTOMATED COUNT: 12.1 % (ref 11.7–15.4)
GLUCOSE SERPL-MCNC: 106 MG/DL (ref 65–99)
HCT VFR BLD AUTO: 41.7 % (ref 34–46.6)
HGB BLD-MCNC: 14.3 G/DL (ref 11.1–15.9)
IMM GRANULOCYTES # BLD: 0 X10E3/UL (ref 0–0.1)
IMM GRANULOCYTES NFR BLD: 0 %
INR PPP: 1 (ref 0.8–1.2)
LYMPHOCYTES # BLD AUTO: 0.8 X10E3/UL (ref 0.7–3.1)
LYMPHOCYTES NFR BLD AUTO: 21 %
MCH RBC QN AUTO: 32.4 PG (ref 26.6–33)
MCHC RBC AUTO-ENTMCNC: 34.3 G/DL (ref 31.5–35.7)
MCV RBC AUTO: 94 FL (ref 79–97)
MONOCYTES # BLD AUTO: 0.4 X10E3/UL (ref 0.1–0.9)
MONOCYTES NFR BLD AUTO: 10 %
NEUTROPHILS # BLD AUTO: 2.5 X10E3/UL (ref 1.4–7)
NEUTROPHILS NFR BLD AUTO: 66 %
PLATELET # BLD AUTO: 218 X10E3/UL (ref 150–450)
POTASSIUM SERPL-SCNC: 4.7 MMOL/L (ref 3.5–5.2)
PROTHROMBIN TIME: 10.4 SEC (ref 9.1–12)
RBC # BLD AUTO: 4.42 X10E6/UL (ref 3.77–5.28)
SL AMB EGFR AFRICAN AMERICAN: 78 ML/MIN/1.73
SL AMB EGFR NON AFRICAN AMERICAN: 67 ML/MIN/1.73
SODIUM SERPL-SCNC: 140 MMOL/L (ref 134–144)
WBC # BLD AUTO: 3.7 X10E3/UL (ref 3.4–10.8)

## 2020-09-25 PROCEDURE — C1894 INTRO/SHEATH, NON-LASER: HCPCS

## 2020-09-25 PROCEDURE — C1769 GUIDE WIRE: HCPCS

## 2020-09-25 PROCEDURE — 93458 L HRT ARTERY/VENTRICLE ANGIO: CPT

## 2020-09-25 PROCEDURE — 99152 MOD SED SAME PHYS/QHP 5/>YRS: CPT | Performed by: INTERNAL MEDICINE

## 2020-09-25 PROCEDURE — 93458 L HRT ARTERY/VENTRICLE ANGIO: CPT | Performed by: INTERNAL MEDICINE

## 2020-09-25 RX ORDER — VERAPAMIL HYDROCHLORIDE 2.5 MG/ML
INJECTION, SOLUTION INTRAVENOUS CODE/TRAUMA/SEDATION MEDICATION
Status: COMPLETED | OUTPATIENT
Start: 2020-09-25 | End: 2020-09-25

## 2020-09-25 RX ORDER — FENTANYL CITRATE 50 UG/ML
INJECTION, SOLUTION INTRAMUSCULAR; INTRAVENOUS CODE/TRAUMA/SEDATION MEDICATION
Status: COMPLETED | OUTPATIENT
Start: 2020-09-25 | End: 2020-09-25

## 2020-09-25 RX ORDER — SODIUM CHLORIDE 9 MG/ML
75 INJECTION, SOLUTION INTRAVENOUS CONTINUOUS
Status: CANCELLED | OUTPATIENT
Start: 2020-09-25 | End: 2020-09-25

## 2020-09-25 RX ORDER — NITROGLYCERIN 20 MG/100ML
INJECTION INTRAVENOUS CODE/TRAUMA/SEDATION MEDICATION
Status: COMPLETED | OUTPATIENT
Start: 2020-09-25 | End: 2020-09-25

## 2020-09-25 RX ORDER — MIDAZOLAM HYDROCHLORIDE 2 MG/2ML
INJECTION, SOLUTION INTRAMUSCULAR; INTRAVENOUS CODE/TRAUMA/SEDATION MEDICATION
Status: COMPLETED | OUTPATIENT
Start: 2020-09-25 | End: 2020-09-25

## 2020-09-25 RX ORDER — LIDOCAINE HYDROCHLORIDE 10 MG/ML
INJECTION, SOLUTION EPIDURAL; INFILTRATION; INTRACAUDAL; PERINEURAL CODE/TRAUMA/SEDATION MEDICATION
Status: COMPLETED | OUTPATIENT
Start: 2020-09-25 | End: 2020-09-25

## 2020-09-25 RX ORDER — HEPARIN SODIUM 1000 [USP'U]/ML
INJECTION, SOLUTION INTRAVENOUS; SUBCUTANEOUS CODE/TRAUMA/SEDATION MEDICATION
Status: COMPLETED | OUTPATIENT
Start: 2020-09-25 | End: 2020-09-25

## 2020-09-25 RX ADMIN — LIDOCAINE HYDROCHLORIDE 2 ML: 10 INJECTION, SOLUTION EPIDURAL; INFILTRATION; INTRACAUDAL; PERINEURAL at 09:12

## 2020-09-25 RX ADMIN — IOHEXOL 55 ML: 350 INJECTION, SOLUTION INTRAVENOUS at 09:53

## 2020-09-25 RX ADMIN — MIDAZOLAM HYDROCHLORIDE 2 MG: 1 INJECTION, SOLUTION INTRAMUSCULAR; INTRAVENOUS at 09:14

## 2020-09-25 RX ADMIN — Medication 200 MCG: at 09:14

## 2020-09-25 RX ADMIN — VERAPAMIL HYDROCHLORIDE 2.5 MG: 2.5 INJECTION, SOLUTION INTRAVENOUS at 09:15

## 2020-09-25 RX ADMIN — LIDOCAINE HYDROCHLORIDE 5 ML: 10 INJECTION, SOLUTION EPIDURAL; INFILTRATION; INTRACAUDAL; PERINEURAL at 09:10

## 2020-09-25 RX ADMIN — HEPARIN SODIUM 3000 UNITS: 1000 INJECTION INTRAVENOUS; SUBCUTANEOUS at 09:15

## 2020-09-25 RX ADMIN — FENTANYL CITRATE 100 MCG: 50 INJECTION, SOLUTION INTRAMUSCULAR; INTRAVENOUS at 09:14

## 2020-09-25 NOTE — PERIOPERATIVE NURSING NOTE
Radial band on right hand released, dry, sterile dressing applied  Right groin dressing dry, intact  Complete discharge instructions reviewed with patient

## 2020-09-25 NOTE — PROCEDURES
Cardiac Catheterization Operative Report    Dominick Yun  [id number]  9/25/2020  Livia Portillo MD    Procedure performed:    Right coronary angiography  Left coronary angiography  LV gram   Fluoroscopy    Indication:  Cardiomyopathy    Interventionist Cardiologist : Dr Ivan Fernandes MD [facility] [place]    Brief history:  Patient is 48-year-old woman with previous history of drug abuse, SVT status post ablation is diagnosed to have now cardiomyopathy and exertion shortness of breath  Due to recurrent symptoms of shortness of breath and cardiopathy she was scheduled for cardiac catheterization  Procedure Details: The risks, benefits, complications, including risk of stroke, heart attack, bleeding and even death but not limited to it discussed with the patient  Other treatment options, alternatives and expected outcomes were discussed with the patient and family  The patient and/or family concurred with the proposed plan, giving informed consent  Patient was brought to the cath lab after IV hydration was begun and oral premedication was given  Patient was further sedated with midazolam and fentanyl  Patient was prepped and draped in the usual manner  Using the modified Seldinger access technique, a 5 St Helenian sheath was placed in the right radial artery without any complications  Patient was given intra-arterial nitroglycerin and IV verapamil  IV Heparin was administered  A left heart catheterization was performed  Left and right coronary angiograms were  done  End of the procedure patient was transferred to holding area without any complications  Patient tolerated the procedure well  As patient has difficult venous access we also had a 4 St Helenian sheath placed the right groin which was removed at the end of the procedure without any complication  Access to right common femoral vein was obtained by micropuncture kit without any problem        After the procedure was completed, sedation was stopped and the sheaths and catheters were all removed  Hemostasis was achieved with vasc band as per protocol  Equipment used:     1  JR4 for right coronary angiography  2  Tig for left coronary angiography  3  LV gram  with JR4    Findings:  1  Dominance: Right dominant coronary system    2  Left main Coronary artery: Left main is a normal-size vessel  It bifurcates into large LAD and a nondominant circumflex system  Mild luminal irregularities noted  3  Left anterior descending artery: LAD is a large-size vessel and it has mild luminal irregularities causing about 20% stenosis  No other focal stenosis seen  4  Circumflex Coronary artery: Circumflex is a nondominant artery it has mild luminal regularities  No focal stenosis seen  5  Right coronary artery: RCA is a large dominant artery it has proximal around 25% stenosis  No focal stenosis seen mild luminal arteries noted    6  Left ventriculogram: LV gram done in GRANT view shows normal LV size with LV systolic function of about 45%  LVEDP was normal   There was no gradient across aortic valve  Estimated Blood Loss:  Minimal         Complications:  None; patient tolerated the procedure well  Impression: Cardiac catheterization shows patient has no significant obstructive disease  EF is around 45%  LVEDP was normal   As compared to previous studies EF has improved  Aortic calcification was noted    Recommendation: Continue medical therapy  Continue risk factor modification and continue beta-blocker  Disposition: Patient transferred to holding area/monitoring in hemodynamically stable condition  Condition: Stable      Dr Estella Martinez MD [facility] [place]        "This note has been constructed using a voice recognition system  Therefore there may be syntax, spelling, and/or grammatical errors   Please call if you have any questions  "

## 2020-09-25 NOTE — PERIOPERATIVE NURSING NOTE
Patient received from IR, placed on monitor  NSR showing   Right wrist sheath intact, fingers good circulation checks  1 ml of air released as ordered  Fluids and meal given, carin  Well

## 2020-09-25 NOTE — SEDATION DOCUMENTATION
Cardiac cath successfully completed  Initially unable to maintain peripheral IV access despite multiple attempts with U/S guidance  Dr Green Blizzard got right femoral venous access  Hemostasis achieved with 10 mins of manual pressure  Vascular band intact on right wrist  Stable for transfer back to [facility] for recovery   Report and care assumed by primary RN

## 2020-10-13 DIAGNOSIS — I42.0 DILATED CARDIOMYOPATHY (HCC): ICD-10-CM

## 2020-10-13 RX ORDER — SACUBITRIL AND VALSARTAN 24; 26 MG/1; MG/1
1 TABLET, FILM COATED ORAL 2 TIMES DAILY
Qty: 60 TABLET | Refills: 5 | Status: SHIPPED | OUTPATIENT
Start: 2020-10-13 | End: 2020-11-12 | Stop reason: SDUPTHER

## 2020-10-15 ENCOUNTER — TELEPHONE (OUTPATIENT)
Dept: CARDIOLOGY CLINIC | Facility: CLINIC | Age: 50
End: 2020-10-15

## 2020-10-28 ENCOUNTER — TELEPHONE (OUTPATIENT)
Dept: CARDIOLOGY CLINIC | Facility: CLINIC | Age: 50
End: 2020-10-28

## 2020-10-30 ENCOUNTER — TELEPHONE (OUTPATIENT)
Dept: CARDIOLOGY CLINIC | Facility: CLINIC | Age: 50
End: 2020-10-30

## 2020-11-08 PROBLEM — C81.90 HODGKIN'S DISEASE (HCC): Status: RESOLVED | Noted: 2019-12-02 | Resolved: 2020-11-08

## 2020-11-08 PROBLEM — R00.0 TACHYCARDIA: Status: RESOLVED | Noted: 2020-04-21 | Resolved: 2020-11-08

## 2020-11-12 ENCOUNTER — OFFICE VISIT (OUTPATIENT)
Dept: CARDIOLOGY CLINIC | Facility: CLINIC | Age: 50
End: 2020-11-12
Payer: COMMERCIAL

## 2020-11-12 VITALS
HEIGHT: 68 IN | WEIGHT: 127 LBS | OXYGEN SATURATION: 97 % | HEART RATE: 94 BPM | TEMPERATURE: 99.8 F | DIASTOLIC BLOOD PRESSURE: 64 MMHG | BODY MASS INDEX: 19.25 KG/M2 | SYSTOLIC BLOOD PRESSURE: 116 MMHG

## 2020-11-12 DIAGNOSIS — I50.42 CHRONIC COMBINED SYSTOLIC AND DIASTOLIC HEART FAILURE, NYHA CLASS 2 (HCC): ICD-10-CM

## 2020-11-12 DIAGNOSIS — I47.1 PSVT (PAROXYSMAL SUPRAVENTRICULAR TACHYCARDIA) (HCC): ICD-10-CM

## 2020-11-12 DIAGNOSIS — R94.31 ABNORMAL EKG: ICD-10-CM

## 2020-11-12 DIAGNOSIS — I42.0 DILATED CARDIOMYOPATHY (HCC): ICD-10-CM

## 2020-11-12 DIAGNOSIS — E03.9 HYPOTHYROIDISM, UNSPECIFIED TYPE: ICD-10-CM

## 2020-11-12 PROCEDURE — 99214 OFFICE O/P EST MOD 30 MIN: CPT | Performed by: INTERNAL MEDICINE

## 2020-11-12 PROCEDURE — 93000 ELECTROCARDIOGRAM COMPLETE: CPT | Performed by: INTERNAL MEDICINE

## 2020-11-12 RX ORDER — SACUBITRIL AND VALSARTAN 24; 26 MG/1; MG/1
1 TABLET, FILM COATED ORAL 2 TIMES DAILY
Qty: 60 TABLET | Refills: 5 | Status: SHIPPED | OUTPATIENT
Start: 2020-11-12 | End: 2020-12-11 | Stop reason: SDUPTHER

## 2020-12-11 DIAGNOSIS — I42.0 DILATED CARDIOMYOPATHY (HCC): ICD-10-CM

## 2020-12-11 DIAGNOSIS — I47.1 PSVT (PAROXYSMAL SUPRAVENTRICULAR TACHYCARDIA) (HCC): ICD-10-CM

## 2020-12-11 RX ORDER — SACUBITRIL AND VALSARTAN 24; 26 MG/1; MG/1
1 TABLET, FILM COATED ORAL 2 TIMES DAILY
Qty: 60 TABLET | Refills: 5 | Status: SHIPPED | OUTPATIENT
Start: 2020-12-11 | End: 2021-03-08

## 2020-12-14 ENCOUNTER — TELEPHONE (OUTPATIENT)
Dept: CARDIOLOGY CLINIC | Facility: CLINIC | Age: 50
End: 2020-12-14

## 2021-01-29 ENCOUNTER — HOSPITAL ENCOUNTER (OUTPATIENT)
Dept: NON INVASIVE DIAGNOSTICS | Facility: HOSPITAL | Age: 51
Discharge: HOME | End: 2021-01-29
Attending: INTERNAL MEDICINE
Payer: COMMERCIAL

## 2021-01-29 DIAGNOSIS — I42.0 DILATED CARDIOMYOPATHY (HCC): ICD-10-CM

## 2021-01-29 DIAGNOSIS — I50.42 CHRONIC COMBINED SYSTOLIC AND DIASTOLIC HEART FAILURE, NYHA CLASS 2 (HCC): ICD-10-CM

## 2021-01-29 DIAGNOSIS — R94.31 ABNORMAL EKG: ICD-10-CM

## 2021-01-29 DIAGNOSIS — I47.1 PSVT (PAROXYSMAL SUPRAVENTRICULAR TACHYCARDIA) (HCC): ICD-10-CM

## 2021-01-29 DIAGNOSIS — E03.9 HYPOTHYROIDISM, UNSPECIFIED TYPE: ICD-10-CM

## 2021-01-29 PROCEDURE — 93306 TTE W/DOPPLER COMPLETE: CPT

## 2021-01-29 PROCEDURE — 93306 TTE W/DOPPLER COMPLETE: CPT | Performed by: INTERNAL MEDICINE

## 2021-03-08 ENCOUNTER — OFFICE VISIT (OUTPATIENT)
Dept: CARDIOLOGY CLINIC | Facility: CLINIC | Age: 51
End: 2021-03-08
Payer: COMMERCIAL

## 2021-03-08 VITALS
WEIGHT: 133 LBS | OXYGEN SATURATION: 99 % | HEART RATE: 91 BPM | DIASTOLIC BLOOD PRESSURE: 72 MMHG | BODY MASS INDEX: 20.16 KG/M2 | TEMPERATURE: 97.5 F | HEIGHT: 68 IN | SYSTOLIC BLOOD PRESSURE: 138 MMHG

## 2021-03-08 DIAGNOSIS — I42.0 DILATED CARDIOMYOPATHY (HCC): ICD-10-CM

## 2021-03-08 DIAGNOSIS — R94.31 ABNORMAL EKG: ICD-10-CM

## 2021-03-08 DIAGNOSIS — E03.9 HYPOTHYROIDISM, UNSPECIFIED TYPE: ICD-10-CM

## 2021-03-08 DIAGNOSIS — I47.1 PSVT (PAROXYSMAL SUPRAVENTRICULAR TACHYCARDIA) (HCC): ICD-10-CM

## 2021-03-08 DIAGNOSIS — R07.9 CHEST PAIN, UNSPECIFIED TYPE: ICD-10-CM

## 2021-03-08 PROCEDURE — 93000 ELECTROCARDIOGRAM COMPLETE: CPT | Performed by: INTERNAL MEDICINE

## 2021-03-08 PROCEDURE — 99214 OFFICE O/P EST MOD 30 MIN: CPT | Performed by: INTERNAL MEDICINE

## 2021-03-08 RX ORDER — LOSARTAN POTASSIUM 25 MG/1
25 TABLET ORAL DAILY
Qty: 90 TABLET | Refills: 1 | Status: SHIPPED | OUTPATIENT
Start: 2021-03-08 | End: 2021-09-23
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
## 2021-09-07 ENCOUNTER — TELEPHONE (OUTPATIENT)
Dept: CARDIOLOGY CLINIC | Facility: CLINIC | Age: 51
End: 2021-09-07

## 2021-09-07 NOTE — TELEPHONE ENCOUNTER
Patient called today to report ringing in ears, and dizziness  No vitals to report  No chest pain, no sob  She did report a "trudging" feeling in her chest    I advised her to reach out to primary care due to you not being in office today, however, she has an appointment to see us in October  If necessary we can move it up

## 2021-09-17 ENCOUNTER — CONSULT (OUTPATIENT)
Dept: ENDOCRINOLOGY | Facility: CLINIC | Age: 51
End: 2021-09-17
Payer: COMMERCIAL

## 2021-09-17 VITALS
HEIGHT: 68 IN | BODY MASS INDEX: 18.49 KG/M2 | TEMPERATURE: 97 F | SYSTOLIC BLOOD PRESSURE: 130 MMHG | HEART RATE: 82 BPM | DIASTOLIC BLOOD PRESSURE: 80 MMHG | WEIGHT: 122 LBS

## 2021-09-17 DIAGNOSIS — Z83.3 FAMILY HISTORY OF DIABETES MELLITUS: ICD-10-CM

## 2021-09-17 DIAGNOSIS — R19.7 DIARRHEA, UNSPECIFIED TYPE: ICD-10-CM

## 2021-09-17 DIAGNOSIS — E03.9 HYPOTHYROIDISM, UNSPECIFIED TYPE: Primary | ICD-10-CM

## 2021-09-17 DIAGNOSIS — E83.52 HYPERCALCEMIA: ICD-10-CM

## 2021-09-17 DIAGNOSIS — R63.4 WEIGHT LOSS: ICD-10-CM

## 2021-09-17 DIAGNOSIS — R53.83 OTHER FATIGUE: ICD-10-CM

## 2021-09-17 PROCEDURE — 99205 OFFICE O/P NEW HI 60 MIN: CPT | Performed by: INTERNAL MEDICINE
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
## 2021-09-17 NOTE — PATIENT INSTRUCTIONS
Please get labs done as ordered   For now continue levothyroxine 50 mcg orally daily   Will decide further plan of management based on results

## 2021-09-23 DIAGNOSIS — I42.0 DILATED CARDIOMYOPATHY (HCC): ICD-10-CM

## 2021-09-23 DIAGNOSIS — I47.1 PSVT (PAROXYSMAL SUPRAVENTRICULAR TACHYCARDIA) (HCC): ICD-10-CM

## 2021-09-23 RX ORDER — LOSARTAN POTASSIUM 25 MG/1
TABLET ORAL
Qty: 90 TABLET | Refills: 1 | Status: SHIPPED | OUTPATIENT
Start: 2021-09-23 | End: 2021-10-04 | Stop reason: SDUPTHER

## 2021-09-23 RX ORDER — METOPROLOL SUCCINATE 100 MG/1
TABLET, EXTENDED RELEASE ORAL
Qty: 90 TABLET | Refills: 3 | Status: SHIPPED | OUTPATIENT
Start: 2021-09-23

## 2021-10-04 ENCOUNTER — OFFICE VISIT (OUTPATIENT)
Dept: CARDIOLOGY CLINIC | Facility: CLINIC | Age: 51
End: 2021-10-04
Payer: COMMERCIAL

## 2021-10-04 VITALS
WEIGHT: 124 LBS | TEMPERATURE: 97.9 F | SYSTOLIC BLOOD PRESSURE: 120 MMHG | HEART RATE: 89 BPM | DIASTOLIC BLOOD PRESSURE: 80 MMHG | OXYGEN SATURATION: 98 % | HEIGHT: 68 IN | BODY MASS INDEX: 18.79 KG/M2

## 2021-10-04 DIAGNOSIS — I50.42 CHRONIC COMBINED SYSTOLIC AND DIASTOLIC HEART FAILURE, NYHA CLASS 2 (HCC): ICD-10-CM

## 2021-10-04 DIAGNOSIS — I47.1 PSVT (PAROXYSMAL SUPRAVENTRICULAR TACHYCARDIA) (HCC): ICD-10-CM

## 2021-10-04 DIAGNOSIS — E78.5 DYSLIPIDEMIA: ICD-10-CM

## 2021-10-04 DIAGNOSIS — I42.0 DILATED CARDIOMYOPATHY (HCC): ICD-10-CM

## 2021-10-04 DIAGNOSIS — E03.9 HYPOTHYROIDISM, UNSPECIFIED TYPE: ICD-10-CM

## 2021-10-04 DIAGNOSIS — R00.0 TACHYCARDIA: ICD-10-CM

## 2021-10-04 DIAGNOSIS — R06.02 SHORTNESS OF BREATH: ICD-10-CM

## 2021-10-04 DIAGNOSIS — C81.90 HODGKIN LYMPHOMA, UNSPECIFIED HODGKIN LYMPHOMA TYPE, UNSPECIFIED BODY REGION (HCC): ICD-10-CM

## 2021-10-04 PROCEDURE — 99214 OFFICE O/P EST MOD 30 MIN: CPT | Performed by: INTERNAL MEDICINE

## 2021-10-04 PROCEDURE — 93000 ELECTROCARDIOGRAM COMPLETE: CPT | Performed by: INTERNAL MEDICINE

## 2021-10-04 RX ORDER — METOPROLOL SUCCINATE 25 MG/1
25 TABLET, EXTENDED RELEASE ORAL DAILY
Qty: 90 TABLET | Refills: 1 | Status: SHIPPED | OUTPATIENT
Start: 2021-10-04 | End: 2022-04-19

## 2021-10-04 RX ORDER — ROSUVASTATIN CALCIUM 5 MG/1
5 TABLET, COATED ORAL EVERY OTHER DAY
Qty: 45 TABLET | Refills: 1 | Status: SHIPPED | OUTPATIENT
Start: 2021-10-04 | End: 2022-03-28

## 2021-10-04 RX ORDER — LOSARTAN POTASSIUM 50 MG/1
50 TABLET ORAL DAILY
Qty: 90 TABLET | Refills: 1 | Status: SHIPPED | OUTPATIENT
Start: 2021-10-04 | End: 2022-05-04

## 2021-10-05 ENCOUNTER — TELEPHONE (OUTPATIENT)
Dept: CARDIOLOGY CLINIC | Facility: CLINIC | Age: 51
End: 2021-10-05

## 2021-10-05 DIAGNOSIS — I50.42 CHRONIC COMBINED SYSTOLIC AND DIASTOLIC HEART FAILURE, NYHA CLASS 2 (HCC): Primary | ICD-10-CM

## 2021-10-05 DIAGNOSIS — I42.0 DILATED CARDIOMYOPATHY (HCC): ICD-10-CM

## 2021-10-26 ENCOUNTER — CLINICAL SUPPORT (OUTPATIENT)
Dept: CARDIAC REHAB | Facility: CLINIC | Age: 51
End: 2021-10-26
Payer: COMMERCIAL

## 2021-10-26 DIAGNOSIS — I50.42 CHRONIC COMBINED SYSTOLIC AND DIASTOLIC HEART FAILURE, NYHA CLASS 2 (HCC): ICD-10-CM

## 2021-10-26 DIAGNOSIS — I42.0 DILATED CARDIOMYOPATHY (HCC): ICD-10-CM

## 2021-10-26 PROCEDURE — 93797 PHYS/QHP OP CAR RHAB WO ECG: CPT

## 2021-10-29 ENCOUNTER — CLINICAL SUPPORT (OUTPATIENT)
Dept: CARDIAC REHAB | Facility: CLINIC | Age: 51
End: 2021-10-29
Payer: COMMERCIAL

## 2021-10-29 DIAGNOSIS — I50.42 CHRONIC COMBINED SYSTOLIC AND DIASTOLIC HEART FAILURE (HCC): ICD-10-CM

## 2021-10-29 PROCEDURE — 93798 PHYS/QHP OP CAR RHAB W/ECG: CPT

## 2021-11-01 ENCOUNTER — CLINICAL SUPPORT (OUTPATIENT)
Dept: CARDIAC REHAB | Facility: CLINIC | Age: 51
End: 2021-11-01
Payer: COMMERCIAL

## 2021-11-01 DIAGNOSIS — I50.42 CHRONIC COMBINED SYSTOLIC AND DIASTOLIC HEART FAILURE (HCC): ICD-10-CM

## 2021-11-01 PROCEDURE — 93798 PHYS/QHP OP CAR RHAB W/ECG: CPT

## 2021-11-03 ENCOUNTER — CLINICAL SUPPORT (OUTPATIENT)
Dept: CARDIAC REHAB | Facility: CLINIC | Age: 51
End: 2021-11-03
Payer: COMMERCIAL

## 2021-11-03 DIAGNOSIS — I50.42 CHRONIC COMBINED SYSTOLIC AND DIASTOLIC HEART FAILURE (HCC): ICD-10-CM

## 2021-11-03 PROCEDURE — 93798 PHYS/QHP OP CAR RHAB W/ECG: CPT

## 2021-11-05 ENCOUNTER — CLINICAL SUPPORT (OUTPATIENT)
Dept: CARDIAC REHAB | Facility: CLINIC | Age: 51
End: 2021-11-05
Payer: COMMERCIAL

## 2021-11-05 DIAGNOSIS — I50.42 CHRONIC COMBINED SYSTOLIC AND DIASTOLIC HEART FAILURE (HCC): ICD-10-CM

## 2021-11-05 PROCEDURE — 93798 PHYS/QHP OP CAR RHAB W/ECG: CPT

## 2021-11-08 ENCOUNTER — CLINICAL SUPPORT (OUTPATIENT)
Dept: CARDIAC REHAB | Facility: CLINIC | Age: 51
End: 2021-11-08
Payer: COMMERCIAL

## 2021-11-08 DIAGNOSIS — I50.42 CHRONIC COMBINED SYSTOLIC AND DIASTOLIC HEART FAILURE (HCC): ICD-10-CM

## 2021-11-08 PROCEDURE — 93798 PHYS/QHP OP CAR RHAB W/ECG: CPT

## 2021-11-10 ENCOUNTER — CLINICAL SUPPORT (OUTPATIENT)
Dept: CARDIAC REHAB | Facility: CLINIC | Age: 51
End: 2021-11-10
Payer: COMMERCIAL

## 2021-11-10 DIAGNOSIS — I50.42 CHRONIC COMBINED SYSTOLIC AND DIASTOLIC CHF (CONGESTIVE HEART FAILURE) (HCC): ICD-10-CM

## 2021-11-10 PROCEDURE — 93798 PHYS/QHP OP CAR RHAB W/ECG: CPT

## 2021-11-12 ENCOUNTER — CLINICAL SUPPORT (OUTPATIENT)
Dept: CARDIAC REHAB | Facility: CLINIC | Age: 51
End: 2021-11-12
Payer: COMMERCIAL

## 2021-11-12 DIAGNOSIS — I50.42 CHRONIC COMBINED SYSTOLIC AND DIASTOLIC HEART FAILURE (HCC): ICD-10-CM

## 2021-11-12 PROCEDURE — 93798 PHYS/QHP OP CAR RHAB W/ECG: CPT

## 2021-11-15 ENCOUNTER — CLINICAL SUPPORT (OUTPATIENT)
Dept: CARDIAC REHAB | Facility: CLINIC | Age: 51
End: 2021-11-15
Payer: COMMERCIAL

## 2021-11-15 DIAGNOSIS — I50.42 CHRONIC COMBINED SYSTOLIC AND DIASTOLIC HEART FAILURE (HCC): ICD-10-CM

## 2021-11-15 PROCEDURE — 93798 PHYS/QHP OP CAR RHAB W/ECG: CPT

## 2021-11-17 ENCOUNTER — CLINICAL SUPPORT (OUTPATIENT)
Dept: CARDIAC REHAB | Facility: CLINIC | Age: 51
End: 2021-11-17
Payer: COMMERCIAL

## 2021-11-17 DIAGNOSIS — I50.42 CHRONIC COMBINED SYSTOLIC AND DIASTOLIC HEART FAILURE (HCC): ICD-10-CM

## 2021-11-17 PROCEDURE — 93798 PHYS/QHP OP CAR RHAB W/ECG: CPT

## 2021-11-19 ENCOUNTER — CLINICAL SUPPORT (OUTPATIENT)
Dept: CARDIAC REHAB | Facility: CLINIC | Age: 51
End: 2021-11-19
Payer: COMMERCIAL

## 2021-11-19 DIAGNOSIS — I50.42 CHRONIC COMBINED SYSTOLIC AND DIASTOLIC HEART FAILURE (HCC): ICD-10-CM

## 2021-11-19 PROCEDURE — 93798 PHYS/QHP OP CAR RHAB W/ECG: CPT

## 2021-11-22 ENCOUNTER — APPOINTMENT (OUTPATIENT)
Dept: CARDIAC REHAB | Facility: CLINIC | Age: 51
End: 2021-11-22
Payer: COMMERCIAL

## 2021-11-23 ENCOUNTER — CLINICAL SUPPORT (OUTPATIENT)
Dept: CARDIAC REHAB | Facility: CLINIC | Age: 51
End: 2021-11-23
Payer: COMMERCIAL

## 2021-11-23 DIAGNOSIS — I50.42 CHRONIC COMBINED SYSTOLIC AND DIASTOLIC HEART FAILURE (HCC): ICD-10-CM

## 2021-11-23 PROCEDURE — 93798 PHYS/QHP OP CAR RHAB W/ECG: CPT

## 2021-11-26 ENCOUNTER — APPOINTMENT (OUTPATIENT)
Dept: CARDIAC REHAB | Facility: CLINIC | Age: 51
End: 2021-11-26
Payer: COMMERCIAL

## 2021-11-29 ENCOUNTER — CLINICAL SUPPORT (OUTPATIENT)
Dept: CARDIAC REHAB | Facility: CLINIC | Age: 51
End: 2021-11-29
Payer: COMMERCIAL

## 2021-11-29 DIAGNOSIS — I50.42 CHRONIC COMBINED SYSTOLIC AND DIASTOLIC CHF (CONGESTIVE HEART FAILURE) (HCC): ICD-10-CM

## 2021-11-29 PROCEDURE — 93798 PHYS/QHP OP CAR RHAB W/ECG: CPT

## 2021-12-01 ENCOUNTER — CLINICAL SUPPORT (OUTPATIENT)
Dept: CARDIAC REHAB | Facility: CLINIC | Age: 51
End: 2021-12-01
Payer: COMMERCIAL

## 2021-12-01 DIAGNOSIS — I50.42 CHRONIC COMBINED SYSTOLIC (CONGESTIVE) AND DIASTOLIC (CONGESTIVE) HEART FAILURE (HCC): ICD-10-CM

## 2021-12-01 PROCEDURE — 93798 PHYS/QHP OP CAR RHAB W/ECG: CPT

## 2021-12-03 ENCOUNTER — APPOINTMENT (OUTPATIENT)
Dept: CARDIAC REHAB | Facility: CLINIC | Age: 51
End: 2021-12-03
Payer: COMMERCIAL

## 2021-12-06 ENCOUNTER — CLINICAL SUPPORT (OUTPATIENT)
Dept: CARDIAC REHAB | Facility: CLINIC | Age: 51
End: 2021-12-06
Payer: COMMERCIAL

## 2021-12-06 DIAGNOSIS — I50.42 CHRONIC COMBINED SYSTOLIC AND DIASTOLIC HEART FAILURE (HCC): ICD-10-CM

## 2021-12-06 PROCEDURE — 93798 PHYS/QHP OP CAR RHAB W/ECG: CPT

## 2021-12-08 ENCOUNTER — CLINICAL SUPPORT (OUTPATIENT)
Dept: CARDIAC REHAB | Facility: CLINIC | Age: 51
End: 2021-12-08
Payer: COMMERCIAL

## 2021-12-08 DIAGNOSIS — I42.0 DILATED CARDIOMYOPATHY (HCC): ICD-10-CM

## 2021-12-08 PROCEDURE — 93798 PHYS/QHP OP CAR RHAB W/ECG: CPT

## 2021-12-10 ENCOUNTER — CLINICAL SUPPORT (OUTPATIENT)
Dept: CARDIAC REHAB | Facility: CLINIC | Age: 51
End: 2021-12-10
Payer: COMMERCIAL

## 2021-12-10 DIAGNOSIS — I50.42 CHRONIC COMBINED SYSTOLIC AND DIASTOLIC HEART FAILURE (HCC): ICD-10-CM

## 2021-12-10 PROCEDURE — 93798 PHYS/QHP OP CAR RHAB W/ECG: CPT

## 2021-12-13 ENCOUNTER — CLINICAL SUPPORT (OUTPATIENT)
Dept: CARDIAC REHAB | Facility: CLINIC | Age: 51
End: 2021-12-13
Payer: COMMERCIAL

## 2021-12-13 DIAGNOSIS — I50.42 CHRONIC COMBINED SYSTOLIC AND DIASTOLIC HEART FAILURE (HCC): ICD-10-CM

## 2021-12-13 PROCEDURE — 93798 PHYS/QHP OP CAR RHAB W/ECG: CPT

## 2021-12-15 ENCOUNTER — APPOINTMENT (OUTPATIENT)
Dept: CARDIAC REHAB | Facility: CLINIC | Age: 51
End: 2021-12-15
Payer: COMMERCIAL

## 2021-12-17 ENCOUNTER — CLINICAL SUPPORT (OUTPATIENT)
Dept: CARDIAC REHAB | Facility: CLINIC | Age: 51
End: 2021-12-17
Payer: COMMERCIAL

## 2021-12-17 DIAGNOSIS — I50.42 CHRONIC COMBINED SYSTOLIC AND DIASTOLIC HEART FAILURE (HCC): ICD-10-CM

## 2021-12-17 PROCEDURE — 93798 PHYS/QHP OP CAR RHAB W/ECG: CPT

## 2021-12-20 ENCOUNTER — APPOINTMENT (OUTPATIENT)
Dept: CARDIAC REHAB | Facility: CLINIC | Age: 51
End: 2021-12-20
Payer: COMMERCIAL

## 2021-12-22 ENCOUNTER — APPOINTMENT (OUTPATIENT)
Dept: CARDIAC REHAB | Facility: CLINIC | Age: 51
End: 2021-12-22
Payer: COMMERCIAL

## 2021-12-24 ENCOUNTER — APPOINTMENT (OUTPATIENT)
Dept: CARDIAC REHAB | Facility: CLINIC | Age: 51
End: 2021-12-24
Payer: COMMERCIAL

## 2021-12-27 ENCOUNTER — CLINICAL SUPPORT (OUTPATIENT)
Dept: CARDIAC REHAB | Facility: CLINIC | Age: 51
End: 2021-12-27
Payer: COMMERCIAL

## 2021-12-27 DIAGNOSIS — I50.42 CHRONIC COMBINED SYSTOLIC AND DIASTOLIC HEART FAILURE (HCC): ICD-10-CM

## 2021-12-27 PROCEDURE — 93798 PHYS/QHP OP CAR RHAB W/ECG: CPT

## 2021-12-29 ENCOUNTER — APPOINTMENT (OUTPATIENT)
Dept: CARDIAC REHAB | Facility: CLINIC | Age: 51
End: 2021-12-29
Payer: COMMERCIAL

## 2021-12-31 ENCOUNTER — APPOINTMENT (OUTPATIENT)
Dept: CARDIAC REHAB | Facility: CLINIC | Age: 51
End: 2021-12-31
Payer: COMMERCIAL

## 2022-01-03 ENCOUNTER — CLINICAL SUPPORT (OUTPATIENT)
Dept: CARDIAC REHAB | Facility: CLINIC | Age: 52
End: 2022-01-03
Payer: COMMERCIAL

## 2022-01-03 DIAGNOSIS — I50.42 CHRONIC COMBINED SYSTOLIC AND DIASTOLIC HEART FAILURE (HCC): ICD-10-CM

## 2022-01-03 PROCEDURE — 93798 PHYS/QHP OP CAR RHAB W/ECG: CPT

## 2022-01-05 ENCOUNTER — CLINICAL SUPPORT (OUTPATIENT)
Dept: CARDIAC REHAB | Facility: CLINIC | Age: 52
End: 2022-01-05
Payer: COMMERCIAL

## 2022-01-05 DIAGNOSIS — I50.42 CHRONIC COMBINED SYSTOLIC AND DIASTOLIC HEART FAILURE (HCC): ICD-10-CM

## 2022-01-05 PROCEDURE — 93798 PHYS/QHP OP CAR RHAB W/ECG: CPT

## 2022-01-07 ENCOUNTER — APPOINTMENT (OUTPATIENT)
Dept: CARDIAC REHAB | Facility: CLINIC | Age: 52
End: 2022-01-07
Payer: COMMERCIAL

## 2022-01-10 ENCOUNTER — CLINICAL SUPPORT (OUTPATIENT)
Dept: CARDIAC REHAB | Facility: CLINIC | Age: 52
End: 2022-01-10
Payer: COMMERCIAL

## 2022-01-10 DIAGNOSIS — I50.42 CHRONIC COMBINED SYSTOLIC AND DIASTOLIC HEART FAILURE (HCC): ICD-10-CM

## 2022-01-10 PROCEDURE — 93798 PHYS/QHP OP CAR RHAB W/ECG: CPT

## 2022-01-12 ENCOUNTER — CLINICAL SUPPORT (OUTPATIENT)
Dept: CARDIAC REHAB | Facility: CLINIC | Age: 52
End: 2022-01-12
Payer: COMMERCIAL

## 2022-01-12 DIAGNOSIS — I50.42 CHRONIC COMBINED SYSTOLIC AND DIASTOLIC HEART FAILURE (HCC): ICD-10-CM

## 2022-01-12 PROCEDURE — 93798 PHYS/QHP OP CAR RHAB W/ECG: CPT

## 2022-01-14 ENCOUNTER — CLINICAL SUPPORT (OUTPATIENT)
Dept: CARDIAC REHAB | Facility: CLINIC | Age: 52
End: 2022-01-14
Payer: COMMERCIAL

## 2022-01-14 DIAGNOSIS — I50.22 CHRONIC SYSTOLIC HEART FAILURE (HCC): ICD-10-CM

## 2022-01-14 PROCEDURE — 93798 PHYS/QHP OP CAR RHAB W/ECG: CPT

## 2022-01-17 ENCOUNTER — APPOINTMENT (OUTPATIENT)
Dept: CARDIAC REHAB | Facility: CLINIC | Age: 52
End: 2022-01-17
Payer: COMMERCIAL

## 2022-01-17 NOTE — PROGRESS NOTES
Cardiac Rehabilitation Plan of Care   90 Day Reassessment          Today's date: 2022   # of Exercise Sessions Completed: 25  Patient name: Belkis Sargent      : 1970  Age: 45 y o  MRN: [id number]  Referring Physician: Siobhan Renee MD  Cardiologist: Siobhan Renee  Provider: [place]  Clinician: Alcira Marino RN    Dx:   Encounter Diagnosis   Name Primary?  Chronic systolic heart failure ([address] )      Date of onset: 10/5/2021      SUMMARY OF PROGRESS:  Corie Santos completed 25 sessions of the cardiac rehabilitation program  Patient stated she ceased smoking the  cigarettes a day  She stated she quit on 2021  Telemetry monitor NSR with PVC at rest and with exercise  She completes 39 minutes of cardiovascular exercise with light weight strength training program  Her exercise MET level increased from 2 8/4 o to 3 4/5 1 MET's  RPE 3-5  Tolerated exercise well  Denied any complaint of chest discomfort  She stated she has fatigue and shortness of breath often with minimal exertion at home  Resting BP's 102//70  Exercise BP's 114//74  Resting HR's   Her main goals are to increase strength and endurance to return to walking outdoors 25 minutes a day, return to weight training and an exercise class  For home exercise, she states that she walks her dog daily for 20-25 mins  She stated she started doing push-ups at home and felt slight dizziness and palpitations post workout  Encouraged her to caution holding breath during exertion  Encouraged hydration and proper breathing techniques to conserve energy  She would also try to improve her PHQ9 score of 13 and GAD7 score of 11  On  PHQ9 improved from 8 to 5 score, GAD7 score improved from 9 to 8 score  Will repeat PHQ9/GAD7 at next visit  Provided her with contact information for behavioral health services and [facility]  She downloaded [employer] but has had trouble accessing it   She has been watching meditation videos on YouTube  She's gained 4 5 lbs since the start of the program  She stated her primary care MD is aware  Provided her with handbook for cardiac rehabilitation  Will continue to monitor  Medication compliance: Yes   Comments: Pt reports to be compliant with medications  Fall Risk: Low   Comments: Ambulates with a steady gait with no assist device and Denies a fall in the past 6 months    EKG Interpretation: NSR with PVC      EXERCISE ASSESSMENT and PLAN    Current Exercise Program in Rehab:       Frequency: 3 days/week       Minutes: 30-43         METS: 3 4-5 1          HR: 112-130   RPE: 3-5         Modalities: Treadmill, UBE, Lifecycle and Recumbent bike      Exercise Progression 30 Day Goals :    Frequency: 3 days/week of cardiac rehab     Supplement with home exercise 2+ days/wk as tolerated    Minutes: 40-45                            >150 mins/wk of moderate intensity exercise   METS: 3 4-6 0   HR: 20 to 30 bpm above resting    RPE: 4-6   Modalities: Treadmill, UBE, Lifecycle and Recumbent bike    Strength trainin-3 days / week  12-15 repetitions  1-2 sets per modality   Will be added following 2-3 weeks of monitored exercise sessions   Modalities: Lateral Raise, Arm Curl, Upright Rows, Front Raises and Shoulder Shrugs    Home Exercise: Type: outdoor walking daily for 20-25 mins    Goals: Attend Rehab regularly, Start a walking program and increase endurance to do an exercise class and weight training    Progression Toward Goals:  Reviewed Pt goals and determined plan of care, Will continue to educate and progress as tolerated      Education: benefit of exercise for CAD risk factors, home exercise guidelines, AHA guidelines to achieve >150 mins/wk of moderate exercise, RPE scale and class: Risk Factors for Heart Disease   Plan:home exercise 30+ mins 2 days opposite CR  Readiness to change: Action:  (Changing behavior)      NUTRITION ASSESSMENT AND PLAN    Weight control:    Starting weight: 123 5   Current weight:   128  Waist circumference:    Startin 5   Current:      Diabetes: N/A  A1c: 5 6    last measured: 10/25/2019    Lipid management: Discussed diet and lipid management and Last lipid profile 2020  Chol 245          Goals:caution sodium in diet and continue consuming a heart healthy diet    Progression Toward Goals: Reviewed Pt goals and determined plan of care, Will continue to educate and progress as tolerated  Education: heart healthy eating  low sodium diet  hydration  education class: Heart Healthy Eating  education class:  Label Reading  Plan: Education class: Reading Food Labels, Education Class: Heart Healthy Eating and avoid processed foods  Readiness to change: Action:  (Changing behavior)      PSYCHOSOCIAL ASSESSMENT AND PLAN    Emotional:  Depression assessment:  PHQ-9 = 5-9 = Mild Depression            Anxiety measure:  RANCHO-7 = 5-9 = Mild anxiety  Self-reported stress level:  4  Social support: Fair    Goals:  Reduce perceived stress to 1-3/10, PHQ-9 - reduced severity by one level, contact behavioral health , increased energy and down load [facility]    Progression Toward Goals: Reviewed Pt goals and determined plan of care, Will continue to educate and progress as tolerated      Education: signs/sxs of depression, benefits of a positive support system, stress management techniques, depression and CAD, benefits of mental health counseling, class:  Stress and Your Health  and class:  Relaxation  Plan: Refer to behavioral health/counseling, PHQ-9 >5 will refer to MD, Refer to [employer], Practice relaxation techniques, Exercise, Spend time outdoors, Read a Book, Keep a positive mindset and Improve relationship with father  Readiness to change: Contemplation:  (Acknowledging that there is a problem but not yet ready or sure of wanting to make a change)      OTHER CORE COMPONENTS     Tobacco:   Social History     Tobacco Use   Smoking Status Current Every Day Smoker    Types: Cigarettes    Last attempt to quit: 2020    Years since quittin 9   Smokeless Tobacco Never Used   Tobacco Comment    2 ciggs a day       Tobacco Use Intervention:   Brief counseling by cardiac rehab professional  Date: 10/26/2021  [place] Quit Line 1-800-QUIT-NOW  Pt continues to smoke 2 a day    Pt is ready to quit    Anginal Symptoms:  excessive fatigue   NTG use: No prescription    Blood pressure:    Restin//70   Exercise: 840//26    Goals: reduced dietary sodium <2300mg, medication compliance and Abstain from smoking    Progression Toward Goals: Reviewed Pt goals and determined plan of care, Will continue to educate and progress as tolerated      Education:  Education class:  Common Heart Medications, Education class: Understanding Heart Disease and smoking cessation  Plan: call free Quitline - 1-800-QUIT-NOW (153-2377), Set target quit date for smoking, reduce number of cigarettes per day, Complete abstention from smoking, Avoid Processed foods, engage in regular exercise and check labels for sodium content  Readiness to change: Action:  (Changing behavior)

## 2022-01-19 ENCOUNTER — CLINICAL SUPPORT (OUTPATIENT)
Dept: CARDIAC REHAB | Facility: CLINIC | Age: 52
End: 2022-01-19
Payer: COMMERCIAL

## 2022-01-19 DIAGNOSIS — I50.42 CHRONIC COMBINED SYSTOLIC AND DIASTOLIC HEART FAILURE (HCC): Primary | ICD-10-CM

## 2022-01-19 PROCEDURE — 93798 PHYS/QHP OP CAR RHAB W/ECG: CPT

## 2022-01-19 NOTE — PROGRESS NOTES
See scanned exercise session detailed report  PHQ9 score today 5 score improved from previous 5, 8 and 13  GAD7 score today 7 previous scores, 8, 9 and 11

## 2022-01-24 ENCOUNTER — CLINICAL SUPPORT (OUTPATIENT)
Dept: CARDIAC REHAB | Facility: CLINIC | Age: 52
End: 2022-01-24
Payer: COMMERCIAL

## 2022-01-24 DIAGNOSIS — I50.42 CHRONIC COMBINED SYSTOLIC AND DIASTOLIC HEART FAILURE (HCC): Primary | ICD-10-CM

## 2022-01-24 PROCEDURE — 93798 PHYS/QHP OP CAR RHAB W/ECG: CPT

## 2022-01-26 ENCOUNTER — CLINICAL SUPPORT (OUTPATIENT)
Dept: CARDIAC REHAB | Facility: CLINIC | Age: 52
End: 2022-01-26
Payer: COMMERCIAL

## 2022-01-26 DIAGNOSIS — I50.42 CHRONIC COMBINED SYSTOLIC AND DIASTOLIC HEART FAILURE (HCC): Primary | ICD-10-CM

## 2022-01-26 PROCEDURE — 93798 PHYS/QHP OP CAR RHAB W/ECG: CPT

## 2022-01-28 ENCOUNTER — APPOINTMENT (OUTPATIENT)
Dept: CARDIAC REHAB | Facility: CLINIC | Age: 52
End: 2022-01-28
Payer: COMMERCIAL

## 2022-01-31 ENCOUNTER — APPOINTMENT (OUTPATIENT)
Dept: CARDIAC REHAB | Facility: CLINIC | Age: 52
End: 2022-01-31
Payer: COMMERCIAL

## 2022-02-14 NOTE — PROGRESS NOTES
Cardiac Rehabilitation Plan of Care   120 Day Reassessment          Today's date: 2022   # of Exercise Sessions Completed: 28  Patient name: Ely Ontiveros      : 1970  Age: 45 y o  MRN: [id number]  Referring Physician: Jose Buchanan MD  Cardiologist: Jose Buchanan  Provider: [place]  Clinician: Phoebe Marino RN    Dx:   Encounter Diagnosis   Name Primary?  Chronic combined systolic and diastolic heart failure ([address] ) Yes     Date of onset: 10/5/2021      SUMMARY OF PROGRESS:  Bertha Godinez completed 28 sessions of the cardiac rehabilitation program  She has not been to cardiac rehabilitation since 2022 due to waiting for insurance verification  Patient stated she ceased smoking the  cigarettes a day  She stated she quit on 2021  Telemetry monitor NSR with PVC at rest and with exercise  She completes 39 minutes of cardiovascular exercise with light weight strength training program  Her exercise MET level increased from 2 8/4 0 to 3 4/5 1 MET's  RPE 3-4  Tolerated exercise well  Denied any complaint of chest discomfort  She stated she has fatigue and shortness of breath, bilateral leg fatigue often with minimal exertion at home  Resting BP's 102//68  Exercise BP's 114//74  Resting HR's   Her main goals are to increase strength and endurance to return to walking outdoors 25 minutes a day, return to weight training and an exercise class  For home exercise, she states that she walks her dog daily for 20-25 mins  She stated she started doing push-ups at home and felt slight dizziness and palpitations post workout  Encouraged her to caution holding breath during exertion  Encouraged hydration and proper breathing techniques to conserve energy  She would also try to improve her PHQ9 score of 13 and GAD7 score of 11  On  PHQ9 improved from 8 to 5 score, GAD7 score improved from 9 to 8 score  Will repeat PHQ9/GAD7 at next visit  Provided her with contact information for behavioral health services and [facility]  She downloaded [employer] but has had trouble accessing it  She has been watching meditation videos on YouTube  She's gained 4 5 lbs since the start of the program  She stated her primary care MD is aware  Provided her with handbook for cardiac rehabilitation  Will continue to monitor  Medication compliance: Yes   Comments: Pt reports to be compliant with medications  Fall Risk: Low   Comments: Ambulates with a steady gait with no assist device and Denies a fall in the past 6 months    EKG Interpretation: NSR with PVC      EXERCISE ASSESSMENT and PLAN    Current Exercise Program in Rehab:       Frequency: 3 days/week       Minutes: 30-43         METS: 4 1-5 1          HR: 112-131   RPE: 3-4         Modalities: Treadmill, UBE, Lifecycle and Recumbent bike      Exercise Progression 30 Day Goals :    Frequency: 3 days/week of cardiac rehab     Supplement with home exercise 2+ days/wk as tolerated    Minutes: 40-45                            >150 mins/wk of moderate intensity exercise   METS: 3 4-6 0   HR: 20 to 30 bpm above resting    RPE: 4-6   Modalities: Treadmill, UBE, Lifecycle and Recumbent bike    Strength trainin-3 days / week  12-15 repetitions  1-2 sets per modality   Will be added following 2-3 weeks of monitored exercise sessions   Modalities: Lateral Raise, Arm Curl, Upright Rows, Front Raises and Shoulder Shrugs    Home Exercise: Type: outdoor walking daily for 20-25 mins    Goals: Attend Rehab regularly, Start a walking program and increase endurance to do an exercise class and weight training    Progression Toward Goals:  Reviewed Pt goals and determined plan of care, Will continue to educate and progress as tolerated      Education: benefit of exercise for CAD risk factors, home exercise guidelines, AHA guidelines to achieve >150 mins/wk of moderate exercise, RPE scale and class: Risk Factors for Heart Disease   Plan:home exercise 30+ mins 2 days opposite CR  Readiness to change: Action:  (Changing behavior)      NUTRITION ASSESSMENT AND PLAN    Weight control:    Starting weight: 123 5   Current weight:   128  Waist circumference:    Startin 5   Current:      Diabetes: N/A  A1c: 5 6    last measured: 10/25/2019    Lipid management: Discussed diet and lipid management and Last lipid profile 2020  Chol 245          Goals:caution sodium in diet and continue consuming a heart healthy diet    Progression Toward Goals: Reviewed Pt goals and determined plan of care, Will continue to educate and progress as tolerated  Education: heart healthy eating  low sodium diet  hydration  education class: Heart Healthy Eating  education class:  Label Reading  Plan: Education class: Reading Food Labels, Education Class: Heart Healthy Eating and avoid processed foods  Readiness to change: Action:  (Changing behavior)      PSYCHOSOCIAL ASSESSMENT AND PLAN    Emotional:  Depression assessment:  PHQ-9 = 5-9 = Mild Depression            Anxiety measure:  RANCHO-7 = 5-9 = Mild anxiety  Self-reported stress level:  4  Social support: Fair    Goals:  Reduce perceived stress to 1-3/10, PHQ-9 - reduced severity by one level, contact behavioral health , increased energy and down load [facility]    Progression Toward Goals: Reviewed Pt goals and determined plan of care, Will continue to educate and progress as tolerated      Education: signs/sxs of depression, benefits of a positive support system, stress management techniques, depression and CAD, benefits of mental health counseling, class:  Stress and Your Health  and class:  Relaxation  Plan: Refer to behavioral health/counseling, PHQ-9 >5 will refer to MD, Refer to [facility], Practice relaxation techniques, Exercise, Spend time outdoors, Read a Book, Keep a positive mindset and Improve relationship with father  Readiness to change: Contemplation:  (Acknowledging that there is a problem but not yet ready or sure of wanting to make a change)      OTHER CORE COMPONENTS     Tobacco:   Social History     Tobacco Use   Smoking Status Current Every Day Smoker    Types: Cigarettes    Last attempt to quit: 2020    Years since quittin 9   Smokeless Tobacco Never Used   Tobacco Comment    2 ciggs a day       Tobacco Use Intervention:   Brief counseling by cardiac rehab professional  Date: 10/26/2021  [place] Quit Line 1-800-QUIT-NOW  Pt continues to smoke 2 a day    Pt is ready to quit    Anginal Symptoms:  excessive fatigue   NTG use: No prescription    Blood pressure:    Restin//68   Exercise: 059//76    Goals: reduced dietary sodium <2300mg, medication compliance and Abstain from smoking    Progression Toward Goals: Reviewed Pt goals and determined plan of care, Will continue to educate and progress as tolerated      Education:  Education class:  Common Heart Medications, Education class: Understanding Heart Disease and smoking cessation  Plan: call free Quitline - 1-800-QUIT-NOW (805-0401), Set target quit date for smoking, reduce number of cigarettes per day, Complete abstention from smoking, Avoid Processed foods, engage in regular exercise and check labels for sodium content  Readiness to change: Action:  (Changing behavior)

## 2022-02-15 NOTE — PROGRESS NOTES
Cardiac Rehabilitation Plan of Care   Discharge          Today's date: 2/15/2022   # of Exercise Sessions Completed: 28  Patient name: Richard Mcgowan      : 1970  Age: 45 y o  MRN: [id number]  Referring Physician: Carnella Halsted, MD  Cardiologist: Carnella Halsted  Provider: [facility]  Clinician: Gurmeet Marino RN    Dx:   Encounter Diagnosis   Name Primary?  Chronic combined systolic and diastolic heart failure ([address] ) Yes     Date of onset: 10/5/2021      SUMMARY OF PROGRESS:    2/15/2022 Miah Tariq stated via phone she will not be returning to cardiac rehabilitation due to financial reasons  She plans to continue to be active walking her dog, She ceased smoking  She will continue to follow up with cardiologist  She is considering joining a fitness center to continue with her exercise  Her exercise MET level improved from 27 5% an increase from 4 0 to 5 1 MET's  Her PHQ 9 score improved from 13 to 5 score  Unable to complete outcome evaluation duet to patient has not returned to cardiac rehabilitation for repeat sub max treadmill test and questionnaires  No change to previous note except she has been discharged form the cardiac rehabilitation program      2022 Miah Tariq completed 28 sessions of the cardiac rehabilitation program  She has not been to cardiac rehabilitation since 2022 due to waiting for insurance verification  Patient stated she ceased smoking the  cigarettes a day  She stated she quit on 2021  Telemetry monitor NSR with PVC at rest and with exercise  She completes 39 minutes of cardiovascular exercise with light weight strength training program  Her exercise MET level increased from 2 8/4 0 to 3 4/5 1 MET's  RPE 3-4  Tolerated exercise well  Denied any complaint of chest discomfort  She stated she has fatigue and shortness of breath, bilateral leg fatigue often with minimal exertion at home  Resting BP's 102//68  Exercise BP's 114//74  Resting HR's   Her main goals are to increase strength and endurance to return to walking outdoors 25 minutes a day, return to weight training and an exercise class  For home exercise, she states that she walks her dog daily for 20-25 mins  She stated she started doing push-ups at home and felt slight dizziness and palpitations post workout  Encouraged her to caution holding breath during exertion  Encouraged hydration and proper breathing techniques to conserve energy  She would also try to improve her PHQ9 score of 13 and GAD7 score of 11  On  PHQ9 improved from 8 to 5 score, GAD7 score improved from 9 to 8 score  Will repeat PHQ9/GAD7 at next visit  Provided her with contact information for behavioral health services and [facility]  She downloaded [employer] but has had trouble accessing it  She has been watching meditation videos on [employer]  She's gained 4 5 lbs since the start of the program  She stated her primary care MD is aware  Provided her with handbook for cardiac rehabilitation  Will continue to monitor       Medication compliance: Yes   Comments: Pt reports to be compliant with medications  Fall Risk: Low   Comments: Ambulates with a steady gait with no assist device and Denies a fall in the past 6 months    EKG Interpretation: NSR with PVC      EXERCISE ASSESSMENT and PLAN    Current Exercise Program in Rehab:       Frequency: 3 days/week       Minutes: 30-43         METS: 4 1-5 1          HR: 112-131   RPE: 3-4         Modalities: Treadmill, UBE, Lifecycle and Recumbent bike      Exercise Progression 30 Day Goals :    Frequency: 3 days/week of cardiac rehab     Supplement with home exercise 2+ days/wk as tolerated    Minutes: 40-45                            >150 mins/wk of moderate intensity exercise   METS: 3 4-6 0   HR: 20 to 30 bpm above resting    RPE: 4-6   Modalities: Treadmill, UBE, Lifecycle and Recumbent bike    Strength trainin-3 days / week  -15 repetitions  1-2 sets per modality   Will be added following 2-3 weeks of monitored exercise sessions   Modalities: Lateral Raise, Arm Curl, Upright Rows, Front Raises and Shoulder Shrugs    Home Exercise: Type: outdoor walking daily for 20-25 mins    Goals: Attend Rehab regularly, Start a walking program and increase endurance to do an exercise class and weight training    Progression Toward Goals:  Reviewed Pt goals and determined plan of care, Will continue to educate and progress as tolerated  Education: benefit of exercise for CAD risk factors, home exercise guidelines, AHA guidelines to achieve >150 mins/wk of moderate exercise, RPE scale and class: Risk Factors for Heart Disease   Plan:home exercise 30+ mins 2 days opposite CR  Readiness to change: Action:  (Changing behavior)      NUTRITION ASSESSMENT AND PLAN    Weight control:    Starting weight: 123 5   Current weight:   128  Waist circumference:    Startin 5   Current:      Diabetes: N/A  A1c: 5 6    last measured: 10/25/2019    Lipid management: Discussed diet and lipid management and Last lipid profile 2020  Chol 245          Goals:caution sodium in diet and continue consuming a heart healthy diet    Progression Toward Goals: Reviewed Pt goals and determined plan of care, Will continue to educate and progress as tolerated      Education: heart healthy eating  low sodium diet  hydration  education class: Heart Healthy Eating  education class:  Label Reading  Plan: Education class: Reading Food Labels, Education Class: Heart Healthy Eating and avoid processed foods  Readiness to change: Action:  (Changing behavior)      PSYCHOSOCIAL ASSESSMENT AND PLAN    Emotional:  Depression assessment:  PHQ-9 = 5-9 = Mild Depression            Anxiety measure:  RANCHO-7 = 5-9 = Mild anxiety  Self-reported stress level:  4  Social support: Fair    Goals:  Reduce perceived stress to 1-3/10, PHQ-9 - reduced severity by one level, contact behavioral health , increased energy and down load [facility]    Progression Toward Goals: Reviewed Pt goals and determined plan of care, Will continue to educate and progress as tolerated  Education: signs/sxs of depression, benefits of a positive support system, stress management techniques, depression and CAD, benefits of mental health counseling, class:  Stress and Your Health  and class:  Relaxation  Plan: Refer to behavioral health/counseling, PHQ-9 >5 will refer to MD, Refer to [employer], Practice relaxation techniques, Exercise, Spend time outdoors, Read a Book, Keep a positive mindset and Improve relationship with father  Readiness to change: Contemplation:  (Acknowledging that there is a problem but not yet ready or sure of wanting to make a change)      OTHER CORE COMPONENTS     Tobacco:   Social History     Tobacco Use   Smoking Status Current Every Day Smoker    Types: Cigarettes    Last attempt to quit: 2020    Years since quittin 9   Smokeless Tobacco Never Used   Tobacco Comment    2 ciggs a day       Tobacco Use Intervention:   Brief counseling by cardiac rehab professional  Date: 10/26/2021  [place] Quit Line 1-800-QUIT-NOW  Pt continues to smoke 2 a day    Pt is ready to quit    Anginal Symptoms:  excessive fatigue   NTG use: No prescription    Blood pressure:    Restin//68   Exercise: 114//74    Goals: reduced dietary sodium <2300mg, medication compliance and Abstain from smoking    Progression Toward Goals: Reviewed Pt goals and determined plan of care, Will continue to educate and progress as tolerated      Education:  Education class:  Common Heart Medications, Education class: Understanding Heart Disease and smoking cessation  Plan: call free Quitline - 1-800-QUIT-NOW (407-4050), Set target quit date for smoking, reduce number of cigarettes per day, Complete abstention from smoking, Avoid Processed foods, engage in regular exercise and check labels for sodium content  Readiness to change: Action:  (Changing behavior)

## 2022-03-27 DIAGNOSIS — E78.5 DYSLIPIDEMIA: ICD-10-CM

## 2022-03-28 RX ORDER — ROSUVASTATIN CALCIUM 5 MG/1
TABLET, COATED ORAL
Qty: 45 TABLET | Refills: 1 | Status: SHIPPED | OUTPATIENT
Start: 2022-03-28

## 2022-04-19 DIAGNOSIS — R00.0 TACHYCARDIA: ICD-10-CM

## 2022-04-19 RX ORDER — METOPROLOL SUCCINATE 25 MG/1
TABLET, EXTENDED RELEASE ORAL
Qty: 90 TABLET | Refills: 3 | Status: SHIPPED | OUTPATIENT
Start: 2022-04-19 | End: 2022-04-28

## 2022-04-19 NOTE — TELEPHONE ENCOUNTER
Cardiovascular:  Beta Blockers Failed 04/19/2022 08:51 AM   Protocol Details  BP completed in the last 6 months    Valid encounter within last 6 months    Last Heart Rate in normal range and within 180 days       To be filled at: [address] ([address] [facility] ([address])

## 2022-04-28 DIAGNOSIS — R00.0 TACHYCARDIA: ICD-10-CM

## 2022-04-28 RX ORDER — METOPROLOL SUCCINATE 25 MG/1
TABLET, EXTENDED RELEASE ORAL
Qty: 90 TABLET | Refills: 3 | Status: SHIPPED | OUTPATIENT
Start: 2022-04-28

## 2022-04-28 NOTE — TELEPHONE ENCOUNTER
Cardiovascular:  Beta Blockers Failed 04/28/2022 06:35 AM   Protocol Details  BP completed in the last 6 months    Valid encounter within last 6 months    Last Heart Rate in normal range and within 180 days       To be filled at: [address] ([address], [address] [facility] ([address])

## 2022-05-04 ENCOUNTER — OFFICE VISIT (OUTPATIENT)
Dept: CARDIOLOGY CLINIC | Facility: CLINIC | Age: 52
End: 2022-05-04
Payer: COMMERCIAL

## 2022-05-04 VITALS
TEMPERATURE: 98.4 F | WEIGHT: 132 LBS | HEART RATE: 86 BPM | HEIGHT: 68 IN | BODY MASS INDEX: 20 KG/M2 | SYSTOLIC BLOOD PRESSURE: 120 MMHG | OXYGEN SATURATION: 98 % | DIASTOLIC BLOOD PRESSURE: 80 MMHG

## 2022-05-04 DIAGNOSIS — E03.9 HYPOTHYROIDISM, UNSPECIFIED TYPE: ICD-10-CM

## 2022-05-04 DIAGNOSIS — R94.31 ABNORMAL EKG: ICD-10-CM

## 2022-05-04 DIAGNOSIS — I42.0 DILATED CARDIOMYOPATHY (HCC): ICD-10-CM

## 2022-05-04 DIAGNOSIS — E78.5 DYSLIPIDEMIA: ICD-10-CM

## 2022-05-04 DIAGNOSIS — I50.42 CHRONIC COMBINED SYSTOLIC AND DIASTOLIC HEART FAILURE, NYHA CLASS 2 (HCC): ICD-10-CM

## 2022-05-04 DIAGNOSIS — R06.02 SHORTNESS OF BREATH: ICD-10-CM

## 2022-05-04 DIAGNOSIS — R00.0 TACHYCARDIA: ICD-10-CM

## 2022-05-04 PROCEDURE — 99214 OFFICE O/P EST MOD 30 MIN: CPT | Performed by: INTERNAL MEDICINE

## 2022-05-04 RX ORDER — SACUBITRIL AND VALSARTAN 24; 26 MG/1; MG/1
1 TABLET, FILM COATED ORAL 2 TIMES DAILY
Qty: 60 TABLET | Refills: 1 | Status: SHIPPED | OUTPATIENT
Start: 2022-05-04 | End: 2022-05-17 | Stop reason: CLARIF

## 2022-05-04 NOTE — PATIENT INSTRUCTIONS
Patient want to be on Entresto  Samples of Entresto 24/26 mg twice a day  Before starting Entresto she will stop taking losartan for 48 hours and then start taking it  Do not stop taking metoprolol XL as you taking before  Check BMP in 2 weeks

## 2022-05-17 ENCOUNTER — TELEPHONE (OUTPATIENT)
Dept: CARDIOLOGY CLINIC | Facility: CLINIC | Age: 52
End: 2022-05-17

## 2022-05-17 DIAGNOSIS — I50.42 CHRONIC COMBINED SYSTOLIC AND DIASTOLIC HEART FAILURE, NYHA CLASS 2 (HCC): ICD-10-CM

## 2022-05-17 DIAGNOSIS — I42.0 DILATED CARDIOMYOPATHY (HCC): Primary | ICD-10-CM

## 2022-05-17 RX ORDER — LOSARTAN POTASSIUM 50 MG/1
50 TABLET ORAL DAILY
Qty: 90 TABLET | Refills: 3 | Status: SHIPPED | OUTPATIENT
Start: 2022-05-17

## 2022-05-17 NOTE — TELEPHONE ENCOUNTER
Pt insurance will still not cover Entresto  She is asking for another script of Losartan 50mg to be sent to [facility]  Ok to send?

## 2022-08-03 PROBLEM — R00.2 PALPITATIONS: Status: ACTIVE | Noted: 2020-07-30

## 2022-08-03 PROBLEM — Z86.79 S/P CATHETER ABLATION OF SLOW PATHWAY: Status: ACTIVE | Noted: 2020-09-17

## 2022-08-03 PROBLEM — I50.42 CHRONIC COMBINED SYSTOLIC AND DIASTOLIC CHF (CONGESTIVE HEART FAILURE) (HCC): Status: ACTIVE | Noted: 2022-08-03

## 2022-08-03 PROBLEM — I42.8 NONISCHEMIC CARDIOMYOPATHY (HCC): Status: ACTIVE | Noted: 2021-03-31

## 2022-08-03 PROBLEM — Z98.890 S/P CATHETER ABLATION OF SLOW PATHWAY: Status: ACTIVE | Noted: 2020-09-17

## 2022-08-04 ENCOUNTER — OFFICE VISIT (OUTPATIENT)
Dept: INTERNAL MEDICINE CLINIC | Facility: CLINIC | Age: 52
End: 2022-08-04
Payer: COMMERCIAL

## 2022-08-04 VITALS
OXYGEN SATURATION: 100 % | HEART RATE: 92 BPM | HEIGHT: 68 IN | WEIGHT: 128 LBS | DIASTOLIC BLOOD PRESSURE: 80 MMHG | BODY MASS INDEX: 19.4 KG/M2 | TEMPERATURE: 97.6 F | SYSTOLIC BLOOD PRESSURE: 120 MMHG

## 2022-08-04 DIAGNOSIS — E03.9 HYPOTHYROIDISM, UNSPECIFIED TYPE: Primary | ICD-10-CM

## 2022-08-04 DIAGNOSIS — I50.42 CHRONIC COMBINED SYSTOLIC AND DIASTOLIC CHF (CONGESTIVE HEART FAILURE) (HCC): ICD-10-CM

## 2022-08-04 DIAGNOSIS — I47.1 PSVT (PAROXYSMAL SUPRAVENTRICULAR TACHYCARDIA) (HCC): ICD-10-CM

## 2022-08-04 DIAGNOSIS — I42.0 DILATED CARDIOMYOPATHY (HCC): ICD-10-CM

## 2022-08-04 DIAGNOSIS — I42.8 NONISCHEMIC CARDIOMYOPATHY (HCC): ICD-10-CM

## 2022-08-04 DIAGNOSIS — F41.9 ANXIETY: ICD-10-CM

## 2022-08-04 PROCEDURE — 3725F SCREEN DEPRESSION PERFORMED: CPT | Performed by: INTERNAL MEDICINE

## 2022-08-04 PROCEDURE — 99214 OFFICE O/P EST MOD 30 MIN: CPT | Performed by: INTERNAL MEDICINE

## 2022-08-04 RX ORDER — ALPRAZOLAM 0.5 MG/1
0.5 TABLET ORAL 2 TIMES DAILY PRN
Qty: 60 TABLET | Refills: 0 | Status: SHIPPED | OUTPATIENT
Start: 2022-08-04 | End: 2022-09-01 | Stop reason: SDUPTHER

## 2022-08-04 NOTE — ASSESSMENT & PLAN NOTE
Continue current treatment continue Lasix losartan metoprolol fluid restriction and follow with Cardiology in 3 weeks
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
## 2022-08-04 NOTE — ASSESSMENT & PLAN NOTE
PHQ-2/9 Depression Screening    Little interest or pleasure in doing things: 0 - not at all  Feeling down, depressed, or hopeless: 0 - not at all  Trouble falling or staying asleep, or sleeping too much: 0 - not at all  Feeling tired or having little energy: 0 - not at all  Poor appetite or overeatin - not at all  Feeling bad about yourself - or that you are a failure or have let yourself or your family down: 0 - not at all  Trouble concentrating on things, such as reading the newspaper or watching television: 0 - not at all  Moving or speaking so slowly that other people could have noticed  Or the opposite - being so fidgety or restless that you have been moving around a lot more than usual: 0 - not at all  Thoughts that you would be better off dead, or of hurting yourself in some way: 0 - not at all  PHQ-2 Score: 0  PHQ-2 Interpretation: Negative depression screen  PHQ-9 Score: 0   PHQ-9 Interpretation: No or Minimal depression        RANCHO-7 Flowsheet Screening    Flowsheet Row Most Recent Value   Over the last 2 weeks, how often have you been bothered by any of the following problems?     Feeling nervous, anxious, or on edge 0   Not being able to stop or control worrying 0   Worrying too much about different things 1   Trouble relaxing 0   Being so restless that it is hard to sit still 0   Becoming easily annoyed or irritable 0   Feeling afraid as if something awful might happen 0   RANCHO-7 Total Score 1      Patient prescribe Xanax 0 5 mg twice a day 1 month supply and recommended counseling and to be seen by psychiatrist

## 2022-08-04 NOTE — ASSESSMENT & PLAN NOTE
Wt Readings from Last 3 Encounters:   08/04/22 58 1 kg (128 lb)   05/04/22 59 9 kg (132 lb)   10/04/21 56 2 kg (124 lb)     PE patient's CHF seems to be controlled patient is on metoprolol losartan and Lasix patient was recommended Camille Chaudhry note under approving Entresto very costly cannot afford fluid restriction advised patient's CHF symptoms seems to be under control to be seen by Cardiology in 3 weeks echocardiogram findings reviewed

## 2022-08-04 NOTE — ASSESSMENT & PLAN NOTE
Patient's heart rate controlled for now regular no more episode of supraventricular tachycardia controlled on metoprolol patient to be seen by Cardiology in 3 weeks previous cardiology follow-up reviewed discussed at length

## 2022-08-04 NOTE — PATIENT INSTRUCTIONS
Follow with Consultants as per their and our suggestion    Follow up in one month or as needed earlier    Follow all instructions as advised and discussed  Take your medications as prescribed  Call the office immediately if you experience any side effects  Ask questions if you do not understand  Keep your scheduled appointment as advised or come sooner if necessary or in doubt  Best time to call for non-urgent matter or questions on weekdays is between 9am and 12 noon  See physician for any new symptoms or worsening of current symptoms  Urgent or emergent situations call 911 and report to nearest emergency room      I spent  30 minutes taking care of this patient including clinical care, conseling, collaboration, chart, lab and consultaion review as appropriate

## 2022-08-29 DIAGNOSIS — F51.01 PRIMARY INSOMNIA: Primary | ICD-10-CM

## 2022-08-29 RX ORDER — ZOLPIDEM TARTRATE 10 MG/1
10 TABLET ORAL
Qty: 30 TABLET | Refills: 1 | Status: SHIPPED | OUTPATIENT
Start: 2022-08-29 | End: 2022-10-27 | Stop reason: SDUPTHER

## 2022-08-29 RX ORDER — ZOLPIDEM TARTRATE 10 MG/1
10 TABLET ORAL
COMMUNITY
End: 2022-08-29 | Stop reason: SDUPTHER

## 2022-09-01 ENCOUNTER — OFFICE VISIT (OUTPATIENT)
Dept: INTERNAL MEDICINE CLINIC | Facility: CLINIC | Age: 52
End: 2022-09-01
Payer: COMMERCIAL

## 2022-09-01 VITALS
HEART RATE: 84 BPM | DIASTOLIC BLOOD PRESSURE: 78 MMHG | BODY MASS INDEX: 19.55 KG/M2 | SYSTOLIC BLOOD PRESSURE: 122 MMHG | WEIGHT: 129 LBS | TEMPERATURE: 97.6 F | HEIGHT: 68 IN | OXYGEN SATURATION: 100 % | RESPIRATION RATE: 14 BRPM

## 2022-09-01 DIAGNOSIS — E78.00 HYPERCHOLESTEREMIA: ICD-10-CM

## 2022-09-01 DIAGNOSIS — E83.52 HYPERCALCEMIA: ICD-10-CM

## 2022-09-01 DIAGNOSIS — I47.1 PSVT (PAROXYSMAL SUPRAVENTRICULAR TACHYCARDIA) (HCC): ICD-10-CM

## 2022-09-01 DIAGNOSIS — F41.9 ANXIETY: ICD-10-CM

## 2022-09-01 DIAGNOSIS — D64.9 ANEMIA, UNSPECIFIED TYPE: ICD-10-CM

## 2022-09-01 DIAGNOSIS — Z98.890 S/P CATHETER ABLATION OF SLOW PATHWAY: ICD-10-CM

## 2022-09-01 DIAGNOSIS — E03.9 HYPOTHYROIDISM, UNSPECIFIED TYPE: ICD-10-CM

## 2022-09-01 DIAGNOSIS — I50.42 CHRONIC COMBINED SYSTOLIC AND DIASTOLIC CHF (CONGESTIVE HEART FAILURE) (HCC): Primary | ICD-10-CM

## 2022-09-01 DIAGNOSIS — Z86.79 S/P CATHETER ABLATION OF SLOW PATHWAY: ICD-10-CM

## 2022-09-01 DIAGNOSIS — E55.9 VITAMIN D DEFICIENCY: ICD-10-CM

## 2022-09-01 DIAGNOSIS — R79.89 ABNORMAL LFTS: ICD-10-CM

## 2022-09-01 DIAGNOSIS — I42.8 NONISCHEMIC CARDIOMYOPATHY (HCC): ICD-10-CM

## 2022-09-01 DIAGNOSIS — N18.1 STAGE 1 CHRONIC KIDNEY DISEASE: ICD-10-CM

## 2022-09-01 PROCEDURE — 99213 OFFICE O/P EST LOW 20 MIN: CPT | Performed by: INTERNAL MEDICINE

## 2022-09-01 PROCEDURE — 3725F SCREEN DEPRESSION PERFORMED: CPT | Performed by: INTERNAL MEDICINE

## 2022-09-01 RX ORDER — ALPRAZOLAM 0.5 MG/1
0.5 TABLET ORAL 2 TIMES DAILY PRN
Qty: 60 TABLET | Refills: 0 | Status: SHIPPED | OUTPATIENT
Start: 2022-09-01 | End: 2022-10-06 | Stop reason: SDUPTHER

## 2022-09-01 NOTE — PATIENT INSTRUCTIONS
Pt is symptom free for this problem    This diagnosis or problem is stable/well controlled  Patient is advised to continue same meds as outlined in medicine list  Pt is advised to continue to follow with specialist if they are following for this problme
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
## 2022-09-01 NOTE — ASSESSMENT & PLAN NOTE
Wt Readings from Last 3 Encounters:   09/01/22 58 5 kg (129 lb)   08/04/22 58 1 kg (128 lb)   05/04/22 59 9 kg (132 lb)

## 2022-09-06 ENCOUNTER — OFFICE VISIT (OUTPATIENT)
Dept: CARDIOLOGY CLINIC | Facility: CLINIC | Age: 52
End: 2022-09-06
Payer: COMMERCIAL

## 2022-09-06 VITALS
TEMPERATURE: 97 F | HEART RATE: 88 BPM | SYSTOLIC BLOOD PRESSURE: 130 MMHG | WEIGHT: 132 LBS | DIASTOLIC BLOOD PRESSURE: 80 MMHG | OXYGEN SATURATION: 98 % | BODY MASS INDEX: 20 KG/M2 | HEIGHT: 68 IN

## 2022-09-06 DIAGNOSIS — I42.0 DILATED CARDIOMYOPATHY (HCC): ICD-10-CM

## 2022-09-06 DIAGNOSIS — I47.1 PSVT (PAROXYSMAL SUPRAVENTRICULAR TACHYCARDIA) (HCC): ICD-10-CM

## 2022-09-06 DIAGNOSIS — E03.9 HYPOTHYROIDISM, UNSPECIFIED TYPE: ICD-10-CM

## 2022-09-06 DIAGNOSIS — E78.5 DYSLIPIDEMIA: ICD-10-CM

## 2022-09-06 DIAGNOSIS — R00.0 TACHYCARDIA: ICD-10-CM

## 2022-09-06 DIAGNOSIS — I50.42 CHRONIC COMBINED SYSTOLIC AND DIASTOLIC HEART FAILURE, NYHA CLASS 2 (HCC): ICD-10-CM

## 2022-09-06 DIAGNOSIS — R06.02 SHORTNESS OF BREATH: ICD-10-CM

## 2022-09-06 DIAGNOSIS — R94.31 ABNORMAL EKG: ICD-10-CM

## 2022-09-06 PROCEDURE — 99214 OFFICE O/P EST MOD 30 MIN: CPT | Performed by: INTERNAL MEDICINE
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
## 2022-10-01 DIAGNOSIS — I47.1 PSVT (PAROXYSMAL SUPRAVENTRICULAR TACHYCARDIA) (HCC): ICD-10-CM

## 2022-10-01 DIAGNOSIS — E78.5 DYSLIPIDEMIA: ICD-10-CM

## 2022-10-03 RX ORDER — METOPROLOL SUCCINATE 100 MG/1
TABLET, EXTENDED RELEASE ORAL
Qty: 90 TABLET | Refills: 3 | Status: SHIPPED | OUTPATIENT
Start: 2022-10-03

## 2022-10-03 RX ORDER — ROSUVASTATIN CALCIUM 5 MG/1
TABLET, COATED ORAL
Qty: 45 TABLET | Refills: 3 | Status: SHIPPED | OUTPATIENT
Start: 2022-10-03

## 2022-10-06 ENCOUNTER — OFFICE VISIT (OUTPATIENT)
Dept: INTERNAL MEDICINE CLINIC | Facility: CLINIC | Age: 52
End: 2022-10-06
Payer: COMMERCIAL

## 2022-10-06 VITALS
DIASTOLIC BLOOD PRESSURE: 84 MMHG | SYSTOLIC BLOOD PRESSURE: 132 MMHG | HEART RATE: 86 BPM | BODY MASS INDEX: 20.16 KG/M2 | TEMPERATURE: 97.8 F | WEIGHT: 133 LBS | HEIGHT: 68 IN | OXYGEN SATURATION: 100 % | RESPIRATION RATE: 14 BRPM

## 2022-10-06 DIAGNOSIS — I50.42 CHRONIC COMBINED SYSTOLIC AND DIASTOLIC CHF (CONGESTIVE HEART FAILURE) (HCC): ICD-10-CM

## 2022-10-06 DIAGNOSIS — F41.9 ANXIETY: ICD-10-CM

## 2022-10-06 DIAGNOSIS — E03.9 HYPOTHYROIDISM, UNSPECIFIED TYPE: ICD-10-CM

## 2022-10-06 DIAGNOSIS — E83.52 HYPERCALCEMIA: ICD-10-CM

## 2022-10-06 DIAGNOSIS — I47.1 PSVT (PAROXYSMAL SUPRAVENTRICULAR TACHYCARDIA) (HCC): Primary | ICD-10-CM

## 2022-10-06 DIAGNOSIS — L50.9 URTICARIA: ICD-10-CM

## 2022-10-06 DIAGNOSIS — I42.0 DILATED CARDIOMYOPATHY (HCC): ICD-10-CM

## 2022-10-06 PROCEDURE — 99214 OFFICE O/P EST MOD 30 MIN: CPT | Performed by: INTERNAL MEDICINE

## 2022-10-06 RX ORDER — ALPRAZOLAM 0.5 MG/1
0.5 TABLET ORAL 2 TIMES DAILY PRN
Qty: 60 TABLET | Refills: 0 | Status: SHIPPED | OUTPATIENT
Start: 2022-10-06

## 2022-10-06 NOTE — ASSESSMENT & PLAN NOTE
Continue Claritin 1 a day for prevention maculopapular erythematous intermittent rash awaiting to be seen by dermatologist

## 2022-10-06 NOTE — ASSESSMENT & PLAN NOTE
Due to paroxysmal supraventricular seems to be controlled now on Toprol- mg along with status post ablation

## 2022-10-06 NOTE — ASSESSMENT & PLAN NOTE
Patient's heart rate controlled for now regular no more episode of supraventricular tachycardia controlled on metoprolol patient to be seen by Cardiology in 3 weeks previous cardiology [id number] with cardiology no recurrence status post ablation
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
## 2022-10-06 NOTE — ASSESSMENT & PLAN NOTE
Followed by cardiology patient could not afford Entresto so patient is now on losartan and Toprol-XL patient is not volume overloaded no signs of CHF or exertional except exertional dyspnea continue on low-salt diet fluid restriction and weight monitoring

## 2022-10-06 NOTE — ASSESSMENT & PLAN NOTE
Wt Readings from Last 3 Encounters:   10/06/22 60 3 kg (133 lb)   09/06/22 59 9 kg (132 lb)   09/01/22 58 5 kg (129 lb)     PE patient's CHF seems to be controlled patient is on metoprolol losartan and Lasix patient was recommended Camille Chaudhry note under approving Entresto very costly cannot afford fluid restriction advised patient's CHF symptoms seems to be under control to be seen by Cardiology in 3 weeks echocardiogram f not volume overloaded recently evaluated by Cardiology could not take Entresto on losartan and metoprolol and low-salt diet fluid restriction symptoms seems to be controlled except of exertional dyspnea

## 2022-10-27 DIAGNOSIS — F51.01 PRIMARY INSOMNIA: ICD-10-CM

## 2022-10-27 RX ORDER — ZOLPIDEM TARTRATE 10 MG/1
10 TABLET ORAL
Qty: 30 TABLET | Refills: 1 | Status: SHIPPED | OUTPATIENT
Start: 2022-10-27

## 2022-11-03 ENCOUNTER — OFFICE VISIT (OUTPATIENT)
Dept: INTERNAL MEDICINE CLINIC | Facility: CLINIC | Age: 52
End: 2022-11-03

## 2022-11-03 VITALS
TEMPERATURE: 97.6 F | BODY MASS INDEX: 19.85 KG/M2 | SYSTOLIC BLOOD PRESSURE: 120 MMHG | DIASTOLIC BLOOD PRESSURE: 80 MMHG | WEIGHT: 131 LBS | HEART RATE: 90 BPM | OXYGEN SATURATION: 99 % | RESPIRATION RATE: 16 BRPM | HEIGHT: 68 IN

## 2022-11-03 DIAGNOSIS — R07.9 CHEST PAIN, UNSPECIFIED TYPE: ICD-10-CM

## 2022-11-03 DIAGNOSIS — R00.0 TACHYCARDIA: ICD-10-CM

## 2022-11-03 DIAGNOSIS — I50.42 CHRONIC COMBINED SYSTOLIC AND DIASTOLIC CHF (CONGESTIVE HEART FAILURE) (HCC): ICD-10-CM

## 2022-11-03 DIAGNOSIS — E03.9 HYPOTHYROIDISM, UNSPECIFIED TYPE: ICD-10-CM

## 2022-11-03 DIAGNOSIS — C81.90 HODGKIN LYMPHOMA, UNSPECIFIED HODGKIN LYMPHOMA TYPE, UNSPECIFIED BODY REGION (HCC): ICD-10-CM

## 2022-11-03 DIAGNOSIS — L50.9 URTICARIA: ICD-10-CM

## 2022-11-03 DIAGNOSIS — F41.9 ANXIETY: ICD-10-CM

## 2022-11-03 DIAGNOSIS — I42.8 NONISCHEMIC CARDIOMYOPATHY (HCC): ICD-10-CM

## 2022-11-03 DIAGNOSIS — I42.0 DILATED CARDIOMYOPATHY (HCC): Primary | ICD-10-CM

## 2022-11-03 DIAGNOSIS — I47.1 PSVT (PAROXYSMAL SUPRAVENTRICULAR TACHYCARDIA) (HCC): ICD-10-CM

## 2022-11-03 RX ORDER — ASPIRIN 81 MG/1
81 TABLET, CHEWABLE ORAL DAILY
Qty: 90 TABLET | Refills: 1 | Status: SHIPPED | OUTPATIENT
Start: 2022-11-03

## 2022-11-03 RX ORDER — ALPRAZOLAM 0.5 MG/1
0.5 TABLET ORAL 2 TIMES DAILY PRN
Qty: 60 TABLET | Refills: 1 | Status: SHIPPED | OUTPATIENT
Start: 2022-11-03

## 2022-11-03 RX ORDER — ALPRAZOLAM 0.5 MG/1
0.5 TABLET ORAL 2 TIMES DAILY PRN
Qty: 60 TABLET | Refills: 0 | Status: SHIPPED | OUTPATIENT
Start: 2022-11-03 | End: 2022-11-03

## 2022-11-03 NOTE — ASSESSMENT & PLAN NOTE
Patient's heart rate controlled for now regular no more episode of supraventricular tachycardia controlled on metoprolol no change since the last visit a awaiting cardiology follow-up in 2 months

## 2022-11-03 NOTE — ASSESSMENT & PLAN NOTE
Patient evaluated by Cardiology until now no evidence of any coronary artery disease chest pain possibly due to cardiomyopathy

## 2022-11-03 NOTE — ASSESSMENT & PLAN NOTE
Followed by cardiology patient could not afford Entresto so patient is now on losartan and Toprol-XL patient is not volume overload controlled no evidence of any CHF continue current treatment

## 2022-11-03 NOTE — PATIENT INSTRUCTIONS
Follow-up with a new primary care physicianHeart Failure   AMBULATORY CARE:   Heart failure  is a condition that does not allow your heart to fill or pump properly  Not enough oxygen in your blood gets to your organs and tissues  Fluid may not move through your body properly  Fluid builds up and causes swelling and trouble breathing  This is known as congestive heart failure  Heart failure may start in the left or right ventricle  Heart failure is often caused by damage or injury to your heart  The damage may be caused by other heart problems, diabetes, or high blood pressure  The damage may have also been caused by an infection  Heart failure is a long-term condition that tends to get worse over time  It is important to manage your health to improve your quality of life  Common signs and symptoms:   Trouble breathing with activity that worsens to trouble breathing at rest    Shortness of breath while lying flat    Severe shortness of breath and coughing at night that usually wakes you    Feeling lightheaded when you stand up    Purple color around your mouth and nails    Confusion or anxiety    Chest pain at night    Periods of no breathing, then breathing fast    Lack of energy (often worsened by physical activity), or trouble sleeping    Swelling in your ankles, legs, or abdomen    Heartbeat that is fast or not regular    Fingers and toes feel cool to the touch    Call your local emergency number (911 in the [address] [facility]) if:   You have any of the following signs of a heart attack:      Squeezing, pressure, or pain in your chest    You may  also have any of the following:     Discomfort or pain in your back, neck, jaw, stomach, or arm    Shortness of breath    Nausea or vomiting    Lightheadedness or a sudden cold sweat      Call your doctor if:   Your heartbeat is fast, slow, or uneven all the time      You have symptoms of worsening heart failure:      Shortness of breath at rest, at night, or that is getting worse in any way    Weight gain of 3 or more pounds (1 4 kg) in a day, or more than your healthcare provider says is okay    More swelling in your legs or ankles    Abdominal pain or swelling    More coughing    Loss of appetite    Feeling tired all the time    You feel hopeless or depressed, or you have lost interest in things you used to enjoy  You often feel worried or afraid  You have questions or concerns about your condition or care  Treatment for heart failure  may include any of the following:  Medicines  may be needed to help regulate your heart rhythm  You may also need medicines to lower your blood pressure, and to decrease extra fluids  Oxygen  may help you breathe easier if your oxygen level is lower than normal  A CPAP machine may be used to keep your airway open while you sleep  Cardiac rehab  is a program run by specialists who will help you safely strengthen your heart  In the program you will learn about exercise, relaxation, stress management, and heart-healthy nutrition  Cardiac rehab may be recommended if your heart failure is not severe  Surgery  can be done to implant a pacemaker or another device in your chest to regulate your heart rhythm  Other types of surgery can open blocked heart vessels, replace a damaged heart valve, or remove scar tissue  Manage swelling from extra fluid:   Elevate (raise) your legs above the level of your heart  This will help with fluid that builds up in your legs or ankles  Elevate your legs as often as possible during the day  Prop your legs on pillows or blankets to keep them elevated comfortably  Try not to stand for long periods of time during the day  Move around to keep your blood circulating  Limit sodium (salt)  Ask how much sodium you can have each day  Your healthcare provider may give you a limit, such as 2,300 milligrams (mg) a day   Your provider or a dietitian can teach you how to read food labels for the number of mg in a food  He or she can also help you find ways to have less salt  For example, if you add salt to food as you cook, do not add more at the table  Drink liquids as directed  You may need to limit the amount of liquid you drink within 24 hours  Your healthcare provider will tell you how much liquid to have and which liquids are best for you  He or she may tell you to limit liquid to 1 5 to 2 liters in a day  He or she will also tell you how often to drink liquid throughout the day  Weigh yourself every morning  Use the same scale, in the same spot  Do this after you use the bathroom, but before you eat or drink  Wear the same type of clothing each time  Write down your weight and call your healthcare provider if you have a sudden weight gain  Swelling and weight gain are signs of fluid buildup  Manage heart failure: Your quality of life may improve with treatment and the following:  Do not smoke  Nicotine and other chemicals in cigarettes and cigars can cause lung and heart damage  Ask your healthcare provider for information if you currently smoke and need help to quit  E-cigarettes or smokeless tobacco still contain nicotine  Talk to your healthcare provider before you use these products  Do not drink alcohol or use illegal drugs  Alcohol and drugs can increase your risk for high blood pressure, diabetes, and coronary artery disease  Eat heart-healthy foods  Heart-healthy foods include fruits, vegetables, lean meat (such as beef, chicken, or pork), and low-fat dairy products  Fatty fish such as salmon and tuna are also heart healthy  Other heart-healthy foods include walnuts, whole-grain breads, beans, and cooked beans  Replace butter and margarine with heart-healthy oils such as olive oil or canola oil  Your provider or a dietitian can help you create heart-healthy meal plans  Manage any chronic health conditions you have    These include high blood pressure, diabetes, obesity, high cholesterol, metabolic syndrome, and COPD  You will have fewer symptoms if you manage these health conditions  Follow your healthcare provider's recommendations and follow up with him or her regularly  Maintain a healthy weight  Being overweight can increase your risk for high blood pressure, diabetes, and coronary artery disease  These conditions can make your symptoms worse  Ask your healthcare provider how much you should weigh  Ask him or her to help you create a weight loss plan if you are overweight  Stay active  Activity can help keep your symptoms from getting worse  Walking is a type of physical activity that helps maintain your strength and improve your mood  Physical activity also helps you manage your weight  Work with your healthcare provider to create an exercise plan that is right for you  Get vaccines as directed  The flu and pneumonia can be severe for a person who has heart failure  Vaccines protect you from these infections  Get a flu shot every year as soon as it is recommended, usually in September or October  You may also need the pneumonia vaccine  Your healthcare provider can tell you if you need other vaccines, and when to get them  Follow up with your doctor or cardiologist within 7 days or as directed: You may need to return for other tests  You may need home health care  A healthcare provider will monitor your vital signs, weight, and make sure your medicines are working  Write down your questions so you remember to ask them during your visits  Join a support group:  Heart failure can be difficult to manage  It may be helpful to talk with others who have heart failure  You may learn how to better manage your condition or get emotional support  For more information:  [address] [place] [address] Maxine   Phone: 9- 445 - [phone]  Web Address: https://www strong [employer]/  [facility] 2022 Information is for End User's use only and may not be sold, redistributed or otherwise used for commercial purposes  All illustrations and images included in CareNotes® are the copyrighted property of A D [employer] M [employer] Inc  or Osiris Mahan  The above information is an  only  It is not intended as medical advice for individual conditions or treatments  Talk to your doctor, nurse or pharmacist before following any medical regimen to see if it is safe and effective for you

## 2022-11-03 NOTE — ASSESSMENT & PLAN NOTE
Wt Readings from Last 3 Encounters:   11/03/22 59 4 kg (131 lb)   10/06/22 60 3 kg (133 lb)   09/06/22 59 9 kg (132 lb)      patient's CHF seems to be controlled patient is on metoprolol losartan and Lasix patient was recommended insurance not approving Entresto very costly cannot afford fluid restriction advised patient's CHF symptoms seems to be under control to be seen by Cardiology in 2 months echocardiogram   findings reviewed not volume overloaded recently evaluated by Cardiology could not take Entresto on losartan and metoprolol and low-salt diet fluid restriction symptoms seems to be controlled except of exertional dyspnea
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
## 2022-11-03 NOTE — ASSESSMENT & PLAN NOTE
Continue Claritin 1 a day for prevention maculopapular erythematous intermittent rash awaiting to be seen by dermatologist since the last visit only eating no rash

## 2022-11-03 NOTE — ASSESSMENT & PLAN NOTE
Followed by cardiology patient could not afford Entresto so patient is now on losartan and Toprol-XL patient is not volume overloaded no signs of CHF or exertional except exertional dyspnea continue fluid restriction low-salt diet Lasix CHF controlled daily weight monitoring

## 2022-12-27 DIAGNOSIS — F51.01 PRIMARY INSOMNIA: ICD-10-CM

## 2022-12-27 DIAGNOSIS — F41.9 ANXIETY: ICD-10-CM

## 2022-12-27 RX ORDER — ZOLPIDEM TARTRATE 10 MG/1
10 TABLET ORAL
Qty: 30 TABLET | Refills: 0 | Status: SHIPPED | OUTPATIENT
Start: 2022-12-27

## 2022-12-27 RX ORDER — ALPRAZOLAM 0.5 MG/1
0.5 TABLET ORAL 2 TIMES DAILY PRN
Qty: 30 TABLET | Refills: 0 | Status: SHIPPED | OUTPATIENT
Start: 2022-12-27

## 2022-12-27 NOTE — TELEPHONE ENCOUNTER
Pt has appt with new PCP at [facility] in 2 wks but ran out of meds in the meantime and requested refills

## 2023-01-31 ENCOUNTER — OFFICE VISIT (OUTPATIENT)
Dept: FAMILY MEDICINE CLINIC | Facility: CLINIC | Age: 53
End: 2023-01-31

## 2023-01-31 VITALS
WEIGHT: 132.06 LBS | HEIGHT: 67 IN | OXYGEN SATURATION: 99 % | DIASTOLIC BLOOD PRESSURE: 54 MMHG | HEART RATE: 89 BPM | RESPIRATION RATE: 21 BRPM | BODY MASS INDEX: 20.73 KG/M2 | SYSTOLIC BLOOD PRESSURE: 106 MMHG | TEMPERATURE: 98 F

## 2023-01-31 DIAGNOSIS — E03.9 HYPOTHYROIDISM, UNSPECIFIED TYPE: Primary | ICD-10-CM

## 2023-01-31 DIAGNOSIS — F41.9 ANXIETY: ICD-10-CM

## 2023-01-31 DIAGNOSIS — L30.9 ECZEMA, UNSPECIFIED TYPE: ICD-10-CM

## 2023-01-31 DIAGNOSIS — R82.90 ABNORMAL URINALYSIS: ICD-10-CM

## 2023-01-31 DIAGNOSIS — R76.8 FALSE POSITIVE SEROLOGICAL TEST FOR HEPATITIS C: ICD-10-CM

## 2023-01-31 DIAGNOSIS — R30.0 DYSURIA: ICD-10-CM

## 2023-01-31 DIAGNOSIS — F51.01 PRIMARY INSOMNIA: ICD-10-CM

## 2023-01-31 DIAGNOSIS — F32.A MODERATE DEPRESSIVE EPISODE: ICD-10-CM

## 2023-01-31 DIAGNOSIS — E78.5 HYPERLIPIDEMIA, UNSPECIFIED HYPERLIPIDEMIA TYPE: ICD-10-CM

## 2023-01-31 DIAGNOSIS — R53.83 OTHER FATIGUE: ICD-10-CM

## 2023-01-31 PROBLEM — L50.9 URTICARIA: Status: RESOLVED | Noted: 2022-10-06 | Resolved: 2023-01-31

## 2023-01-31 PROBLEM — R07.9 CHEST PAIN: Status: RESOLVED | Noted: 2020-07-27 | Resolved: 2023-01-31

## 2023-01-31 PROBLEM — R19.7 DIARRHEA: Status: RESOLVED | Noted: 2021-09-17 | Resolved: 2023-01-31

## 2023-01-31 LAB
SL AMB  POCT GLUCOSE, UA: NORMAL
SL AMB LEUKOCYTE ESTERASE,UA: 25
SL AMB POCT BILIRUBIN,UA: NORMAL
SL AMB POCT BLOOD,UA: 50
SL AMB POCT KETONES,UA: NORMAL
SL AMB POCT NITRITE,UA: NORMAL
SL AMB POCT PH,UA: 6
SL AMB POCT SPECIFIC GRAVITY,UA: 1.02
SL AMB POCT URINE PROTEIN: NORMAL
SL AMB POCT UROBILINOGEN: NORMAL

## 2023-01-31 RX ORDER — ALPRAZOLAM 0.5 MG/1
0.5 TABLET ORAL 2 TIMES DAILY PRN
Qty: 60 TABLET | Refills: 0 | Status: SHIPPED | OUTPATIENT
Start: 2023-01-31

## 2023-01-31 RX ORDER — ZOLPIDEM TARTRATE 10 MG/1
10 TABLET ORAL
Qty: 30 TABLET | Refills: 0 | Status: SHIPPED | OUTPATIENT
Start: 2023-01-31

## 2023-01-31 NOTE — ASSESSMENT & PLAN NOTE
Acute hepatitis panel positive for hepatitis C in 2018, viral DNA load was not detected on confirmatory testing    Patient would like to repeat to ensure she is negative for hepatitis C

## 2023-01-31 NOTE — ASSESSMENT & PLAN NOTE
No recent TSH - has not taken levothyroxine in 3 weeks   May be attributing to worsened anxiety and depression  Repeat TSH then prescribe dose appropriate dose

## 2023-01-31 NOTE — PROGRESS NOTES
Name: Alexandria Park      : 1970      MRN: [id number]  Encounter Provider: Alyson Sharif MD  Encounter Date: 2023   Encounter department: [facility]    Assessment & Plan     Problem List Items Addressed This Visit        Endocrine    Hypothyroidism - Primary     No recent TSH - has not taken levothyroxine in 3 weeks   May be attributing to worsened anxiety and depression  Repeat TSH then prescribe dose appropriate dose          Relevant Orders    TSH, 3rd generation       Other    Anxiety     History of anxiety taking Xanax 0 5 mg twice daily as well as Ambien 10 mg nightly  Controlled substance contract signed during today's visit  PDMP checked, refill provided  Anxiety is worsened recently due to 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  Interested in therapy, provided with outpatient resources and patient agreed to call and schedule appointment  We will hold off on any SSRI/SNRI until we check TSH as this can be attributing to patient's anxiety  Denies any suicidal ideation, self-harm, homicidal ideation         Relevant Medications    ALPRAZolam (XANAX) 0 5 mg tablet    False positive serological test for hepatitis C     Acute hepatitis panel positive for hepatitis C in 2018, viral DNA load was not detected on confirmatory testing    Patient would like to repeat to ensure she is negative for hepatitis C         Relevant Orders    Hepatitis panel, acute    Hyperlipidemia     On rosuvastatin daily, follow-up lipid panel         Relevant Orders    Lipid Panel with Direct LDL reflex    Moderate depressive episode     See assessment and plan for anxiety         Relevant Medications    zolpidem (AMBIEN) 10 mg tablet    ALPRAZolam (XANAX) 0 5 mg tablet    Other fatigue     Chronic ongoing fatigue and patient with history of heart disease as well as history of Hodgkin's lymphoma  Fatigue possibly secondary to heart disease, and related to low mood  Would like to evaluate for anemia, follow-up CBC, iron panel, B12, folate  Also assess thyroid function  Patient also reports symptoms of Raynaud's injunction with fatigue, therefore will evaluate for lupus         Relevant Orders    CBC and differential    Iron Panel (Includes Ferritin, Iron Sat%, Iron, and TIBC)    Vitamin B12    Folate    Comprehensive metabolic panel    YUMIKO Screen w/ Reflex to Titer/Pattern   Other Visit Diagnoses     Dysuria        Intermittent symptoms of dysuria, follow-up urine culture    Relevant Orders    POCT urine dip (Completed)    Eczema, unspecified type        Relevant Medications    triamcinolone (KENALOG) 0 1 % ointment    Primary insomnia        Relevant Medications    zolpidem (AMBIEN) 10 mg tablet    Abnormal urinalysis        Relevant Orders    Urine culture            Depression Screening and Follow-up Plan: Patient's depression screening was positive with a PHQ-2 score of 3  Their PHQ-9 score was 13  Patient assessed for underlying major depression  Brief counseling provided and recommend additional follow-up/re-evaluation next office visit  Provided with outpatient mental health therapy options, patient agreed to schedule appointment      Will address care gaps at next visit, as patient is new to practice and had many concerns for today's visit  Subjective      This is a very pleasant 47 y o  female who presents to the clinic as a new patient to establish care  She has a pmh of hypothyroidism, hepatitis C, CHF, dilated cardiomyopathy (2/2 to radiation to chest), dyslipidemia, SVT and s/p ablation  She follows regularly with cardiology and her next appointment is scheduled for April 2023  Cardiology provides patient with her aspirin, metoprolol, losartan and rosuvastatin  She also has a history of Hodgkin's lymphoma status post chemo and radiation therapy in the past   No more reoccurrence as per patient  Of note has had multiple labs ordered over the past 2 years and patient has not completed them  Patient does report a history of anxiety, and feels that it is worse recently as she is now the primary caregiver for her father  He was previously on comfort care, however did improve and now has most responsibilities to take care of him in comparison to her siblings  Patient's medical conditions are stable unless noted otherwise above  Patient has not had any recent hospitalizations, or medical emergencies since last visit  Patient has no further complaints other than what is mentioned in the ROS  PHQ-2/9 Depression Screening    Little interest or pleasure in doing things: 2 - more than half the days  Feeling down, depressed, or hopeless: 1 - several days  Trouble falling or staying asleep, or sleeping too much: 3 - nearly every day  Feeling tired or having little energy: 2 - more than half the days  Poor appetite or overeatin - more than half the days  Feeling bad about yourself - or that you are a failure or have let yourself or your family down: 0 - not at all  Trouble concentrating on things, such as reading the newspaper or watching television: 1 - several days  Moving or speaking so slowly that other people could have noticed  Or the opposite - being so fidgety or restless that you have been moving around a lot more than usual: 2 - more than half the days  Thoughts that you would be better off dead, or of hurting yourself in some way: 0 - not at all  PHQ-2 Score: 3  PHQ-2 Interpretation: POSITIVE depression screen  PHQ-9 Score: 13   PHQ-9 Interpretation: Moderate depression        RANCHO-7 Flowsheet Screening    Flowsheet Row Most Recent Value   Over the last 2 weeks, how often have you been bothered by any of the following problems?     Feeling nervous, anxious, or on edge 3   Not being able to stop or control worrying 3   Worrying too much about different things 3   Trouble relaxing 3   Being so restless that it is hard to sit still 2   Becoming easily annoyed or irritable 2   Feeling afraid as if something awful might happen 1   RANCHO-7 Total Score 17        Review of Systems   Constitutional: Positive for fatigue  Negative for activity change, appetite change, chills, diaphoresis, fever and unexpected weight change  HENT: Negative for congestion, dental problem, rhinorrhea, sinus pain and sore throat  Eyes: Negative for visual disturbance  Respiratory: Negative for cough, chest tightness, shortness of breath and wheezing  Cardiovascular: Negative for chest pain, palpitations and leg swelling  Gastrointestinal: Negative for abdominal pain, constipation, diarrhea, nausea and vomiting  Genitourinary: Positive for dysuria  Negative for difficulty urinating, frequency, hematuria and urgency  Musculoskeletal: Negative for arthralgias, back pain, myalgias and neck pain  Skin: Positive for color change (fingers turn white with well demarcated line) and rash (back of elbow, knees and neck )  Neurological: Negative for dizziness, weakness, light-headedness, numbness and headaches  Psychiatric/Behavioral: Positive for dysphoric mood and sleep disturbance  Negative for agitation, behavioral problems, decreased concentration, self-injury and suicidal ideas  The patient is nervous/anxious  Current Outpatient Medications on File Prior to Visit   Medication Sig   • aspirin 81 mg chewable tablet Chew 1 tablet (81 mg total) daily   • diphenhydrAMINE HCl (BENADRYL ALLERGY PO) Take by mouth   • ibuprofen (MOTRIN) 200 mg tablet Take by mouth every 6 (six) hours as needed for mild pain   • levothyroxine 25 mcg tablet Take 50 mcg by mouth daily    • losartan (COZAAR) 50 mg tablet Take 1 tablet (50 mg total) by mouth in the morning     • metoprolol succinate (TOPROL-XL) 100 mg 24 hr tablet take 1 tablet by mouth once daily   • metoprolol succinate (TOPROL-XL) 25 mg 24 hr tablet take 1 tablet by mouth once daily   • rosuvastatin (CRESTOR) 5 mg tablet take 1 tablet by mouth every other day   • [DISCONTINUED] ALPRAZolam (XANAX) 0 5 mg tablet Take 1 tablet (0 5 mg total) by mouth 2 (two) times a day as needed for anxiety   • [DISCONTINUED] zolpidem (AMBIEN) 10 mg tablet Take 1 tablet (10 mg total) by mouth daily at bedtime as needed for sleep       Objective     /54 (BP Location: Left arm, Patient Position: Sitting, Cuff Size: Standard)   Pulse 89   Temp 98 °F (36 7 °C) (Temporal)   Resp 21   Ht 5' 7" (1 702 m)   Wt 59 9 kg (132 lb 1 oz)   LMP  (LMP Unknown)   SpO2 99%   BMI 20 68 kg/m²       Physical Exam  Vitals and nursing note reviewed  Constitutional:       General: She is not in acute distress  Appearance: Normal appearance  She is well-developed and normal weight  She is not ill-appearing  HENT:      Head: Normocephalic and atraumatic  Right Ear: External ear normal       Left Ear: External ear normal       Nose: Nose normal  No congestion or rhinorrhea  Eyes:      Extraocular Movements: Extraocular movements intact  Conjunctiva/sclera: Conjunctivae normal    Cardiovascular:      Rate and Rhythm: Normal rate and regular rhythm  Pulses: Normal pulses  Heart sounds: Normal heart sounds  No murmur heard  Pulmonary:      Effort: Pulmonary effort is normal  No respiratory distress  Breath sounds: Normal breath sounds  No wheezing, rhonchi or rales  Abdominal:      General: There is no distension  Musculoskeletal:         General: No tenderness  Normal range of motion  Cervical back: Normal range of motion and neck supple  Skin:     General: Skin is warm and dry  Capillary Refill: Capillary refill takes less than 2 seconds  Findings: Rash ( Erythematous dry rough patches on popliteal fossa ) present  No erythema  Neurological:      Mental Status: She is alert and oriented to person, place, and time  Motor: No weakness        Gait: Gait normal    Psychiatric:         Attention and Perception: Attention normal          Mood and Affect: Mood is depressed           Speech: Speech normal          Behavior: Behavior normal           Berna Pino MD

## 2023-01-31 NOTE — ASSESSMENT & PLAN NOTE
History of anxiety taking Xanax 0 5 mg twice daily as well as Ambien 10 mg nightly  Controlled substance contract signed during today's visit  PDMP checked, refill provided  Anxiety is worsened recently due to 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  Interested in therapy, provided with outpatient resources and patient agreed to call and schedule appointment  We will hold off on any SSRI/SNRI until we check TSH as this can be attributing to patient's anxiety  Denies any suicidal ideation, self-harm, homicidal ideation

## 2023-01-31 NOTE — PATIENT INSTRUCTIONS
Please get your blood work completed asap and call to schedule a therapy apt from one of the locations on the form provided

## 2023-01-31 NOTE — ASSESSMENT & PLAN NOTE
Chronic ongoing fatigue and patient with history of heart disease as well as history of Hodgkin's lymphoma  Fatigue possibly secondary to heart disease, and related to low mood  Would like to evaluate for anemia, follow-up CBC, iron panel, B12, folate  Also assess thyroid function  Patient also reports symptoms of Raynaud's injunction with fatigue, therefore will evaluate for lupus

## 2023-02-02 LAB
BACTERIA UR CULT: NORMAL
Lab: NORMAL

## 2023-02-28 ENCOUNTER — OFFICE VISIT (OUTPATIENT)
Dept: FAMILY MEDICINE CLINIC | Facility: CLINIC | Age: 53
End: 2023-02-28

## 2023-02-28 VITALS
TEMPERATURE: 97.8 F | SYSTOLIC BLOOD PRESSURE: 104 MMHG | WEIGHT: 129.44 LBS | BODY MASS INDEX: 20.31 KG/M2 | RESPIRATION RATE: 21 BRPM | HEIGHT: 67 IN | HEART RATE: 88 BPM | DIASTOLIC BLOOD PRESSURE: 60 MMHG | OXYGEN SATURATION: 99 %

## 2023-02-28 DIAGNOSIS — R76.8 FALSE POSITIVE SEROLOGICAL TEST FOR HEPATITIS C: ICD-10-CM

## 2023-02-28 DIAGNOSIS — Z59.819 HOUSING INSTABILITY: ICD-10-CM

## 2023-02-28 DIAGNOSIS — F51.01 PRIMARY INSOMNIA: ICD-10-CM

## 2023-02-28 DIAGNOSIS — F32.A MODERATE DEPRESSIVE EPISODE: ICD-10-CM

## 2023-02-28 DIAGNOSIS — E78.5 HYPERLIPIDEMIA, UNSPECIFIED HYPERLIPIDEMIA TYPE: ICD-10-CM

## 2023-02-28 DIAGNOSIS — E03.9 HYPOTHYROIDISM, UNSPECIFIED TYPE: Primary | ICD-10-CM

## 2023-02-28 DIAGNOSIS — F41.9 ANXIETY: ICD-10-CM

## 2023-02-28 DIAGNOSIS — R53.83 OTHER FATIGUE: ICD-10-CM

## 2023-02-28 DIAGNOSIS — Z59.9 FINANCIAL DIFFICULTIES: ICD-10-CM

## 2023-02-28 DIAGNOSIS — Z12.31 BREAST CANCER SCREENING BY MAMMOGRAM: ICD-10-CM

## 2023-02-28 RX ORDER — VENLAFAXINE HYDROCHLORIDE 37.5 MG/1
37.5 CAPSULE, EXTENDED RELEASE ORAL
Qty: 30 CAPSULE | Refills: 5 | Status: SHIPPED | OUTPATIENT
Start: 2023-02-28

## 2023-02-28 RX ORDER — ZOLPIDEM TARTRATE 10 MG/1
10 TABLET ORAL
Qty: 30 TABLET | Refills: 0 | Status: SHIPPED | OUTPATIENT
Start: 2023-02-28

## 2023-02-28 RX ORDER — ALPRAZOLAM 0.5 MG/1
0.5 TABLET ORAL 2 TIMES DAILY PRN
Qty: 60 TABLET | Refills: 0 | Status: SHIPPED | OUTPATIENT
Start: 2023-02-28

## 2023-02-28 SDOH — ECONOMIC STABILITY - INCOME SECURITY: PROBLEM RELATED TO HOUSING AND ECONOMIC CIRCUMSTANCES, UNSPECIFIED: Z59.9

## 2023-02-28 SDOH — ECONOMIC STABILITY - HOUSING INSECURITY: HOUSING INSTABILITY UNSPECIFIED: Z59.819

## 2023-02-28 NOTE — ASSESSMENT & PLAN NOTE
History of anxiety taking Xanax 0 5 mg twice daily as well as Ambien 10 mg nightly  PDMP checked, refill provided  Anxiety is worsened recently due to 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  Last visit provided patient with outpatient resources and patient agreed to call and schedule appointment after provided form again  Discussed importance of checking thyroid function to ensure it is not contributing to mood   Patient agreeable to starting Effexor 37 5 mg daily (discussed side effects) and once tolerating this well at therapeutic dose would like to titrate off of xanax   Denies any suicidal ideation, self-harm, homicidal ideation

## 2023-02-28 NOTE — ASSESSMENT & PLAN NOTE
No recent TSH - has not taken levothyroxine in 7 weeks   May be attributing to worsened anxiety and depression  Repeat TSH then prescribe dose appropriate dose   Emphasized importance of getting blood work completed

## 2023-02-28 NOTE — ASSESSMENT & PLAN NOTE
Chronic ongoing fatigue and patient with history of heart disease as well as history of Hodgkin's lymphoma  Fatigue possibly secondary to heart disease, and related to low mood  Would like to evaluate for anemia, follow-up CBC, iron panel, B12, folate, TSH   Patient also reports symptoms of Raynaud's inconjunction with fatigue, therefore will evaluate for lupus  Emphasized the importance to patient of getting blood work completed so we can properly treat her

## 2023-02-28 NOTE — PROGRESS NOTES
Name: Alexandria Park      : 1970      MRN: [id number]  Encounter Provider: Alyson Sharif MD  Encounter Date: 2023   Encounter department: [facility]     1  Hypothyroidism, unspecified type  Assessment & Plan:  No recent TSH - has not taken levothyroxine in 7 weeks   May be attributing to worsened anxiety and depression  Repeat TSH then prescribe dose appropriate dose   Emphasized importance of getting blood work completed     Orders:  -     TSH, 3rd generation; Future  -     TSH, 3rd generation    2  Other fatigue  Assessment & Plan:  Chronic ongoing fatigue and patient with history of heart disease as well as history of Hodgkin's lymphoma  Fatigue possibly secondary to heart disease, and related to low mood  Would like to evaluate for anemia, follow-up CBC, iron panel, B12, folate, TSH   Patient also reports symptoms of Raynaud's inconjunction with fatigue, therefore will evaluate for lupus  Emphasized the importance to patient of getting blood work completed so we can properly treat her    Orders:  -     Folate; Future  -     CBC and differential; Future  -     Vitamin B12; Future  -     Iron Panel (Includes Ferritin, Iron Sat%, Iron, and TIBC); Future  -     YUMIKO Screen w/ Reflex to Titer/Pattern; Future  -     Folate  -     CBC and differential  -     Vitamin B12    3   Anxiety  Assessment & Plan:  History of anxiety taking Xanax 0 5 mg twice daily as well as Ambien 10 mg nightly  PDMP checked, refill provided  Anxiety is worsened recently due to 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  Last visit provided patient with outpatient resources and patient agreed to call and schedule appointment after provided form again  Discussed importance of checking thyroid function to ensure it is not contributing to mood   Patient agreeable to starting Effexor 37 5 mg daily (discussed side effects) and once tolerating this well at therapeutic dose would like to titrate off of xanax   Denies any suicidal ideation, self-harm, homicidal ideation    Orders:  -     venlafaxine (EFFEXOR-XR) 37 5 mg 24 hr capsule; Take 1 capsule (37 5 mg total) by mouth daily with breakfast  -     ALPRAZolam (XANAX) 0 5 mg tablet; Take 1 tablet (0 5 mg total) by mouth 2 (two) times a day as needed for anxiety    4  Moderate depressive episode  Assessment & Plan:  See assessment and plan for anxiety  Feeling very hopeless and lost all interest in things with decreased appetite  Denies SI/SH/HI - does rarely have a passive death wish but denies any plan or ever intend on commiting suicide  Orders:  -     venlafaxine (EFFEXOR-XR) 37 5 mg 24 hr capsule; Take 1 capsule (37 5 mg total) by mouth daily with breakfast    5  Breast cancer screening by mammogram  -     Mammo screening bilateral w 3d & cad; Future; Expected date: 02/28/2023    6  Hyperlipidemia, unspecified hyperlipidemia type  -     Lipid panel; Future  -     Comprehensive metabolic panel; Future  -     Lipid panel  -     Comprehensive metabolic panel    7  False positive serological test for hepatitis C  -     Hepatitis panel, acute; Future  -     Hepatitis panel, acute    8  Housing instability  -     Ambulatory Referral to [place] [id number]; Future    9  Financial difficulties  -     Ambulatory Referral to Social [facility] Care [facility] Program; Future    10  Primary insomnia  -     zolpidem (AMBIEN) 10 mg tablet; Take 1 tablet (10 mg total) by mouth daily at bedtime as needed for sleep       Will have patient return to clinic for cervical cancer screening with a pap smear  Subjective      This is a very pleasant 47 y o  female who presents to the clinic for follow up on anxiety and depression  She has a pmh of hypothyroidism, hepatitis C, CHF, dilated cardiomyopathy (2/2 to radiation to chest), dyslipidemia, SVT and s/p ablation  She follows regularly with cardiology and her next appointment is scheduled for April 2023  Cardiology provides patient with her aspirin, metoprolol, losartan and rosuvastatin  She also has a history of Hodgkin's lymphoma status post chemo and radiation therapy in the past   No more reoccurrence as per patient  Of note has had multiple labs ordered over the past 2 years and patient has not completed them  Additionally patient has not completed the blood work we ordered last visit  Patient does report a history of anxiety, and feels that it is worse recently as she is now the primary caregiver for her father  He was previously on comfort care, however did improve and now has most responsibilities to take care of him in comparison to her siblings  Now reports she lost her job and is possibly facing eviction from housing due to this  She feels restless and not sleeping well, Bon[place]e Corns does help but overall only getting about 5 hrs a night  She is constantly worrying  When she feels overwhelmed she does get palpitations and shortness of breath  Patient's medical conditions are stable unless noted otherwise above  Patient has not had any recent hospitalizations, or medical emergencies since last visit  Patient has no further complaints other than what is mentioned in the ROS  Review of Systems   Constitutional: Positive for fatigue  Negative for activity change, appetite change, chills, diaphoresis, fever and unexpected weight change  HENT: Negative for congestion, dental problem, rhinorrhea, sinus pain and sore throat  Eyes: Negative for visual disturbance  Respiratory: Negative for cough, chest tightness, shortness of breath and wheezing  Cardiovascular: Negative for chest pain, palpitations and leg swelling  Gastrointestinal: Negative for abdominal pain, constipation, diarrhea, nausea and vomiting  Genitourinary: Negative for difficulty urinating, dysuria, frequency, hematuria and urgency  Musculoskeletal: Negative for arthralgias, back pain, myalgias and neck pain     Skin: Positive for color change (fingers turn white with well demarcated line)  Neurological: Negative for dizziness, weakness, light-headedness, numbness and headaches  Psychiatric/Behavioral: Positive for dysphoric mood and sleep disturbance  Negative for agitation, behavioral problems, decreased concentration, self-injury and suicidal ideas  The patient is nervous/anxious  Current Outpatient Medications on File Prior to Visit   Medication Sig   • aspirin 81 mg chewable tablet Chew 1 tablet (81 mg total) daily   • diphenhydrAMINE HCl (BENADRYL ALLERGY PO) Take by mouth   • ibuprofen (MOTRIN) 200 mg tablet Take by mouth every 6 (six) hours as needed for mild pain   • levothyroxine 25 mcg tablet Take 50 mcg by mouth daily    • losartan (COZAAR) 50 mg tablet Take 1 tablet (50 mg total) by mouth in the morning  • metoprolol succinate (TOPROL-XL) 100 mg 24 hr tablet take 1 tablet by mouth once daily   • metoprolol succinate (TOPROL-XL) 25 mg 24 hr tablet take 1 tablet by mouth once daily   • rosuvastatin (CRESTOR) 5 mg tablet take 1 tablet by mouth every other day   • triamcinolone (KENALOG) 0 1 % ointment Apply topically 2 (two) times a day   • [DISCONTINUED] ALPRAZolam (XANAX) 0 5 mg tablet Take 1 tablet (0 5 mg total) by mouth 2 (two) times a day as needed for anxiety   • [DISCONTINUED] zolpidem (AMBIEN) 10 mg tablet Take 1 tablet (10 mg total) by mouth daily at bedtime as needed for sleep       Objective     /60 (BP Location: Left arm, Patient Position: Sitting, Cuff Size: Standard)   Pulse 88   Temp 97 8 °F (36 6 °C) (Temporal)   Resp 21   Ht 5' 7" (1 702 m)   Wt 58 7 kg (129 lb 7 oz)   LMP  (LMP Unknown)   SpO2 99%   BMI 20 27 kg/m²     Physical Exam  Vitals and nursing note reviewed  Constitutional:       General: She is not in acute distress  Appearance: Normal appearance  She is well-developed and normal weight  She is not ill-appearing  HENT:      Head: Normocephalic and atraumatic  Right Ear: External ear normal       Left Ear: External ear normal       Nose: Nose normal  No congestion or rhinorrhea  Eyes:      Extraocular Movements: Extraocular movements intact  Conjunctiva/sclera: Conjunctivae normal    Cardiovascular:      Rate and Rhythm: Normal rate and regular rhythm  Pulses: Normal pulses  Heart sounds: Normal heart sounds  No murmur heard  Pulmonary:      Effort: Pulmonary effort is normal  No respiratory distress  Breath sounds: Normal breath sounds  No wheezing, rhonchi or rales  Abdominal:      General: There is no distension  Musculoskeletal:         General: No tenderness  Normal range of motion  Cervical back: Normal range of motion and neck supple  Skin:     General: Skin is warm and dry  Capillary Refill: Capillary refill takes less than 2 seconds  Findings: No erythema  Neurological:      Mental Status: She is alert and oriented to person, place, and time  Motor: No weakness  Gait: Gait normal    Psychiatric:         Attention and Perception: Attention normal          Mood and Affect: Mood is depressed           Speech: Speech normal          Behavior: Behavior normal        Trudi Strong MD

## 2023-03-01 ENCOUNTER — PATIENT OUTREACH (OUTPATIENT)
Dept: FAMILY MEDICINE CLINIC | Facility: CLINIC | Age: 53
End: 2023-03-01

## 2023-03-01 NOTE — PROGRESS NOTES
SWCM had received a referral from Berna Pino MD r/t housing instability and financial difficulties  Per chart, patient reports hx of anxiety and depression  Per chart, patient reports it is getting worse now that she is primary caregiver for her father  Per chart, patient lost her job and is facing eviction from housing due to this  SWCM had called the patient via phone  SWCM had introduced herself and reason for consult  SWCM asked patient how she is doing  Patient stated she is doing well, very stressed out  SWCM asked the patient what is doing on with her current housing situation  Patient stated the landlord had filed for eviction as he is selling the home  Patient stated she has spoke with a  and is trying to fight the eviction  Patient stated she papers were filed on 2/17 and needs to be out by 4/1  Patient stated she is staying with her father  Patient stated her father has been in and out of hospital for 2-3 months  Patient stated he is currently there but will be discharged home with [facility]  Patient stated she is concern as they also have a dog and cat to worry about  SWCM asked the patient if she spoke with the hospital about placing her father into a [facility] for STR or LTC  Patient stated she might have to consider that if she cannot figure out what they will do in terms of a home  SW offered to have her [facility] assist with rental assistance programs and housing such as section 8 and public housing waitlist  Patient stated she has been on section 8 waitlist for 2 years  Patient stated she knows she can reach out to [facility] as they can place them into a shelter or hotel  SWCM also informed the patient that [facility] have been able to put people into homes  Patient stated she is going to speak with her siblings  Patient stated her siblings are her support system  Patient stated she knows that they could stay with one of her siblings  Patient stated it is a lot to process and needs some time  Patient denied any assistance at this time  Patient stated she would outreach [facility] if she needs help  SWCM agreed  SWCM asked patient about her financial issues  Patient stated she is in the process of getting her SS  Patient stated her father has SS and pension so they are using that but it is not enough if they found a place to move into either  Patient denied any transportation and food insecurity at this time  Patient denied any other needs at this time  SWCM provided her contact information  SWCM advised patient reach out if she needs any help  Patient agreed  Patient denied any continued SW outreach  SWCM closed referral  Please reconsult SW for future needs

## 2023-03-29 LAB
ALBUMIN SERPL-MCNC: 5 G/DL (ref 3.8–4.9)
ALBUMIN/GLOB SERPL: 2.1 {RATIO} (ref 1.2–2.2)
ALP SERPL-CCNC: 66 IU/L (ref 44–121)
ALT SERPL-CCNC: 16 IU/L (ref 0–32)
ANA TITR SER IF: NEGATIVE {TITER}
AST SERPL-CCNC: 30 IU/L (ref 0–40)
BASOPHILS # BLD AUTO: 0 X10E3/UL (ref 0–0.2)
BASOPHILS NFR BLD AUTO: 1 %
BILIRUB SERPL-MCNC: 0.4 MG/DL (ref 0–1.2)
BUN SERPL-MCNC: 19 MG/DL (ref 6–24)
BUN/CREAT SERPL: 24 (ref 9–23)
CALCIUM SERPL-MCNC: 9.6 MG/DL (ref 8.7–10.2)
CHLORIDE SERPL-SCNC: 101 MMOL/L (ref 96–106)
CHOLEST SERPL-MCNC: 201 MG/DL (ref 100–199)
CHOLEST/HDLC SERPL: 2.1 RATIO (ref 0–4.4)
CO2 SERPL-SCNC: 24 MMOL/L (ref 20–29)
CREAT SERPL-MCNC: 0.78 MG/DL (ref 0.57–1)
DIAGNOSTIC IMP SPEC-IMP: NORMAL
EGFR: 91 ML/MIN/1.73
EOSINOPHIL # BLD AUTO: 0.1 X10E3/UL (ref 0–0.4)
EOSINOPHIL NFR BLD AUTO: 3 %
ERYTHROCYTE [DISTWIDTH] IN BLOOD BY AUTOMATED COUNT: 12.3 % (ref 11.7–15.4)
FERRITIN SERPL-MCNC: 126 NG/ML (ref 15–150)
FOLATE SERPL-MCNC: 6.3 NG/ML
GLOBULIN SER-MCNC: 2.4 G/DL (ref 1.5–4.5)
GLUCOSE SERPL-MCNC: 112 MG/DL (ref 70–99)
HAV IGM SERPL QL IA: NEGATIVE
HBV CORE IGM SERPL QL IA: NEGATIVE
HBV SURFACE AG SERPL QL IA: NEGATIVE
HCT VFR BLD AUTO: 39.1 % (ref 34–46.6)
HCV AB S/CO SERPL IA: REACTIVE
HCV RNA # SERPL NAA+PROBE: NORMAL IU/ML
HDLC SERPL-MCNC: 97 MG/DL
HGB BLD-MCNC: 13.6 G/DL (ref 11.1–15.9)
IMM GRANULOCYTES # BLD: 0 X10E3/UL (ref 0–0.1)
IMM GRANULOCYTES NFR BLD: 0 %
IRON SATN MFR SERPL: 29 % (ref 15–55)
IRON SERPL-MCNC: 114 UG/DL (ref 27–159)
LDLC SERPL CALC-MCNC: 90 MG/DL (ref 0–99)
LYMPHOCYTES # BLD AUTO: 0.6 X10E3/UL (ref 0.7–3.1)
LYMPHOCYTES NFR BLD AUTO: 17 %
MCH RBC QN AUTO: 31.9 PG (ref 26.6–33)
MCHC RBC AUTO-ENTMCNC: 34.8 G/DL (ref 31.5–35.7)
MCV RBC AUTO: 92 FL (ref 79–97)
MONOCYTES # BLD AUTO: 0.4 X10E3/UL (ref 0.1–0.9)
MONOCYTES NFR BLD AUTO: 11 %
NEUTROPHILS # BLD AUTO: 2.6 X10E3/UL (ref 1.4–7)
NEUTROPHILS NFR BLD AUTO: 68 %
PLATELET # BLD AUTO: 215 X10E3/UL (ref 150–450)
POTASSIUM SERPL-SCNC: 4.5 MMOL/L (ref 3.5–5.2)
PROT SERPL-MCNC: 7.4 G/DL (ref 6–8.5)
RBC # BLD AUTO: 4.26 X10E6/UL (ref 3.77–5.28)
SL AMB VLDL CHOLESTEROL CALC: 14 MG/DL (ref 5–40)
SODIUM SERPL-SCNC: 139 MMOL/L (ref 134–144)
TEST INFORMATION: NORMAL
TIBC SERPL-MCNC: 400 UG/DL (ref 250–450)
TRIGL SERPL-MCNC: 78 MG/DL (ref 0–149)
TSH SERPL DL<=0.005 MIU/L-ACNC: 6.65 UIU/ML (ref 0.45–4.5)
UIBC SERPL-MCNC: 286 UG/DL (ref 131–425)
VIT B12 SERPL-MCNC: 137 PG/ML (ref 232–1245)
WBC # BLD AUTO: 3.9 X10E3/UL (ref 3.4–10.8)

## 2023-03-31 ENCOUNTER — OFFICE VISIT (OUTPATIENT)
Dept: FAMILY MEDICINE CLINIC | Facility: CLINIC | Age: 53
End: 2023-03-31

## 2023-03-31 VITALS
RESPIRATION RATE: 21 BRPM | DIASTOLIC BLOOD PRESSURE: 60 MMHG | TEMPERATURE: 98.4 F | HEART RATE: 83 BPM | SYSTOLIC BLOOD PRESSURE: 104 MMHG | WEIGHT: 129.06 LBS | BODY MASS INDEX: 19.56 KG/M2 | OXYGEN SATURATION: 99 % | HEIGHT: 68 IN

## 2023-03-31 DIAGNOSIS — R76.8 HEPATITIS C ANTIBODY TEST POSITIVE: ICD-10-CM

## 2023-03-31 DIAGNOSIS — F32.A MODERATE DEPRESSIVE EPISODE: ICD-10-CM

## 2023-03-31 DIAGNOSIS — F41.9 ANXIETY: ICD-10-CM

## 2023-03-31 DIAGNOSIS — R53.83 OTHER FATIGUE: ICD-10-CM

## 2023-03-31 DIAGNOSIS — E03.9 HYPOTHYROIDISM, UNSPECIFIED TYPE: ICD-10-CM

## 2023-03-31 DIAGNOSIS — F51.01 PRIMARY INSOMNIA: ICD-10-CM

## 2023-03-31 DIAGNOSIS — E53.8 VITAMIN B 12 DEFICIENCY: Primary | ICD-10-CM

## 2023-03-31 RX ORDER — ALPRAZOLAM 0.5 MG/1
0.5 TABLET ORAL 2 TIMES DAILY PRN
Qty: 60 TABLET | Refills: 0 | Status: SHIPPED | OUTPATIENT
Start: 2023-03-31

## 2023-03-31 RX ORDER — CYANOCOBALAMIN (VITAMIN B-12) 2000 MCG
2000 TABLET ORAL DAILY
Qty: 30 TABLET | Refills: 2 | Status: SHIPPED | OUTPATIENT
Start: 2023-03-31

## 2023-03-31 RX ORDER — ZOLPIDEM TARTRATE 10 MG/1
10 TABLET ORAL
Qty: 30 TABLET | Refills: 0 | Status: SHIPPED | OUTPATIENT
Start: 2023-03-31

## 2023-03-31 RX ORDER — LEVOTHYROXINE SODIUM 0.03 MG/1
50 TABLET ORAL DAILY
Qty: 30 TABLET | Refills: 2 | Status: SHIPPED | OUTPATIENT
Start: 2023-03-31 | End: 2023-03-31

## 2023-03-31 RX ORDER — LEVOTHYROXINE SODIUM 0.03 MG/1
25 TABLET ORAL DAILY
Qty: 30 TABLET | Refills: 2 | Status: SHIPPED | OUTPATIENT
Start: 2023-03-31

## 2023-03-31 RX ORDER — VENLAFAXINE HYDROCHLORIDE 37.5 MG/1
37.5 CAPSULE, EXTENDED RELEASE ORAL
Qty: 30 CAPSULE | Refills: 2 | Status: SHIPPED | OUTPATIENT
Start: 2023-03-31

## 2023-03-31 RX ORDER — CYANOCOBALAMIN 1000 UG/ML
1000 INJECTION, SOLUTION INTRAMUSCULAR; SUBCUTANEOUS ONCE
Status: COMPLETED | OUTPATIENT
Start: 2023-03-31 | End: 2023-03-31

## 2023-03-31 RX ADMIN — CYANOCOBALAMIN 1000 MCG: 1000 INJECTION, SOLUTION INTRAMUSCULAR; SUBCUTANEOUS at 08:49

## 2023-03-31 NOTE — ASSESSMENT & PLAN NOTE
History of anxiety taking Xanax 0 5 mg twice daily as well as Ambien 10 mg nightly  PDMP checked, refill provided  Patient is interested in starting did cut back on these medications over the next couple months  We will continue current dose at this time though within the next 2 to 3 months we did discuss a taper of Xanax  Patient endorses there are days she does not take her Xanax at all, though she still worries about any withdrawal symptoms within the titration period  Hypothyroid untreated may have been contributing to symptoms, see hypothyroid for that treatment plan  Patient doing well on Effexor 37 5 mg daily, no need to increase dose at this time  We will follow-up in 2 to 3 months    Denies any suicidal ideation, self-harm, homicidal ideation

## 2023-03-31 NOTE — ASSESSMENT & PLAN NOTE
See assessment and plan for anxiety  PHQ 7, much improved from last visit  Mood is much better, and she has less endorsed depressive days    Denies SI/SH/HI

## 2023-03-31 NOTE — PROGRESS NOTES
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  We will give 1 intramuscular injection of 1000 mcg today, and will have patient continue on 2000 mcg oral vitamin B12 supplement daily  Orders:  -     cyanocobalamin injection 1,000 mcg  -     vitamin B-12 (CYANOCOBALAMIN) 2000 MCG TABS; Take 1 tablet (2,000 mcg total) by mouth daily    2  Hypothyroidism, unspecified type  Assessment & Plan:  Previously on levothyroxine 50 mcg daily; has not taken levothyroxine in about 12 weeks   TSH recently mildly elevated  We will resume levothyroxine at 25 mcg daily and recheck in 6 to 8 weeks    Orders:  -     levothyroxine 25 mcg tablet; Take 1 tablet (25 mcg total) by mouth daily  -     TSH, 3rd generation; Future; Expected date: 2023  -     TSH, 3rd generation    3  Anxiety  Assessment & Plan:  History of anxiety taking Xanax 0 5 mg twice daily as well as Ambien 10 mg nightly  PDMP checked, refill provided  Patient is interested in starting did cut back on these medications over the next couple months  We will continue current dose at this time though within the next 2 to 3 months we did discuss a taper of Xanax  Patient endorses there are days she does not take her Xanax at all, though she still worries about any withdrawal symptoms within the titration period  Hypothyroid untreated may have been contributing to symptoms, see hypothyroid for that treatment plan  Patient doing well on Effexor 37 5 mg daily, no need to increase dose at this time    We will follow-up in 2 to 3 months  Denies any suicidal ideation, self-harm, homicidal ideation    Orders:  -     venlafaxine (EFFEXOR-XR) 37 5 mg 24 hr capsule; Take 1 capsule (37 5 mg total) by mouth daily with breakfast  -     ALPRAZolam (XANAX) 0 5 mg tablet; Take 1 tablet (0 5 mg total) by mouth 2 (two) times a day as needed for anxiety    4  Moderate depressive episode  Assessment & Plan:  See assessment and plan for anxiety  PHQ 7, much improved from last visit  Mood is much better, and she has less endorsed depressive days  Denies SI/SH/HI    Orders:  -     venlafaxine (EFFEXOR-XR) 37 5 mg 24 hr capsule; Take 1 capsule (37 5 mg total) by mouth daily with breakfast    5  Primary insomnia  -     zolpidem (AMBIEN) 10 mg tablet; Take 1 tablet (10 mg total) by mouth daily at bedtime as needed for sleep    6  Hepatitis C antibody test positive  Assessment & Plan:  Acute hepatitis panel positive for hepatitis C in 2018, viral RNA load was not detected on confirmatory testing  Same results on recent testing  Likely consistent with previous hepatitis C infection with no active infection given 2 rounds of the same test results  False positive less likely  No further action required at this time  7  Other fatigue  Assessment & Plan:  Chronic ongoing fatigue and patient with history of heart disease as well as history of Hodgkin's lymphoma  Fatigue improved from last visit   Likely secondary to depressed mood, hypothyroid and vit b 12 deficiency   See other problems for plan    Lupus screen negative          Patient agreeable to schedule her Pap smear with her next visit  Subjective      This is a very pleasant 47 y o  female who presents to the clinic for follow up on anxiety and depression as well as blood work follow-up  She has a pmh of hypothyroidism, hepatitis C, CHF, dilated cardiomyopathy (2/2 to radiation to chest), dyslipidemia, SVT and s/p ablation   She follows regularly with cardiology and her next appointment is scheduled for 2023  Cardiology provides patient with her aspirin, metoprolol, losartan and rosuvastatin  Recent lipid panel shows improvement of cholesterol levels  She also has a history of Hodgkin's lymphoma status post chemo and radiation therapy in the past   No more reoccurrence as per patient  Reports her anxiety depression is much improved since starting Effexor  Additionally her father is no longer in a nursing home, and was discharged to home  However she still is primary caregiver and has minimal time to care for herself  Overall she does feel much better though  Patient's medical conditions are stable unless noted otherwise above  Patient has not had any recent hospitalizations, or medical emergencies since last visit  Patient has no further complaints other than what is mentioned in the ROS  PHQ-9 Depression Screening    Little interest or pleasure in doing things: 1 - several days  Feeling down, depressed, or hopeless: 1 - several days  Trouble falling or staying asleep, or sleeping too much: 1 - several days  Feeling tired or having little energy: 2 - more than half the days  Poor appetite or overeatin - several days  Feeling bad about yourself - or that you are a failure or have let yourself or your family down: 1 - several days  Trouble concentrating on things, such as reading the newspaper or watching television: 0 - not at all  Moving or speaking so slowly that other people could have noticed  Or the opposite - being so fidgety or restless that you have been moving around a lot more than usual: 0 - not at all  Thoughts that you would be better off dead, or of hurting yourself in some way: 0 - not at all  PHQ-9 Score: 7  Score Interpretation: Mild depression       RANCHO-7 Flowsheet Screening    Flowsheet Row Most Recent Value   Over the last 2 weeks, how often have you been bothered by any of the following problems?     Feeling nervous, anxious, or on edge 2   Not being able to stop or control worrying 1   Worrying too much about different things 1   Trouble relaxing 2   Being so restless that it is hard to sit still 2   Becoming easily annoyed or irritable 2   Feeling afraid as if something awful might happen 2   RANCHO-7 Total Score 12              Review of Systems   Constitutional: Positive for fatigue ( Slightly improved)  Negative for activity change, appetite change, chills, diaphoresis, fever and unexpected weight change  HENT: Negative for congestion, dental problem, rhinorrhea, sinus pain and sore throat  Eyes: Negative for visual disturbance  Respiratory: Negative for cough, chest tightness, shortness of breath and wheezing  Cardiovascular: Negative for chest pain, palpitations and leg swelling  Gastrointestinal: Negative for abdominal pain, constipation, diarrhea, nausea and vomiting  Genitourinary: Negative for difficulty urinating, dysuria, frequency, hematuria and urgency  Musculoskeletal: Negative for arthralgias, back pain, myalgias and neck pain  Skin: Positive for color change (fingers turn white with well demarcated line)  Neurological: Negative for dizziness, weakness, light-headedness, numbness and headaches  Psychiatric/Behavioral: Positive for dysphoric mood ( Improving)  Negative for agitation, behavioral problems, decreased concentration, self-injury, sleep disturbance and suicidal ideas  The patient is nervous/anxious ( Improving)  Current Outpatient Medications on File Prior to Visit   Medication Sig   • aspirin 81 mg chewable tablet Chew 1 tablet (81 mg total) daily   • diphenhydrAMINE HCl (BENADRYL ALLERGY PO) Take by mouth   • ibuprofen (MOTRIN) 200 mg tablet Take by mouth every 6 (six) hours as needed for mild pain   • losartan (COZAAR) 50 mg tablet Take 1 tablet (50 mg total) by mouth in the morning     • metoprolol succinate (TOPROL-XL) 100 mg 24 hr tablet take 1 tablet by mouth once daily   • metoprolol succinate "(TOPROL-XL) 25 mg 24 hr tablet take 1 tablet by mouth once daily   • rosuvastatin (CRESTOR) 5 mg tablet take 1 tablet by mouth every other day   • [DISCONTINUED] ALPRAZolam (XANAX) 0 5 mg tablet Take 1 tablet (0 5 mg total) by mouth 2 (two) times a day as needed for anxiety   • [DISCONTINUED] venlafaxine (EFFEXOR-XR) 37 5 mg 24 hr capsule Take 1 capsule (37 5 mg total) by mouth daily with breakfast   • [DISCONTINUED] zolpidem (AMBIEN) 10 mg tablet Take 1 tablet (10 mg total) by mouth daily at bedtime as needed for sleep   • triamcinolone (KENALOG) 0 1 % ointment Apply topically 2 (two) times a day (Patient not taking: Reported on 3/31/2023)   • [DISCONTINUED] levothyroxine 25 mcg tablet Take 50 mcg by mouth daily  (Patient not taking: Reported on 3/31/2023)       Objective     /60 (BP Location: Left arm, Patient Position: Sitting, Cuff Size: Standard)   Pulse 83   Temp 98 4 °F (36 9 °C) (Temporal)   Resp 21   Ht 5' 8\" (1 727 m)   Wt 58 5 kg (129 lb 1 oz)   LMP  (LMP Unknown)   SpO2 99%   BMI 19 62 kg/m²     Physical Exam  Vitals and nursing note reviewed  Constitutional:       General: She is not in acute distress  Appearance: Normal appearance  She is well-developed and normal weight  She is not ill-appearing  HENT:      Head: Normocephalic and atraumatic  Right Ear: External ear normal       Left Ear: External ear normal       Nose: Nose normal  No congestion or rhinorrhea  Eyes:      Extraocular Movements: Extraocular movements intact  Conjunctiva/sclera: Conjunctivae normal    Cardiovascular:      Rate and Rhythm: Normal rate and regular rhythm  Pulses: Normal pulses  Heart sounds: Normal heart sounds  No murmur heard  Pulmonary:      Effort: Pulmonary effort is normal  No respiratory distress  Breath sounds: Normal breath sounds  No wheezing, rhonchi or rales  Abdominal:      General: There is no distension  Musculoskeletal:         General: No tenderness   " Normal range of motion  Cervical back: Normal range of motion and neck supple  Skin:     General: Skin is warm and dry  Capillary Refill: Capillary refill takes less than 2 seconds  Findings: No erythema  Neurological:      Mental Status: She is alert and oriented to person, place, and time  Motor: No weakness  Gait: Gait normal    Psychiatric:         Attention and Perception: Attention normal          Mood and Affect: Mood normal  Mood is not anxious or depressed           Speech: Speech normal          Behavior: Behavior normal             Tiffany Rivera MD

## 2023-03-31 NOTE — ASSESSMENT & PLAN NOTE
Previously on levothyroxine 50 mcg daily; has not taken levothyroxine in about 12 weeks   TSH recently mildly elevated  We will resume levothyroxine at 25 mcg daily and recheck in 6 to 8 weeks

## 2023-03-31 NOTE — ASSESSMENT & PLAN NOTE
Chronic ongoing fatigue and patient with history of heart disease as well as history of Hodgkin's lymphoma  Fatigue improved from last visit   Likely secondary to depressed mood, hypothyroid and vit b 12 deficiency   See other problems for plan    Lupus screen negative

## 2023-03-31 NOTE — PATIENT INSTRUCTIONS
Please call and schedule pap smear within 2 months  Thank you! Vitamin B12 Deficiency   WHAT YOU NEED TO KNOW:   Vitamin B12 deficiency is a low level of vitamin B12 in your body  Vitamin B12 is only found in foods that come from animal sources such as fish, beef, dairy products, and eggs  Vitamin B12 deficiency should be treated as early as possible  Without treatment, it can cause permanent nerve damage over time  DISCHARGE INSTRUCTIONS:   Call your doctor or dietitian if:   You continue to have symptoms, or your symptoms get worse  You have questions or concerns about your condition or care  Medicines:   Vitamin B12 supplements  may be needed to increase your levels back to normal     Take your medicine as directed  Contact your healthcare provider if you think your medicine is not helping or if you have side effects  Tell your provider if you are allergic to any medicine  Keep a list of the medicines, vitamins, and herbs you take  Include the amounts, and when and why you take them  Bring the list or the pill bottles to follow-up visits  Carry your medicine list with you in case of an emergency      Good sources of vitamin B12:       3 ounces of cooked clams, 84 1 mcg    3 ounces of cooked beef liver, 70 7 mcg    Fortified breakfast cereals, 1 5 to 6 mcg per serving    3 ounces of salmon, rainbow trout, or canned tuna fish, 2 5 to 4 8 mcg    3 ounces of top sirloin beef, 1 4 mcg    1 cup of milk or yogurt, 1 1 to 1 2 mcg    1 cup of a soy milk product, 0 9 to 3 3 mcg    1 ounce of a meat substitute, 0 5 to 1 2 mcg    1 ounce Swiss cheese, 0 9 mcg    1 large egg, 0 6 mcg  Amount of vitamin B12 you need each day:   Infants 0 to 12 months: 0 4 micrograms (mcg) to 0 5 mcg    Children 1 to 3 years: 0 9 mcg    Children 4 to 8 years: 1 2 mcg    Children 9 to 13 years: 1 8 mcg    Children over 14 years and adults: 1 8 mcg    Pregnant women and adolescents (over 14 years): 2 6 mcg    Breastfeeding women and adolescents (over 14 years): 2 8 mcg    Follow up with your doctor or dietitian as directed:  Write down your questions so you remember to ask them during your visits  © Copyright [employer] 2022 Information is for End User's use only and may not be sold, redistributed or otherwise used for commercial purposes  The above information is an  only  It is not intended as medical advice for individual conditions or treatments  Talk to your doctor, nurse or pharmacist before following any medical regimen to see if it is safe and effective for you

## 2023-03-31 NOTE — ASSESSMENT & PLAN NOTE
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

## 2023-03-31 NOTE — ASSESSMENT & PLAN NOTE
Acute hepatitis panel positive for hepatitis C in 2018, viral RNA load was not detected on confirmatory testing  Same results on recent testing  Likely consistent with previous hepatitis C infection with no active infection given 2 rounds of the same test results  False positive less likely  No further action required at this time

## 2023-04-28 DIAGNOSIS — F41.9 ANXIETY: ICD-10-CM

## 2023-04-28 DIAGNOSIS — F51.01 PRIMARY INSOMNIA: ICD-10-CM

## 2023-05-01 RX ORDER — ALPRAZOLAM 0.5 MG/1
0.5 TABLET ORAL 2 TIMES DAILY PRN
Qty: 60 TABLET | Refills: 0 | Status: SHIPPED | OUTPATIENT
Start: 2023-05-01

## 2023-05-01 RX ORDER — ZOLPIDEM TARTRATE 10 MG/1
10 TABLET ORAL
Qty: 30 TABLET | Refills: 0 | Status: SHIPPED | OUTPATIENT
Start: 2023-05-01

## 2023-05-01 NOTE — TELEPHONE ENCOUNTER
Patient called for update on rx  She said she is completely out of meds  Sending TT to Dr Kelin Shen

## 2023-05-04 ENCOUNTER — HOSPITAL ENCOUNTER (OUTPATIENT)
Dept: NON INVASIVE DIAGNOSTICS | Facility: HOSPITAL | Age: 53
Discharge: HOME | End: 2023-05-04
Attending: INTERNAL MEDICINE

## 2023-05-04 DIAGNOSIS — R00.0 TACHYCARDIA: ICD-10-CM

## 2023-05-04 RX ORDER — METOPROLOL SUCCINATE 25 MG/1
TABLET, EXTENDED RELEASE ORAL
Qty: 90 TABLET | Refills: 3 | Status: SHIPPED | OUTPATIENT
Start: 2023-05-04

## 2023-05-29 ENCOUNTER — HOSPITAL ENCOUNTER (EMERGENCY)
Facility: HOSPITAL | Age: 53
Discharge: HOME | End: 2023-05-29
Attending: EMERGENCY MEDICINE

## 2023-05-29 VITALS
SYSTOLIC BLOOD PRESSURE: 103 MMHG | OXYGEN SATURATION: 98 % | RESPIRATION RATE: 20 BRPM | HEART RATE: 105 BPM | TEMPERATURE: 98.4 F | DIASTOLIC BLOOD PRESSURE: 58 MMHG

## 2023-05-29 DIAGNOSIS — R04.0 EPISTAXIS: Primary | ICD-10-CM

## 2023-05-29 RX ORDER — OXYMETAZOLINE HYDROCHLORIDE 0.05 G/100ML
2 SPRAY NASAL 2 TIMES DAILY
Qty: 30 ML | Refills: 0 | Status: SHIPPED | OUTPATIENT
Start: 2023-05-29

## 2023-05-29 RX ORDER — OXYMETAZOLINE HYDROCHLORIDE 0.05 G/100ML
2 SPRAY NASAL ONCE
Status: COMPLETED | OUTPATIENT
Start: 2023-05-29 | End: 2023-05-29

## 2023-05-29 RX ADMIN — OXYMETAZOLINE HYDROCHLORIDE 2 SPRAY: 0.05 SPRAY NASAL at 19:12
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
## 2023-05-29 NOTE — DISCHARGE INSTRUCTIONS
Use the prescribed Afrin in both nostrils then pinch your nose for approximately 15 to 20 minutes if you continue to have bleeding  Tilt your head forward    If you continue to have bleeding after this, return to the hospital

## 2023-05-30 DIAGNOSIS — F51.01 PRIMARY INSOMNIA: ICD-10-CM

## 2023-05-30 DIAGNOSIS — F41.9 ANXIETY: ICD-10-CM

## 2023-05-30 RX ORDER — ALPRAZOLAM 0.5 MG/1
0.5 TABLET ORAL 2 TIMES DAILY PRN
Qty: 60 TABLET | Refills: 0 | Status: SHIPPED | OUTPATIENT
Start: 2023-05-30

## 2023-05-30 RX ORDER — ZOLPIDEM TARTRATE 10 MG/1
10 TABLET ORAL
Qty: 30 TABLET | Refills: 0 | Status: SHIPPED | OUTPATIENT
Start: 2023-05-30

## 2023-06-07 ENCOUNTER — TELEPHONE (OUTPATIENT)
Dept: CARDIOLOGY CLINIC | Facility: CLINIC | Age: 53
End: 2023-06-07

## 2023-06-07 ENCOUNTER — HOSPITAL ENCOUNTER (OUTPATIENT)
Dept: NON INVASIVE DIAGNOSTICS | Facility: HOSPITAL | Age: 53
Discharge: HOME | End: 2023-06-07
Attending: INTERNAL MEDICINE
Payer: COMMERCIAL

## 2023-06-07 VITALS
BODY MASS INDEX: 19.25 KG/M2 | HEIGHT: 68 IN | WEIGHT: 127 LBS | HEART RATE: 69 BPM | SYSTOLIC BLOOD PRESSURE: 131 MMHG | DIASTOLIC BLOOD PRESSURE: 65 MMHG

## 2023-06-07 DIAGNOSIS — I50.42 CHRONIC COMBINED SYSTOLIC AND DIASTOLIC HEART FAILURE, NYHA CLASS 2 (HCC): ICD-10-CM

## 2023-06-07 DIAGNOSIS — I42.0 DILATED CARDIOMYOPATHY (HCC): ICD-10-CM

## 2023-06-07 DIAGNOSIS — E78.5 DYSLIPIDEMIA: ICD-10-CM

## 2023-06-07 DIAGNOSIS — R00.0 TACHYCARDIA: ICD-10-CM

## 2023-06-07 DIAGNOSIS — R06.02 SHORTNESS OF BREATH: ICD-10-CM

## 2023-06-07 DIAGNOSIS — I47.1 PSVT (PAROXYSMAL SUPRAVENTRICULAR TACHYCARDIA) (HCC): ICD-10-CM

## 2023-06-07 LAB
AORTIC ROOT: 3 CM
APICAL FOUR CHAMBER EJECTION FRACTION: 41 %
AV AREA PEAK VELOCITY: 2 CM2
AV LVOT MEAN GRADIENT: 2 MMHG
AV LVOT PEAK GRADIENT: 5 MMHG
AV PEAK GRADIENT: 12 MMHG
AV REGURGITATION PRESSURE HALF TIME: 447 MS
DOP CALC LVOT AREA: 3.14 CM2
DOP CALC LVOT DIAMETER: 2 CM
DOP CALC LVOT PEAK VEL VTI: 21.78 CM
DOP CALC LVOT PEAK VEL: 1.08 M/S
DOP CALC LVOT STROKE INDEX: 39.6 ML/M2
DOP CALC LVOT STROKE VOLUME: 68.39 CM3
E WAVE DECELERATION TIME: 218 MS
FRACTIONAL SHORTENING: 18 % (ref 28–44)
INTERVENTRICULAR SEPTUM IN DIASTOLE (PARASTERNAL SHORT AXIS VIEW): 0.7 CM
INTERVENTRICULAR SEPTUM: 0.7 CM (ref 0.6–1.1)
LAAS-AP2: 19.2 CM2
LAAS-AP4: 13.3 CM2
LEFT ATRIUM AREA SYSTOLE SINGLE PLANE A4C: 12.3 CM2
LEFT ATRIUM SIZE: 2.9 CM
LEFT INTERNAL DIMENSION IN SYSTOLE: 3.6 CM (ref 2.1–4)
LEFT VENTRICLE DIASTOLIC VOLUME (MOD BIPLANE): 107 ML
LEFT VENTRICLE SYSTOLIC VOLUME (MOD BIPLANE): 61 ML
LEFT VENTRICULAR INTERNAL DIMENSION IN DIASTOLE: 4.4 CM (ref 3.5–6)
LEFT VENTRICULAR POSTERIOR WALL IN END DIASTOLE: 0.8 CM
LEFT VENTRICULAR STROKE VOLUME: 36 ML
LV EF: 43 %
LVSV (TEICH): 36 ML
MV E'TISSUE VEL-LAT: 10 CM/S
MV E'TISSUE VEL-SEP: 8 CM/S
MV PEAK A VEL: 0.76 M/S
MV PEAK E VEL: 69 CM/S
MV STENOSIS PRESSURE HALF TIME: 63 MS
MV VALVE AREA P 1/2 METHOD: 3.49 CM2
PV PEAK GRADIENT: 3 MMHG
RIGHT ATRIUM AREA SYSTOLE A4C: 8.9 CM2
RIGHT VENTRICLE ID DIMENSION: 2.1 CM
SL CV AV DECELERATION TIME RETROGRADE: 1541 MS
SL CV AV PEAK GRADIENT RETROGRADE: 55 MMHG
SL CV LEFT ATRIUM LENGTH A2C: 5.6 CM
SL CV LV EF: 40
SL CV PED ECHO LEFT VENTRICLE DIASTOLIC VOLUME (MOD BIPLANE) 2D: 90 ML
SL CV PED ECHO LEFT VENTRICLE SYSTOLIC VOLUME (MOD BIPLANE) 2D: 53 ML
TRICUSPID ANNULAR PLANE SYSTOLIC EXCURSION: 1.5 CM

## 2023-06-07 PROCEDURE — 93306 TTE W/DOPPLER COMPLETE: CPT

## 2023-06-07 PROCEDURE — 93225 XTRNL ECG REC<48 HRS REC: CPT

## 2023-06-07 PROCEDURE — 93306 TTE W/DOPPLER COMPLETE: CPT | Performed by: INTERNAL MEDICINE

## 2023-06-07 PROCEDURE — 93226 XTRNL ECG REC<48 HR SCAN A/R: CPT

## 2023-06-07 NOTE — TELEPHONE ENCOUNTER
----- Message from Rohit Morris MD sent at 6/7/2023  4:12 PM EDT -----  His echo Doppler shows patient's EF has been stable 40 to 45% mild to moderate AI no other significant changes she can keep appointment

## 2023-06-09 DIAGNOSIS — I50.42 CHRONIC COMBINED SYSTOLIC AND DIASTOLIC HEART FAILURE, NYHA CLASS 2 (HCC): ICD-10-CM

## 2023-06-09 DIAGNOSIS — I42.0 DILATED CARDIOMYOPATHY (HCC): ICD-10-CM

## 2023-06-14 RX ORDER — LOSARTAN POTASSIUM 50 MG/1
TABLET ORAL
Qty: 90 TABLET | Refills: 3 | Status: SHIPPED | OUTPATIENT
Start: 2023-06-14

## 2023-06-22 ENCOUNTER — TELEPHONE (OUTPATIENT)
Dept: CARDIOLOGY CLINIC | Facility: CLINIC | Age: 53
End: 2023-06-22

## 2023-06-22 NOTE — TELEPHONE ENCOUNTER
----- Message from Bereket Haley MD sent at 6/21/2023  3:52 PM EDT -----  Pt's Holter shows no significant tachy or Vin arrhthymias, few PACs and PVCs were noted    Pt can keep his appointment  Please call patient

## 2023-06-28 DIAGNOSIS — E03.9 HYPOTHYROIDISM, UNSPECIFIED TYPE: ICD-10-CM

## 2023-06-28 DIAGNOSIS — F32.A MODERATE DEPRESSIVE EPISODE: ICD-10-CM

## 2023-06-28 DIAGNOSIS — F41.9 ANXIETY: ICD-10-CM

## 2023-06-28 RX ORDER — LEVOTHYROXINE SODIUM 0.03 MG/1
25 TABLET ORAL DAILY
Qty: 30 TABLET | Refills: 2 | Status: SHIPPED | OUTPATIENT
Start: 2023-06-28

## 2023-06-28 RX ORDER — VENLAFAXINE HYDROCHLORIDE 37.5 MG/1
37.5 CAPSULE, EXTENDED RELEASE ORAL
Qty: 30 CAPSULE | Refills: 2 | Status: SHIPPED | OUTPATIENT
Start: 2023-06-28

## 2023-06-29 DIAGNOSIS — F41.9 ANXIETY: ICD-10-CM

## 2023-06-29 DIAGNOSIS — F51.01 PRIMARY INSOMNIA: ICD-10-CM

## 2023-06-29 RX ORDER — ZOLPIDEM TARTRATE 10 MG/1
10 TABLET ORAL
Qty: 30 TABLET | Refills: 0 | Status: SHIPPED | OUTPATIENT
Start: 2023-06-29

## 2023-06-29 RX ORDER — ALPRAZOLAM 0.5 MG/1
0.5 TABLET ORAL 2 TIMES DAILY PRN
Qty: 60 TABLET | Refills: 0 | Status: SHIPPED | OUTPATIENT
Start: 2023-06-29

## 2023-07-23 ENCOUNTER — HOSPITAL ENCOUNTER (EMERGENCY)
Facility: HOSPITAL | Age: 53
Discharge: HOME | End: 2023-07-23
Attending: EMERGENCY MEDICINE
Payer: COMMERCIAL

## 2023-07-23 VITALS
HEART RATE: 91 BPM | RESPIRATION RATE: 18 BRPM | TEMPERATURE: 97.7 F | WEIGHT: 127 LBS | SYSTOLIC BLOOD PRESSURE: 121 MMHG | BODY MASS INDEX: 19.31 KG/M2 | OXYGEN SATURATION: 97 % | DIASTOLIC BLOOD PRESSURE: 63 MMHG

## 2023-07-23 DIAGNOSIS — R00.2 PALPITATIONS: Primary | ICD-10-CM

## 2023-07-23 DIAGNOSIS — F10.20 ALCOHOL DEPENDENCE (HCC): ICD-10-CM

## 2023-07-23 LAB
ALBUMIN SERPL BCP-MCNC: 4.8 G/DL (ref 3.5–5)
ALP SERPL-CCNC: 74 U/L (ref 34–104)
ALT SERPL W P-5'-P-CCNC: 37 U/L (ref 7–52)
AMPHETAMINES SERPL QL SCN: NEGATIVE
ANION GAP SERPL CALCULATED.3IONS-SCNC: 12 MMOL/L
AST SERPL W P-5'-P-CCNC: 51 U/L (ref 13–39)
ATRIAL RATE: 87 BPM
BACTERIA UR QL AUTO: ABNORMAL /HPF
BARBITURATES UR QL: NEGATIVE
BASOPHILS # BLD AUTO: 0.05 THOUSANDS/ÂΜL (ref 0–0.1)
BASOPHILS NFR BLD AUTO: 1 % (ref 0–1)
BENZODIAZ UR QL: NEGATIVE
BILIRUB SERPL-MCNC: 0.54 MG/DL (ref 0.2–1)
BILIRUB UR QL STRIP: NEGATIVE
BUN SERPL-MCNC: 14 MG/DL (ref 5–25)
CALCIUM SERPL-MCNC: 9.6 MG/DL (ref 8.4–10.2)
CHLORIDE SERPL-SCNC: 100 MMOL/L (ref 96–108)
CLARITY UR: CLEAR
CO2 SERPL-SCNC: 24 MMOL/L (ref 21–32)
COCAINE UR QL: NEGATIVE
COLOR UR: YELLOW
CREAT SERPL-MCNC: 0.85 MG/DL (ref 0.6–1.3)
EOSINOPHIL # BLD AUTO: 0.11 THOUSAND/ÂΜL (ref 0–0.61)
EOSINOPHIL NFR BLD AUTO: 3 % (ref 0–6)
ERYTHROCYTE [DISTWIDTH] IN BLOOD BY AUTOMATED COUNT: 12.9 % (ref 11.6–15.1)
ETHANOL SERPL-MCNC: 32 MG/DL
GFR SERPL CREATININE-BSD FRML MDRD: 78 ML/MIN/1.73SQ M
GLUCOSE SERPL-MCNC: 93 MG/DL (ref 65–140)
GLUCOSE UR STRIP-MCNC: NEGATIVE MG/DL
HCT VFR BLD AUTO: 42.7 % (ref 34.8–46.1)
HGB BLD-MCNC: 14.2 G/DL (ref 11.5–15.4)
HGB UR QL STRIP.AUTO: ABNORMAL
IMM GRANULOCYTES # BLD AUTO: 0.01 THOUSAND/UL (ref 0–0.2)
IMM GRANULOCYTES NFR BLD AUTO: 0 % (ref 0–2)
KETONES UR STRIP-MCNC: NEGATIVE MG/DL
LEUKOCYTE ESTERASE UR QL STRIP: NEGATIVE
LYMPHOCYTES # BLD AUTO: 0.95 THOUSANDS/ÂΜL (ref 0.6–4.47)
LYMPHOCYTES NFR BLD AUTO: 24 % (ref 14–44)
MAGNESIUM SERPL-MCNC: 2.2 MG/DL (ref 1.9–2.7)
MCH RBC QN AUTO: 32.2 PG (ref 26.8–34.3)
MCHC RBC AUTO-ENTMCNC: 33.3 G/DL (ref 31.4–37.4)
MCV RBC AUTO: 97 FL (ref 82–98)
METHADONE UR QL: NEGATIVE
MONOCYTES # BLD AUTO: 0.46 THOUSAND/ÂΜL (ref 0.17–1.22)
MONOCYTES NFR BLD AUTO: 11 % (ref 4–12)
MUCOUS THREADS UR QL AUTO: ABNORMAL
NEUTROPHILS # BLD AUTO: 2.46 THOUSANDS/ÂΜL (ref 1.85–7.62)
NEUTS SEG NFR BLD AUTO: 61 % (ref 43–75)
NITRITE UR QL STRIP: NEGATIVE
NON-SQ EPI CELLS URNS QL MICRO: ABNORMAL /HPF
NRBC BLD AUTO-RTO: 0 /100 WBCS
OPIATES UR QL SCN: NEGATIVE
OXYCODONE+OXYMORPHONE UR QL SCN: NEGATIVE
P AXIS: 79 DEGREES
PCP UR QL: NEGATIVE
PH UR STRIP.AUTO: 5 [PH]
PLATELET # BLD AUTO: 197 THOUSANDS/UL (ref 149–390)
PMV BLD AUTO: 9.2 FL (ref 8.9–12.7)
POTASSIUM SERPL-SCNC: 4 MMOL/L (ref 3.5–5.3)
PR INTERVAL: 132 MS
PROT SERPL-MCNC: 7.9 G/DL (ref 6.4–8.4)
PROT UR STRIP-MCNC: ABNORMAL MG/DL
QRS AXIS: 35 DEGREES
QRSD INTERVAL: 80 MS
QT INTERVAL: 364 MS
QTC INTERVAL: 438 MS
RBC # BLD AUTO: 4.41 MILLION/UL (ref 3.81–5.12)
RBC #/AREA URNS AUTO: ABNORMAL /HPF
SODIUM SERPL-SCNC: 136 MMOL/L (ref 135–147)
SP GR UR STRIP.AUTO: 1.02 (ref 1–1.03)
T WAVE AXIS: 72 DEGREES
THC UR QL: NEGATIVE
TSH SERPL DL<=0.05 MIU/L-ACNC: 5.94 UIU/ML (ref 0.45–4.5)
URATE CRY URNS QL MICRO: ABNORMAL /HPF
UROBILINOGEN UR QL STRIP.AUTO: 0.2 E.U./DL
VENTRICULAR RATE: 87 BPM
WBC # BLD AUTO: 4.04 THOUSAND/UL (ref 4.31–10.16)
WBC #/AREA URNS AUTO: ABNORMAL /HPF

## 2023-07-23 PROCEDURE — 81001 URINALYSIS AUTO W/SCOPE: CPT | Performed by: EMERGENCY MEDICINE

## 2023-07-23 PROCEDURE — 93010 ELECTROCARDIOGRAM REPORT: CPT | Performed by: INTERNAL MEDICINE

## 2023-07-23 PROCEDURE — 99285 EMERGENCY DEPT VISIT HI MDM: CPT

## 2023-07-23 PROCEDURE — 93005 ELECTROCARDIOGRAM TRACING: CPT

## 2023-07-23 PROCEDURE — 80307 DRUG TEST PRSMV CHEM ANLYZR: CPT | Performed by: EMERGENCY MEDICINE

## 2023-07-23 PROCEDURE — 85025 COMPLETE CBC W/AUTO DIFF WBC: CPT | Performed by: EMERGENCY MEDICINE

## 2023-07-23 PROCEDURE — 82077 ASSAY SPEC XCP UR&BREATH IA: CPT | Performed by: EMERGENCY MEDICINE

## 2023-07-23 PROCEDURE — 96375 TX/PRO/DX INJ NEW DRUG ADDON: CPT

## 2023-07-23 PROCEDURE — 83735 ASSAY OF MAGNESIUM: CPT | Performed by: EMERGENCY MEDICINE

## 2023-07-23 PROCEDURE — 36415 COLL VENOUS BLD VENIPUNCTURE: CPT | Performed by: EMERGENCY MEDICINE

## 2023-07-23 PROCEDURE — 84443 ASSAY THYROID STIM HORMONE: CPT | Performed by: EMERGENCY MEDICINE

## 2023-07-23 PROCEDURE — 80053 COMPREHEN METABOLIC PANEL: CPT | Performed by: EMERGENCY MEDICINE

## 2023-07-23 PROCEDURE — 96365 THER/PROPH/DIAG IV INF INIT: CPT

## 2023-07-23 RX ORDER — ONDANSETRON 2 MG/ML
4 INJECTION INTRAMUSCULAR; INTRAVENOUS ONCE
Status: COMPLETED | OUTPATIENT
Start: 2023-07-23 | End: 2023-07-23

## 2023-07-23 RX ORDER — LORAZEPAM 2 MG/ML
1 INJECTION INTRAMUSCULAR ONCE
Status: COMPLETED | OUTPATIENT
Start: 2023-07-23 | End: 2023-07-23

## 2023-07-23 RX ORDER — ONDANSETRON 4 MG/1
4 TABLET, ORALLY DISINTEGRATING ORAL EVERY 6 HOURS PRN
Qty: 9 TABLET | Refills: 0 | Status: SHIPPED | OUTPATIENT
Start: 2023-07-23 | End: 2023-07-26

## 2023-07-23 RX ORDER — LANOLIN ALCOHOL/MO/W.PET/CERES
100 CREAM (GRAM) TOPICAL ONCE
Status: COMPLETED | OUTPATIENT
Start: 2023-07-23 | End: 2023-07-23

## 2023-07-23 RX ORDER — FOLIC ACID 1 MG/1
1 TABLET ORAL ONCE
Status: COMPLETED | OUTPATIENT
Start: 2023-07-23 | End: 2023-07-23

## 2023-07-23 RX ORDER — LORAZEPAM 1 MG/1
1 TABLET ORAL 2 TIMES DAILY
Qty: 14 TABLET | Refills: 0 | Status: SHIPPED | OUTPATIENT
Start: 2023-07-23 | End: 2023-07-26 | Stop reason: SDUPTHER

## 2023-07-23 RX ORDER — ONDANSETRON 4 MG/1
4 TABLET, ORALLY DISINTEGRATING ORAL EVERY 6 HOURS PRN
Qty: 9 TABLET | Refills: 0 | Status: SHIPPED | OUTPATIENT
Start: 2023-07-23 | End: 2023-07-23 | Stop reason: SDUPTHER

## 2023-07-23 RX ORDER — LORAZEPAM 1 MG/1
1 TABLET ORAL 2 TIMES DAILY
Qty: 14 TABLET | Refills: 0 | Status: SHIPPED | OUTPATIENT
Start: 2023-07-23 | End: 2023-07-23 | Stop reason: SDUPTHER

## 2023-07-23 RX ADMIN — LORAZEPAM 1 MG: 2 INJECTION INTRAMUSCULAR; INTRAVENOUS at 04:52

## 2023-07-23 RX ADMIN — THIAMINE HCL TAB 100 MG 100 MG: 100 TAB at 06:14

## 2023-07-23 RX ADMIN — SODIUM CHLORIDE, SODIUM LACTATE, POTASSIUM CHLORIDE, AND CALCIUM CHLORIDE 1000 ML: .6; .31; .03; .02 INJECTION, SOLUTION INTRAVENOUS at 04:53

## 2023-07-23 RX ADMIN — ONDANSETRON 4 MG: 2 INJECTION INTRAMUSCULAR; INTRAVENOUS at 04:51

## 2023-07-23 RX ADMIN — FOLIC ACID 1 MG: 1 TABLET ORAL at 06:14
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
## 2023-07-23 NOTE — DISCHARGE INSTRUCTIONS
Please do not mix alcohol and Ativan together. Follow-up outpatient for alcohol detox. Call [phone] for alcohol detox resources.

## 2023-07-23 NOTE — ED NOTES
Patient informed this RN that she does have thoughts of self-harm but does not feel she would ever go through with anything and is seeking help. Pt believes her feelings stem from alcohol abuse, to which she is seeking help/ detox.       Leonides Boas, RN  07/23/23 9137

## 2023-07-26 DIAGNOSIS — F41.9 ANXIETY: ICD-10-CM

## 2023-07-26 DIAGNOSIS — F10.20 ALCOHOL DEPENDENCE (HCC): ICD-10-CM

## 2023-07-26 RX ORDER — ALPRAZOLAM 0.5 MG/1
0.5 TABLET ORAL 2 TIMES DAILY PRN
Qty: 60 TABLET | Refills: 0 | Status: SHIPPED | OUTPATIENT
Start: 2023-07-26

## 2023-07-26 RX ORDER — LORAZEPAM 1 MG/1
1 TABLET ORAL 2 TIMES DAILY
Qty: 14 TABLET | Refills: 0 | Status: SHIPPED | OUTPATIENT
Start: 2023-07-26 | End: 2023-08-02

## 2023-07-26 NOTE — TELEPHONE ENCOUNTER
Pt called requesting refills. She has 1 day supply left, enough for tomorrow. Confirmed to send to [facility]. Made aware to call 2-3 in advance in the future.

## 2023-08-09 DIAGNOSIS — F51.01 PRIMARY INSOMNIA: ICD-10-CM

## 2023-08-09 RX ORDER — ZOLPIDEM TARTRATE 10 MG/1
10 TABLET ORAL
Qty: 30 TABLET | Refills: 0 | Status: SHIPPED | OUTPATIENT
Start: 2023-08-09

## 2023-08-12 ENCOUNTER — HOSPITAL ENCOUNTER (EMERGENCY)
Facility: HOSPITAL | Age: 53
Discharge: ANOTHER HOSPITAL | End: 2023-08-12
Attending: EMERGENCY MEDICINE
Payer: COMMERCIAL

## 2023-08-12 ENCOUNTER — HOSPITAL ENCOUNTER (INPATIENT)
Facility: HOSPITAL | Age: 53
LOS: 2 days | Discharge: HOME | End: 2023-08-14
Attending: EMERGENCY MEDICINE | Admitting: EMERGENCY MEDICINE
Payer: COMMERCIAL

## 2023-08-12 VITALS
DIASTOLIC BLOOD PRESSURE: 66 MMHG | TEMPERATURE: 97.7 F | HEART RATE: 98 BPM | WEIGHT: 135.2 LBS | SYSTOLIC BLOOD PRESSURE: 132 MMHG | RESPIRATION RATE: 18 BRPM | OXYGEN SATURATION: 99 % | BODY MASS INDEX: 20.56 KG/M2

## 2023-08-12 DIAGNOSIS — F10.10 ALCOHOL ABUSE: ICD-10-CM

## 2023-08-12 DIAGNOSIS — F10.929 ALCOHOL INTOXICATION (HCC): Primary | ICD-10-CM

## 2023-08-12 DIAGNOSIS — F41.9 ANXIETY: ICD-10-CM

## 2023-08-12 DIAGNOSIS — F10.90 ALCOHOL USE DISORDER: ICD-10-CM

## 2023-08-12 DIAGNOSIS — R45.851 SUICIDAL IDEATIONS: Primary | ICD-10-CM

## 2023-08-12 PROBLEM — F10.939 ALCOHOL WITHDRAWAL SYNDROME WITH COMPLICATION (HCC): Status: ACTIVE | Noted: 2023-08-12

## 2023-08-12 LAB
ALBUMIN SERPL BCP-MCNC: 4.6 G/DL (ref 3.5–5)
ALP SERPL-CCNC: 64 U/L (ref 34–104)
ALT SERPL W P-5'-P-CCNC: 37 U/L (ref 7–52)
AMPHETAMINES SERPL QL SCN: NEGATIVE
ANION GAP SERPL CALCULATED.3IONS-SCNC: 10 MMOL/L
AST SERPL W P-5'-P-CCNC: 50 U/L (ref 13–39)
BACTERIA UR QL AUTO: ABNORMAL /HPF
BARBITURATES UR QL: NEGATIVE
BASOPHILS # BLD AUTO: 0.04 THOUSANDS/ÂΜL (ref 0–0.1)
BASOPHILS NFR BLD AUTO: 1 % (ref 0–1)
BENZODIAZ UR QL: NEGATIVE
BILIRUB SERPL-MCNC: 0.45 MG/DL (ref 0.2–1)
BILIRUB UR QL STRIP: NEGATIVE
BUN SERPL-MCNC: 14 MG/DL (ref 5–25)
CALCIUM SERPL-MCNC: 9.5 MG/DL (ref 8.4–10.2)
CHLORIDE SERPL-SCNC: 101 MMOL/L (ref 96–108)
CLARITY UR: CLEAR
CO2 SERPL-SCNC: 25 MMOL/L (ref 21–32)
COCAINE UR QL: NEGATIVE
COLOR UR: ABNORMAL
CREAT SERPL-MCNC: 0.79 MG/DL (ref 0.6–1.3)
EOSINOPHIL # BLD AUTO: 0.17 THOUSAND/ÂΜL (ref 0–0.61)
EOSINOPHIL NFR BLD AUTO: 3 % (ref 0–6)
ERYTHROCYTE [DISTWIDTH] IN BLOOD BY AUTOMATED COUNT: 12.8 % (ref 11.6–15.1)
ETHANOL SERPL-MCNC: 251 MG/DL
GFR SERPL CREATININE-BSD FRML MDRD: 85 ML/MIN/1.73SQ M
GLUCOSE SERPL-MCNC: 103 MG/DL (ref 65–140)
GLUCOSE UR STRIP-MCNC: NEGATIVE MG/DL
HCT VFR BLD AUTO: 36.7 % (ref 34.8–46.1)
HGB BLD-MCNC: 12.2 G/DL (ref 11.5–15.4)
HGB UR QL STRIP.AUTO: ABNORMAL
IMM GRANULOCYTES # BLD AUTO: 0.02 THOUSAND/UL (ref 0–0.2)
IMM GRANULOCYTES NFR BLD AUTO: 0 % (ref 0–2)
KETONES UR STRIP-MCNC: NEGATIVE MG/DL
LEUKOCYTE ESTERASE UR QL STRIP: NEGATIVE
LYMPHOCYTES # BLD AUTO: 1.56 THOUSANDS/ÂΜL (ref 0.6–4.47)
LYMPHOCYTES NFR BLD AUTO: 23 % (ref 14–44)
MCH RBC QN AUTO: 32.4 PG (ref 26.8–34.3)
MCHC RBC AUTO-ENTMCNC: 33.2 G/DL (ref 31.4–37.4)
MCV RBC AUTO: 97 FL (ref 82–98)
METHADONE UR QL: NEGATIVE
MONOCYTES # BLD AUTO: 0.63 THOUSAND/ÂΜL (ref 0.17–1.22)
MONOCYTES NFR BLD AUTO: 9 % (ref 4–12)
NEUTROPHILS # BLD AUTO: 4.26 THOUSANDS/ÂΜL (ref 1.85–7.62)
NEUTS SEG NFR BLD AUTO: 64 % (ref 43–75)
NITRITE UR QL STRIP: NEGATIVE
NON-SQ EPI CELLS URNS QL MICRO: ABNORMAL /HPF
NRBC BLD AUTO-RTO: 0 /100 WBCS
OPIATES UR QL SCN: NEGATIVE
OXYCODONE+OXYMORPHONE UR QL SCN: NEGATIVE
PCP UR QL: NEGATIVE
PH UR STRIP.AUTO: 5 [PH]
PLATELET # BLD AUTO: 236 THOUSANDS/UL (ref 149–390)
PMV BLD AUTO: 8.8 FL (ref 8.9–12.7)
POTASSIUM SERPL-SCNC: 4.1 MMOL/L (ref 3.5–5.3)
PROT SERPL-MCNC: 7.5 G/DL (ref 6.4–8.4)
PROT UR STRIP-MCNC: NEGATIVE MG/DL
RBC # BLD AUTO: 3.77 MILLION/UL (ref 3.81–5.12)
RBC #/AREA URNS AUTO: ABNORMAL /HPF
SODIUM SERPL-SCNC: 136 MMOL/L (ref 135–147)
SP GR UR STRIP.AUTO: <=1.005 (ref 1–1.03)
THC UR QL: NEGATIVE
UROBILINOGEN UR QL STRIP.AUTO: 0.2 E.U./DL
WBC # BLD AUTO: 6.68 THOUSAND/UL (ref 4.31–10.16)
WBC #/AREA URNS AUTO: ABNORMAL /HPF

## 2023-08-12 PROCEDURE — NC001 PR NO CHARGE: Performed by: EMERGENCY MEDICINE

## 2023-08-12 PROCEDURE — 99285 EMERGENCY DEPT VISIT HI MDM: CPT

## 2023-08-12 PROCEDURE — 80307 DRUG TEST PRSMV CHEM ANLYZR: CPT | Performed by: EMERGENCY MEDICINE

## 2023-08-12 PROCEDURE — 82077 ASSAY SPEC XCP UR&BREATH IA: CPT | Performed by: EMERGENCY MEDICINE

## 2023-08-12 PROCEDURE — 85025 COMPLETE CBC W/AUTO DIFF WBC: CPT | Performed by: EMERGENCY MEDICINE

## 2023-08-12 PROCEDURE — 81001 URINALYSIS AUTO W/SCOPE: CPT | Performed by: EMERGENCY MEDICINE

## 2023-08-12 PROCEDURE — 99285 EMERGENCY DEPT VISIT HI MDM: CPT | Performed by: EMERGENCY MEDICINE

## 2023-08-12 PROCEDURE — 80053 COMPREHEN METABOLIC PANEL: CPT | Performed by: EMERGENCY MEDICINE

## 2023-08-12 PROCEDURE — 96374 THER/PROPH/DIAG INJ IV PUSH: CPT

## 2023-08-12 PROCEDURE — 36415 COLL VENOUS BLD VENIPUNCTURE: CPT | Performed by: EMERGENCY MEDICINE

## 2023-08-12 PROCEDURE — 99291 CRITICAL CARE FIRST HOUR: CPT | Performed by: PHYSICIAN ASSISTANT

## 2023-08-12 RX ORDER — LEVOTHYROXINE SODIUM 0.05 MG/1
25 TABLET ORAL DAILY
Status: DISCONTINUED | OUTPATIENT
Start: 2023-08-13 | End: 2023-08-14 | Stop reason: HOSPADM

## 2023-08-12 RX ORDER — ASPIRIN 81 MG/1
81 TABLET, CHEWABLE ORAL DAILY
Status: DISCONTINUED | OUTPATIENT
Start: 2023-08-13 | End: 2023-08-14 | Stop reason: HOSPADM

## 2023-08-12 RX ORDER — SODIUM CHLORIDE 9 MG/ML
100 INJECTION, SOLUTION INTRAVENOUS CONTINUOUS
Status: DISCONTINUED | OUTPATIENT
Start: 2023-08-12 | End: 2023-08-13

## 2023-08-12 RX ORDER — CHLORDIAZEPOXIDE HYDROCHLORIDE 25 MG/1
50 CAPSULE, GELATIN COATED ORAL ONCE
Status: COMPLETED | OUTPATIENT
Start: 2023-08-12 | End: 2023-08-12

## 2023-08-12 RX ORDER — METOPROLOL SUCCINATE 25 MG/1
25 TABLET, EXTENDED RELEASE ORAL DAILY
Status: DISCONTINUED | OUTPATIENT
Start: 2023-08-13 | End: 2023-08-14 | Stop reason: HOSPADM

## 2023-08-12 RX ORDER — ATORVASTATIN CALCIUM 20 MG/1
20 TABLET, FILM COATED ORAL
Status: DISCONTINUED | OUTPATIENT
Start: 2023-08-13 | End: 2023-08-14 | Stop reason: HOSPADM

## 2023-08-12 RX ORDER — PHENOBARBITAL SODIUM 130 MG/ML
260 INJECTION INTRAMUSCULAR ONCE
Status: COMPLETED | OUTPATIENT
Start: 2023-08-12 | End: 2023-08-12

## 2023-08-12 RX ORDER — HYDROXYZINE HYDROCHLORIDE 25 MG/1
25 TABLET, FILM COATED ORAL EVERY 6 HOURS PRN
Status: DISCONTINUED | OUTPATIENT
Start: 2023-08-12 | End: 2023-08-14 | Stop reason: HOSPADM

## 2023-08-12 RX ORDER — ACETAMINOPHEN 325 MG/1
650 TABLET ORAL EVERY 6 HOURS PRN
Status: DISCONTINUED | OUTPATIENT
Start: 2023-08-12 | End: 2023-08-14 | Stop reason: HOSPADM

## 2023-08-12 RX ORDER — VENLAFAXINE HYDROCHLORIDE 37.5 MG/1
37.5 CAPSULE, EXTENDED RELEASE ORAL
Status: DISCONTINUED | OUTPATIENT
Start: 2023-08-13 | End: 2023-08-13

## 2023-08-12 RX ORDER — ENOXAPARIN SODIUM 100 MG/ML
40 INJECTION SUBCUTANEOUS DAILY
Status: DISCONTINUED | OUTPATIENT
Start: 2023-08-13 | End: 2023-08-13

## 2023-08-12 RX ORDER — ONDANSETRON 2 MG/ML
4 INJECTION INTRAMUSCULAR; INTRAVENOUS EVERY 6 HOURS PRN
Status: DISCONTINUED | OUTPATIENT
Start: 2023-08-12 | End: 2023-08-14 | Stop reason: HOSPADM

## 2023-08-12 RX ORDER — LOSARTAN POTASSIUM 50 MG/1
50 TABLET ORAL EVERY MORNING
Status: DISCONTINUED | OUTPATIENT
Start: 2023-08-13 | End: 2023-08-14 | Stop reason: HOSPADM

## 2023-08-12 RX ORDER — METOPROLOL SUCCINATE 50 MG/1
100 TABLET, EXTENDED RELEASE ORAL DAILY
Status: DISCONTINUED | OUTPATIENT
Start: 2023-08-13 | End: 2023-08-14 | Stop reason: HOSPADM

## 2023-08-12 RX ORDER — LORAZEPAM 2 MG/ML
1 INJECTION INTRAMUSCULAR ONCE
Status: COMPLETED | OUTPATIENT
Start: 2023-08-12 | End: 2023-08-12

## 2023-08-12 RX ORDER — ENOXAPARIN SODIUM 100 MG/ML
40 INJECTION SUBCUTANEOUS DAILY
Status: DISCONTINUED | OUTPATIENT
Start: 2023-08-13 | End: 2023-08-14 | Stop reason: HOSPADM

## 2023-08-12 RX ADMIN — ONDANSETRON 4 MG: 2 INJECTION INTRAMUSCULAR; INTRAVENOUS at 21:35

## 2023-08-12 RX ADMIN — LORAZEPAM 1 MG: 2 INJECTION INTRAMUSCULAR; INTRAVENOUS at 19:19

## 2023-08-12 RX ADMIN — SODIUM CHLORIDE 100 ML/HR: 0.9 INJECTION, SOLUTION INTRAVENOUS at 21:24

## 2023-08-12 RX ADMIN — PHENOBARBITAL SODIUM 260 MG: 130 INJECTION INTRAMUSCULAR at 23:26

## 2023-08-12 RX ADMIN — CHLORDIAZEPOXIDE HYDROCHLORIDE 50 MG: 25 CAPSULE ORAL at 14:16
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
## 2023-08-12 NOTE — Clinical Note
Maria De Jesus Camejo should be transferred out to [facility] and has been medically cleared.

## 2023-08-12 NOTE — EMTALA/ACUTE CARE TRANSFER
[address] EMERGENCY DEPARTMENT  [address]  [address] [address]  Dept: [phone]      EMTALA TRANSFER CONSENT    NAME Judith MANCILLA 1970                              MRN [id number]    I have been informed of my rights regarding examination, treatment, and transfer   by Dr. Judge Timo MD    Benefits: Specialized equipment and/or services available at the receiving facility (Include comment)________________________    Risks: Potential for delay in receiving treatment      Consent for Transfer:  I acknowledge that my medical condition has been evaluated and explained to me by the emergency department physician or other qualified medical person and/or my attending physician, who has recommended that I be transferred to the service of  Accepting Physician: Mac Piedra at [address] Name, [address] [facility]. The above potential benefits of such transfer, the potential risks associated with such transfer, and the probable risks of not being transferred have been explained to me, and I fully understand them. The doctor has explained that, in my case, the benefits of transfer outweigh the risks. I agree to be transferred. I authorize the performance of emergency medical procedures and treatments upon me in both transit and upon arrival at the receiving facility. Additionally, I authorize the release of any and all medical records to the receiving facility and request they be transported with me, if possible. I understand that the safest mode of transportation during a medical emergency is an ambulance and that the Hospital advocates the use of this mode of transport. Risks of traveling to the receiving facility by car, including absence of medical control, life sustaining equipment, such as oxygen, and medical personnel has been explained to me and I fully understand them.     (TIMO CORRECT BOX BELOW)  [  ]  I consent to the stated transfer and to be transported by ambulance/helicopter. [  ]  I consent to the stated transfer, but refuse transportation by ambulance and accept full responsibility for my transportation by car. I understand the risks of non-ambulance transfers and I exonerate the Hospital and its staff from any deterioration in my condition that results from this refusal.    X___________________________________________    DATE  23  TIME________  Signature of patient or legally responsible individual signing on patient behalf           RELATIONSHIP TO PATIENT_________________________          Provider Certification    NAME Judith Bradshaw                                         1970                              MRN [id number]    A medical screening exam was performed on the above named patient. Based on the examination:    Condition Necessitating Transfer The encounter diagnosis was Alcohol intoxication ([address]). Patient Condition: The patient has been stabilized such that within reasonable medical probability, no material deterioration of the patient condition or the condition of the unborn child(chaitanya) is likely to result from the transfer    Reason for Transfer: Level of Care needed not available at this facility    Transfer Requirements: Facility [facility]   · Space available and qualified personnel available for treatment as acknowledged by    · Agreed to accept transfer and to provide appropriate medical treatment as acknowledged by       Lynnette  · Appropriate medical records of the examination and treatment of the patient are provided at the time of transfer   [address] _______  · Transfer will be performed by qualified personnel from    and appropriate transfer equipment as required, including the use of necessary and appropriate life support measures.     Provider Certification: I have examined the patient and explained the following risks and benefits of being transferred/refusing transfer to the patient/family:  General risk, such as traffic hazards, adverse weather conditions, rough terrain or turbulence, possible failure of equipment (including vehicle or aircraft), or consequences of actions of persons outside the control of the transport personnel      Based on these reasonable risks and benefits to the patient and/or the unborn child(chaitanya), and based upon the information available at the time of the patient’s examination, I certify that the medical benefits reasonably to be expected from the provision of appropriate medical treatments at another medical facility outweigh the increasing risks, if any, to the individual’s medical condition, and in the case of labor to the unborn child, from effecting the transfer.     X____________________________________________ DATE 08/12/23        TIME_______      ORIGINAL - SEND TO MEDICAL RECORDS   COPY - SEND WITH PATIENT DURING TRANSFER

## 2023-08-13 LAB
ALBUMIN SERPL BCP-MCNC: 4 G/DL (ref 3.5–5)
ALP SERPL-CCNC: 54 U/L (ref 34–104)
ALT SERPL W P-5'-P-CCNC: 34 U/L (ref 7–52)
AST SERPL W P-5'-P-CCNC: 44 U/L (ref 13–39)
BASOPHILS # BLD AUTO: 0.03 THOUSANDS/ÂΜL (ref 0–0.1)
BASOPHILS NFR BLD AUTO: 1 % (ref 0–1)
BILIRUB DIRECT SERPL-MCNC: 0.17 MG/DL (ref 0–0.2)
BILIRUB SERPL-MCNC: 0.67 MG/DL (ref 0.2–1)
EOSINOPHIL # BLD AUTO: 0.16 THOUSAND/ÂΜL (ref 0–0.61)
EOSINOPHIL NFR BLD AUTO: 5 % (ref 0–6)
ERYTHROCYTE [DISTWIDTH] IN BLOOD BY AUTOMATED COUNT: 12.6 % (ref 11.6–15.1)
HCT VFR BLD AUTO: 36.3 % (ref 34.8–46.1)
HGB BLD-MCNC: 11.8 G/DL (ref 11.5–15.4)
IMM GRANULOCYTES # BLD AUTO: 0.01 THOUSAND/UL (ref 0–0.2)
IMM GRANULOCYTES NFR BLD AUTO: 0 % (ref 0–2)
LYMPHOCYTES # BLD AUTO: 0.54 THOUSANDS/ÂΜL (ref 0.6–4.47)
LYMPHOCYTES NFR BLD AUTO: 15 % (ref 14–44)
MAGNESIUM SERPL-MCNC: 1.8 MG/DL (ref 1.9–2.7)
MCH RBC QN AUTO: 31.7 PG (ref 26.8–34.3)
MCHC RBC AUTO-ENTMCNC: 32.5 G/DL (ref 31.4–37.4)
MCV RBC AUTO: 98 FL (ref 82–98)
MONOCYTES # BLD AUTO: 0.39 THOUSAND/ÂΜL (ref 0.17–1.22)
MONOCYTES NFR BLD AUTO: 11 % (ref 4–12)
NEUTROPHILS # BLD AUTO: 2.42 THOUSANDS/ÂΜL (ref 1.85–7.62)
NEUTS SEG NFR BLD AUTO: 68 % (ref 43–75)
NRBC BLD AUTO-RTO: 0 /100 WBCS
PLATELET # BLD AUTO: 170 THOUSANDS/UL (ref 149–390)
PMV BLD AUTO: 9 FL (ref 8.9–12.7)
PROT SERPL-MCNC: 6.4 G/DL (ref 6.4–8.4)
RBC # BLD AUTO: 3.72 MILLION/UL (ref 3.81–5.12)
WBC # BLD AUTO: 3.55 THOUSAND/UL (ref 4.31–10.16)

## 2023-08-13 PROCEDURE — 85025 COMPLETE CBC W/AUTO DIFF WBC: CPT | Performed by: PHYSICIAN ASSISTANT

## 2023-08-13 PROCEDURE — 80076 HEPATIC FUNCTION PANEL: CPT | Performed by: PHYSICIAN ASSISTANT

## 2023-08-13 PROCEDURE — 99223 1ST HOSP IP/OBS HIGH 75: CPT | Performed by: STUDENT IN AN ORGANIZED HEALTH CARE EDUCATION/TRAINING PROGRAM

## 2023-08-13 PROCEDURE — 83735 ASSAY OF MAGNESIUM: CPT | Performed by: PHYSICIAN ASSISTANT

## 2023-08-13 PROCEDURE — 99233 SBSQ HOSP IP/OBS HIGH 50: CPT | Performed by: EMERGENCY MEDICINE

## 2023-08-13 RX ORDER — LANOLIN ALCOHOL/MO/W.PET/CERES
100 CREAM (GRAM) TOPICAL DAILY
Status: DISCONTINUED | OUTPATIENT
Start: 2023-08-14 | End: 2023-08-14 | Stop reason: HOSPADM

## 2023-08-13 RX ORDER — PHENOBARBITAL SODIUM 130 MG/ML
130 INJECTION INTRAMUSCULAR ONCE
Status: COMPLETED | OUTPATIENT
Start: 2023-08-13 | End: 2023-08-13

## 2023-08-13 RX ORDER — PHENOBARBITAL 64.8 MG/1
64.8 TABLET ORAL ONCE
Status: COMPLETED | OUTPATIENT
Start: 2023-08-13 | End: 2023-08-13

## 2023-08-13 RX ORDER — ESCITALOPRAM OXALATE 5 MG/1
5 TABLET ORAL DAILY
Status: DISCONTINUED | OUTPATIENT
Start: 2023-08-14 | End: 2023-08-14 | Stop reason: HOSPADM

## 2023-08-13 RX ORDER — FOLIC ACID 1 MG/1
1 TABLET ORAL DAILY
Status: DISCONTINUED | OUTPATIENT
Start: 2023-08-14 | End: 2023-08-14 | Stop reason: HOSPADM

## 2023-08-13 RX ORDER — LOPERAMIDE HYDROCHLORIDE 2 MG/1
4 CAPSULE ORAL 4 TIMES DAILY PRN
Status: DISCONTINUED | OUTPATIENT
Start: 2023-08-13 | End: 2023-08-14 | Stop reason: HOSPADM

## 2023-08-13 RX ADMIN — HYDROXYZINE HYDROCHLORIDE 25 MG: 25 TABLET ORAL at 18:16

## 2023-08-13 RX ADMIN — PHENOBARBITAL SODIUM 130 MG: 130 INJECTION INTRAMUSCULAR at 06:16

## 2023-08-13 RX ADMIN — LEVOTHYROXINE SODIUM 25 MCG: 50 TABLET ORAL at 06:09

## 2023-08-13 RX ADMIN — ONDANSETRON 4 MG: 2 INJECTION INTRAMUSCULAR; INTRAVENOUS at 06:11

## 2023-08-13 RX ADMIN — ASPIRIN 81 MG 81 MG: 81 TABLET ORAL at 08:36

## 2023-08-13 RX ADMIN — PHENOBARBITAL SODIUM 130 MG: 130 INJECTION INTRAMUSCULAR at 13:24

## 2023-08-13 RX ADMIN — ATORVASTATIN CALCIUM 20 MG: 20 TABLET, FILM COATED ORAL at 17:22

## 2023-08-13 RX ADMIN — LOPERAMIDE HYDROCHLORIDE 4 MG: 2 CAPSULE ORAL at 14:52

## 2023-08-13 RX ADMIN — PHENOBARBITAL 64.8 MG: 64.8 TABLET ORAL at 11:43

## 2023-08-13 RX ADMIN — FOLIC ACID: 5 INJECTION, SOLUTION INTRAMUSCULAR; INTRAVENOUS; SUBCUTANEOUS at 08:43

## 2023-08-13 NOTE — ASSESSMENT & PLAN NOTE
- Chronic issue dilated cardiomyopathy  -Last echo 2 months ago with EF of 45%  - Does have a history of SVT associated with this  - Encourage outpatient PCP

## 2023-08-13 NOTE — ASSESSMENT & PLAN NOTE
· Patient with a history of daily alcohol use  · Last drink 1300 hrs. 8/12/2023  · Tolerated SEWS. Received 585mg phenobarbital. Withdrawal adequately managed.

## 2023-08-13 NOTE — DISCHARGE SUMMARY
MEDICAL DETOX UNIT, LEVEL 4  Department of Medical Toxicology  Reason for Admission/Principal Problem: Medical detox of alcohol withdrawal  Admitting provider: LILA Groves MD   8/12/2023  8:43 PM       Discharging Physician / Practitioner: LILA Groves  PCP: Ester Koch MD  Admission Date:   Admission Orders (From admission, onward)     Ordered        08/12/23 2107  Inpatient Admission  Once            08/12/23 2105  Inpatient Admission  Once                      Discharge Date: 08/14/23    Medical Problems     Resolved Problems  Date Reviewed: 4/18/2023          Resolved    Alcohol withdrawal syndrome with complication ([address]) 3/45/9482     Resolved by  LILA Groves          * Alcohol use disorder  Assessment & Plan  - Began noticing symptoms of withdrawal 2 weeks ago, and attempted to continue to self medicate. - Last drink was 812 at 1300 hrs. - Does not wish to be discharged on medications: declines resuming venlafaxine, declines starting naltrexone   - Case management consult, prefers outpatient as she wants to tend to her pending home eviction and sick father     Passive suicidal ideations  Assessment & Plan  - Not active suicidal ideations but passive. Vocalizes thoughts of wishing she was dead. Denies plan. - Psych consult placed. Was started on lexapro. Cleared for DC   - Encourage outpatient PCP/psychiatry     Hyperlipidemia  Assessment & Plan  - Chronic, continue home medications    Anxiety  Assessment & Plan  - Chronic use of benzodiazepines including Xanax and Ativan.   - During the withdrawal process we will hold benzodiazepines, and encourage cessation of use as safe and appropriate after treatment with phenobarbital   - Starting an adjunct such as hydroxyzine and gabapentin as needed  - Psych consulted and started lexapro    Dilated cardiomyopathy ([address])  Assessment & Plan  - Chronic issue dilated cardiomyopathy  -Last echo 2 months ago with EF of 45%  - Does have a history of SVT associated with this  - Encourage outpatient PCP     Hypothyroidism  Assessment & Plan  - Continue her levothyroxine    Alcohol withdrawal syndrome with complication (HCC)-resolved as of 8/14/2023  Assessment & Plan  · Patient with a history of daily alcohol use  · Last drink 1300 hrs. 8/12/2023  · Tolerated SEWS. Received 585mg phenobarbital. Withdrawal adequately managed. Consultations During Hospital Stay:  · Psychiatry  · Case management    Procedures Performed:   · Medical management of alcohol detox    Significant Findings / Test Results:   · AST Elevation    Incidental Findings:   · None    Test Results Pending at Discharge (will require follow up): · None     Outpatient Tests Requested:  · None    Complications:  None    Reason for Admission: Medical management of alcohol withdrawal    Hospital Course: Tiffany Morales is a 47 y.o. female patient who originally presented to the hospital on 8/12/2023 due to alcohol withdrawal. Patient initially presented to the  ED requesting detoxification from alcohol. Patient was admitted to the [facility] medical detox unit under SEWS protocol for medically assisted alcohol withdrawal and received a total of 584.8 mg phenobarbital without complication. Patient's alcohol withdrawal symptoms subsequently resolved, and she has remained without objective evidence of alcohol withdrawal at this time. During this hospitalization, patient was found to have elevated AST, which has improved with fluid therapy. Case management was consulted for assistance with aftercare resources, and patient will be discharged . Please see above list of diagnoses and related plan for additional information. Condition at Discharge: good     Discharge Day Visit / Exam:     Subjective:  Patient resting in bed. Denies any acute complaints other than baseline anxiety.    Vitals: Blood Pressure: 121/68 (08/14/23 0802)  Pulse: 87 (08/14/23 0802)  Temperature: 98.3 °F (36.8 °C) (08/14/23 0515)  Temp Source: Temporal (08/14/23 0515)  Respirations: 17 (08/14/23 0802)  Height: 5' 9" (175.3 cm) (08/12/23 2055)  Weight - Scale: 61.2 kg (135 lb) (08/12/23 2055)  SpO2: 99 % (08/14/23 0802)  Exam:   Physical Exam  Vitals and nursing note reviewed. Constitutional:       Appearance: Normal appearance. HENT:      Head: Normocephalic. Eyes:      Conjunctiva/sclera: Conjunctivae normal.   Cardiovascular:      Rate and Rhythm: Normal rate and regular rhythm. Pulses: Normal pulses. Pulmonary:      Effort: Pulmonary effort is normal. No respiratory distress. Abdominal:      Palpations: Abdomen is soft. Tenderness: There is no abdominal tenderness. Musculoskeletal:         General: Normal range of motion. Cervical back: Normal range of motion. Skin:     General: Skin is warm. Neurological:      General: No focal deficit present. Mental Status: She is alert and oriented to person, place, and time. Psychiatric:         Attention and Perception: Attention normal.         Mood and Affect: Mood is anxious. Speech: Speech normal.         Behavior: Behavior normal.         Thought Content: Thought content does not include suicidal plan. Discussion with Family: patient     Discharge instructions/Information to patient and family:   See after visit summary for information provided to patient and family. Provisions for Follow-Up Care:  See after visit summary for information related to follow-up care and any pertinent home health orders. Disposition:     Home    For Discharges to [facility] SNF:   · Not Applicable to this Patient - Not Applicable to this Patient    Planned Readmission: N/A     Discharge Statement:  I spent 35 minutes discharging the patient. This time was spent on the day of discharge. I had direct contact with the patient on the day of discharge.  Greater than 50% of the total time was spent examining patient, answering all patient questions, arranging and discussing plan of care with patient as well as directly providing post-discharge instructions. Additional time then spent on discharge activities. Discharge Medications:  See after visit summary for reconciled discharge medications provided to patient and family.       ** Please Note: This note has been constructed using a voice recognition system **

## 2023-08-13 NOTE — PROGRESS NOTES
PROGRESS NOTE  DEPARTMENT OF MEDICAL TOXICOLOGY  LEVEL 4 MEDICAL DETOX UNIT  Miguel Cleary 47 y.o. female MRN: [id number]  Unit/Bed#: 5T DETOX 505-01 Encounter: [id number]      Reason for Admission/Principal Problem: Alcohol withdrawal  Rounding Provider: Serenity Park DO  Attending Provider: Serenity Park DO   8/12/2023  8:43 PM           Passive suicidal ideations  Assessment & Plan  - Not active suicidal ideations but passive. Vocalizes thoughts of wishing she was dead. Denies plan. - Psych consult placed  - Low risk: Virtual one-to-one    Alcohol use disorder  Assessment & Plan  - Began noticing symptoms of withdrawal 2 weeks ago, and attempted to continue to self medicate. - Last drink was 812 at 1300 hrs. - Does not wish to be discharged on medications: declines resuming venlafaxine, declines starting naltrexone   - Case management consult, prefers outpatient as she wants to tend to her pending home eviction and sick father     Hyperlipidemia  Assessment & Plan  - Chronic, continue home medications    Anxiety  Assessment & Plan  - Chronic use of benzodiazepines including Xanax and Ativan. - During the withdrawal process we will hold benzodiazepines, and encourage cessation of use as safe and appropriate after treatment with phenobarbital   - Starting an adjunct such as hydroxyzine and gabapentin as needed  - Psych consult is in place due to passive SI; however, was intoxicated when made these statements and now denies any plan.      Dilated cardiomyopathy ([address])  Assessment & Plan  - Chronic issue dilated cardiomyopathy  -Last echo 2 months ago with EF of 45%  - Does have a history of SVT associated with this  -Currently no related symptoms  -We will continue her home medications and monitor    Hypothyroidism  Assessment & Plan  - Continue her levothyroxine    * Alcohol withdrawal syndrome with complication ([address])  Assessment & Plan  · Patient with a history of daily alcohol use  · Last drink 1300 hrs. 8/12/2023  · Serum alcohol 251 in the ED, should be fully metabolized by now. · Received 1 mg Ativan and 50 Librium in the ED PTA  · Continue SEWS protocol for medical management of alcohol withdrawal  · Improving  SEWS Total Score: 6 (8/13/2023 11:40 AM)  ·     · Continue monitoring under protocol and administer phenobarbital as indicated  · Continuous pulse ox and telemetry monitoring          VTE Pharmacologic Prophylaxis:   Pharmacologic: Enoxaparin (Lovenox)  Mechanical VTE Prophylaxis in Place: no    Code Status: Level 1 - Full Code    Patient Centered Rounds: I have performed bedside rounds with nursing staff today. Discussions with Specialists or Other Care Team Provider: Psychiatry, case management, nursing    Education and Discussions with Family / Patient: Patient    Time Spent for Care: 30 minutes. More than 50% of total time spent on counseling and coordination of care as described above. Current Length of Stay: 1 day(s)    Current Patient Status: Inpatient     Certification Statement: The patient will continue to require additional inpatient hospital stay due to Continue stabilization of alcohol withdrawal Discharge Plan: Pending case management. Likely tomorrow. Subjective:   Feels anxious right now. She denies any SI. She admits to passive SI yesterday while probably intoxicated in the emergency department. She declines any medications at this time. She is eating well.       Objective:         SEWS Total Score: 6 (8/13/2023 11:40 AM)        Last 24 Hours Medication List:   Current Facility-Administered Medications   Medication Dose Route Frequency Provider Last Rate   • acetaminophen  650 mg Oral Q6H PRN Riri Campo PA-C     • aspirin  81 mg Oral Daily Shadoe [employer], [place]     • atorvastatin  20 mg Oral Daily With [address] [place], [place]     • enoxaparin  40 mg Subcutaneous Daily Eliana Benson PA-C     • folic acid 1 mg, thiamine (VITAMIN B1) 100 mg in sodium chloride 0.9 % 100 mL IV piggyback   Intravenous Daily Gita Villagomez PA-C     • hydrOXYzine HCL  25 mg Oral Q6H PRN Gita Villagomez PA-C     • levothyroxine  25 mcg Oral Daily Eliana Cervantes, [employer]     • losartan  50 mg Oral QAM Kimaniniraj Vazquez COREY Cervantes     • metoprolol succinate  100 mg Oral Daily Eliana Vazquez Helen [employer]     • metoprolol succinate  25 mg Oral Daily Eliana Noerhonda Cervantes PA-C     • ondansetron  4 mg Intravenous Q6H PRN Gita Villagomez PA-C     • sodium chloride  100 mL/hr Intravenous Continuous Gita Villagomez PA-C     • sodium chloride  100 mL/hr Intravenous Continuous Gita Villagomez PA-C 100 mL/hr (23)   • venlafaxine  37.5 mg Oral Daily With Breakfast Eliana Cervantes PA-C           Vitals:   Temp (24hrs), Av.3 °F (36.3 °C), Min:96.7 °F (35.9 °C), Max:97.7 °F (36.5 °C)    Temp:  [96.7 °F (35.9 °C)-97.7 °F (36.5 °C)] 97.6 °F (36.4 °C)  HR:  [] 105  Resp:  [18] 18  BP: (113-138)/(54-68) 131/67  SpO2:  [96 %-100 %] 99 %  Body mass index is 19.94 kg/m². Input and Output Summary (last 24 hours):No intake or output data in the 24 hours ending 23 1223    Physical Exam:   Physical Exam  Vitals and nursing note reviewed. Constitutional:       Appearance: Normal appearance. HENT:      Head: Normocephalic. Nose: Nose normal.      Mouth/Throat:      Mouth: Mucous membranes are moist.   Eyes:      Extraocular Movements: Extraocular movements intact. Conjunctiva/sclera: Conjunctivae normal.      Pupils: Pupils are equal, round, and reactive to light. Cardiovascular:      Rate and Rhythm: Normal rate and regular rhythm. Pulses: Normal pulses. Pulmonary:      Effort: Pulmonary effort is normal.      Breath sounds: Normal breath sounds. Abdominal:      General: Abdomen is flat. Palpations: Abdomen is soft.       Tenderness: There is no abdominal tenderness. Musculoskeletal:         General: Normal range of motion. Cervical back: Normal range of motion. Skin:     General: Skin is warm. Neurological:      General: No focal deficit present. Mental Status: She is alert and oriented to person, place, and time. Psychiatric:         Attention and Perception: Attention normal.         Mood and Affect: Mood is depressed. Speech: Speech normal.         Behavior: Behavior normal.         Thought Content: Thought content does not include suicidal plan. Cognition and Memory: Cognition normal.         Judgment: Judgment normal.         Additional Data:     Labs:   Results from last 7 days   Lab Units 08/13/23  0745   WBC Thousand/uL 3.55*   HEMOGLOBIN g/dL 11.8   HEMATOCRIT % 36.3   PLATELETS Thousands/uL 170   NEUTROS PCT % 68   LYMPHS PCT % 15   MONOS PCT % 11   EOS PCT % 5      Results from last 7 days   Lab Units 08/13/23  0745 08/12/23  1408   SODIUM mmol/L  --  136   POTASSIUM mmol/L  --  4.1   CHLORIDE mmol/L  --  101   CO2 mmol/L  --  25   BUN mg/dL  --  14   CREATININE mg/dL  --  0.79   ANION GAP mmol/L  --  10   CALCIUM mg/dL  --  9.5   ALBUMIN g/dL 4.0 4.6   TOTAL BILIRUBIN mg/dL 0.67 0.45   ALK PHOS U/L 54 64   ALT U/L 34 37   AST U/L 44* 50*   GLUCOSE RANDOM mg/dL  --  103                              * I Have Reviewed All Lab Data Listed Above. * Additional Pertinent Lab Tests Reviewed: [facility] Admission Reviewed        Today, Patient Was Seen By: Huyen Saldana DO    ** Please Note: Dictation voice to text software may have been used in the creation of this document.  **

## 2023-08-13 NOTE — PLAN OF CARE
Problem: COPING  Goal: Pt/Family able to verbalize concerns and demonstrate effective coping strategies  Description: INTERVENTIONS:  - Assist patient/family to identify coping skills, available support systems and cultural and spiritual values  - Provide emotional support, including active listening and acknowledgement of concerns of patient and caregivers  - Reduce environmental stimuli, as able  - Provide patient education  - Assess for spiritual pain/suffering and initiate spiritual care, including notification of Pastoral Care or [facility] based community as needed  - Assess effectiveness of coping strategies  Outcome: Progressing  Goal: Will report anxiety at manageable levels  Description: INTERVENTIONS:  - Administer medication as ordered  - Teach and encourage coping skills  - Provide emotional support  - Assess patient/family for anxiety and ability to cope  Outcome: Progressing     Problem: SPIRITUAL CARE  Goal: Patient feels balance and connection with others and/or higher power that empowers the self during times of loss, guilt and fear  Description: INTERVENTIONS:  - Create safety for patient through empathic presence and non-judgmental listening  - Encourage patient to explore his/her values, beliefs and/or spiritual images and practices  - Encourage use of breath work, imagery, meditation, relaxation, reiki to ease distress and provide healing  - Encourage use of cultural and spiritual celebrations and rituals  - Facilitate discussion that helps patient sort out spiritual concerns  - Help patient identify where meaning/hope/comfort & strength are in his/her life  - Refer patient to [facility] community for assistance, as appropriate  - Respond to patient/family need for prayer/ritual/sacrament/ceremony  Outcome: Progressing     Problem: SUBSTANCE USE/ABUSE  Goal: By discharge, will develop insight into their chemical dependency and sustain motivation to continue in recovery  Description: INTERVENTIONS:  - Attends all daily group sessions and scheduled AA groups  - Actively practices coping skills through participation in the therapeutic community and adherence to program rules  - Reviews and completes assignments from individual treatment plan  - Assist patient development of understanding of their personal cycle of addiction and relapse triggers  Outcome: Progressing  Goal: By discharge, patient will have ongoing treatment plan addressing chemical dependency  Description: INTERVENTIONS:  - Assist patient with resources and/or appointments for ongoing recovery based living  Outcome: Progressing
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
## 2023-08-13 NOTE — QUICK NOTE
Patient reviewed with psychiatry team (Dr. Charo Berry and Dr. Matthew Bass). Per psychiatry, can discontinue virtual observation at this time.

## 2023-08-13 NOTE — ASSESSMENT & PLAN NOTE
- Began noticing symptoms of withdrawal 2 weeks ago, and attempted to continue to self medicate. - Last drink was 812 at 1300 hrs.   - Does not wish to be discharged on medications: declines resuming venlafaxine, declines starting naltrexone   - Case management consult, prefers outpatient as she wants to tend to her pending home eviction and sick father

## 2023-08-13 NOTE — ASSESSMENT & PLAN NOTE
- Chronic use of benzodiazepines including Xanax and Ativan.   - During the withdrawal process we will hold benzodiazepines, and encourage cessation of use as safe and appropriate after treatment with phenobarbital   - Starting an adjunct such as hydroxyzine and gabapentin as needed  - Psych consulted and started lexapro

## 2023-08-13 NOTE — ASSESSMENT & PLAN NOTE
- Not active suicidal ideations but passive. Vocalizes thoughts of wishing she was dead. Denies plan. - Psych consult placed. Was started on lexapro.  Cleared for DC   - Encourage outpatient PCP/psychiatry

## 2023-08-13 NOTE — PROGRESS NOTES
08/13/23 [facility]   Patient Information   Mental Status Alert   Primary Caregiver Self   Support System Immediate family   Amish/Cultural Requests Spiritual   Legal Information   Legal Issues denies current; hx probation approx 6 yrs ago for unpaid fines   Advance Directives   (none)   Advance Directives Status Discussed - Patient/Family Declined   Activities of Daily Living Prior to Admission   Functional Status Independent   Assistive Device No device needed   Living Arrangement House;Lives with someone  (lives with father; caregiver of father)   Ambulation [address] Unemployed  (living off savings; unemployed since December)   [employer] of Transport to Appts: Drives Self     Pt is a 47 yr old  female who admitted to the detox unit for alcohol withdrawal. Pt had presented to the [facility] ED requesting detoxification. Pt's name, address, date of birth and telephone number were confirmed with patient. Pt was informed of case management role and the purpose of completion of intake. Pt states she lives in her own home with her father (79 yo) whom she takes care of; sister currently providing care while pt is hospitalized. 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 Pt was cooperative, pleasant. Pt reports she is a "casual drinker" usually only drinking 1-2 drinks/daily but recently relapsed with binge drinking in the last 2 weeks using 1/2-1 bottle of wine daily, last use: date of admission. Age of first use: 13 yo with age of problematic use being 53 yo. Longest sobriety: 7 yrs.  Also admits hx Heroin use via IV - last use approx 10 yrs ago. Pt denies current drug and tobacco use. Pt admits hx inpt rehab approx 10 yrs ago in [address] for her heroin addiction. Pt has hx Methadone and Suboxone tx. Pt reports history of withdrawal symptoms to include anxiety, sweats. Pt not currently experiencing any withdrawal symptoms. Pt denies any hx withdrawal seizures or hallucinations. Pt denies hx overdose. Pt admits hx family substance abuse to include her brother - alcohol. AUDIT: 30  PAWSS: 4  UDS: negative    Ethanol in ER: 32     Pt admits hx anxiety. Pt states her primary care provider prescribes xanax but pt recently stopped taking it "because I don't want to be on drugs anymore."  Pt denies current SI/HI/AH/VH and hx suicide attempt. Pt admits hx family mental health to include brother and sister. Pt brother completed suicide via overdose using drugs and alcohol then driving, father attempted via O/D - not completed. Pt has chronic medical conditions including hyperlipidemia, cardiomyopathy and hypothyroidism and follows up with Dr Umair Barba MD; declined [facility]. Pt has current health insurance, [facility] (TRACI signed) and preferred pharmacy is [address] [place] [address]  [phone]. Pt denies current legal issues but admits hx probation due to unpaid fines (approx 6 yrs ago). Pt denies access to firearms. 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 Pt currently living off of her savings and has no financial concerns or food insecurity at this time. Pt has no children and is . Pt completed h/s and some college - no degree, was studying nursing; no  hx. Pt supports include her sister Que Skinner, her friend Dottie Lux and her AA friends. Pt has reliable transportation and drives herself to appts. Pt did not sign any ROIs for family/friends.      Pt and SW completed relapse prevention plan, signed and a copy was provided to pt. Pt states she is interested in outpt services in [facility] and will return to [address] meetings upon discharge. Pt presents in the pre-contemplation stage of change.
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
## 2023-08-14 VITALS
DIASTOLIC BLOOD PRESSURE: 68 MMHG | OXYGEN SATURATION: 99 % | WEIGHT: 135 LBS | RESPIRATION RATE: 17 BRPM | TEMPERATURE: 98.3 F | BODY MASS INDEX: 19.99 KG/M2 | SYSTOLIC BLOOD PRESSURE: 121 MMHG | HEART RATE: 87 BPM | HEIGHT: 69 IN

## 2023-08-14 PROBLEM — F10.939 ALCOHOL WITHDRAWAL SYNDROME WITH COMPLICATION (HCC): Status: RESOLVED | Noted: 2023-08-12 | Resolved: 2023-08-14

## 2023-08-14 LAB
ALBUMIN SERPL BCP-MCNC: 3.8 G/DL (ref 3.5–5)
ALP SERPL-CCNC: 49 U/L (ref 34–104)
ALT SERPL W P-5'-P-CCNC: 26 U/L (ref 7–52)
ANION GAP SERPL CALCULATED.3IONS-SCNC: 8 MMOL/L
AST SERPL W P-5'-P-CCNC: 31 U/L (ref 13–39)
BILIRUB SERPL-MCNC: 0.52 MG/DL (ref 0.2–1)
BUN SERPL-MCNC: 15 MG/DL (ref 5–25)
CALCIUM SERPL-MCNC: 8.9 MG/DL (ref 8.4–10.2)
CHLORIDE SERPL-SCNC: 103 MMOL/L (ref 96–108)
CO2 SERPL-SCNC: 26 MMOL/L (ref 21–32)
CREAT SERPL-MCNC: 0.68 MG/DL (ref 0.6–1.3)
ERYTHROCYTE [DISTWIDTH] IN BLOOD BY AUTOMATED COUNT: 12.6 % (ref 11.6–15.1)
GFR SERPL CREATININE-BSD FRML MDRD: 100 ML/MIN/1.73SQ M
GLUCOSE SERPL-MCNC: 100 MG/DL (ref 65–140)
HCT VFR BLD AUTO: 36.4 % (ref 34.8–46.1)
HGB BLD-MCNC: 11.9 G/DL (ref 11.5–15.4)
MAGNESIUM SERPL-MCNC: 1.9 MG/DL (ref 1.9–2.7)
MCH RBC QN AUTO: 31.9 PG (ref 26.8–34.3)
MCHC RBC AUTO-ENTMCNC: 32.7 G/DL (ref 31.4–37.4)
MCV RBC AUTO: 98 FL (ref 82–98)
PLATELET # BLD AUTO: 169 THOUSANDS/UL (ref 149–390)
PMV BLD AUTO: 9.4 FL (ref 8.9–12.7)
POTASSIUM SERPL-SCNC: 3.9 MMOL/L (ref 3.5–5.3)
PROT SERPL-MCNC: 6.2 G/DL (ref 6.4–8.4)
RBC # BLD AUTO: 3.73 MILLION/UL (ref 3.81–5.12)
SODIUM SERPL-SCNC: 137 MMOL/L (ref 135–147)
WBC # BLD AUTO: 2.83 THOUSAND/UL (ref 4.31–10.16)

## 2023-08-14 PROCEDURE — 99232 SBSQ HOSP IP/OBS MODERATE 35: CPT | Performed by: PSYCHIATRY & NEUROLOGY

## 2023-08-14 PROCEDURE — 99239 HOSP IP/OBS DSCHRG MGMT >30: CPT | Performed by: NURSE PRACTITIONER

## 2023-08-14 PROCEDURE — 80053 COMPREHEN METABOLIC PANEL: CPT | Performed by: PHYSICIAN ASSISTANT

## 2023-08-14 PROCEDURE — 83735 ASSAY OF MAGNESIUM: CPT | Performed by: PHYSICIAN ASSISTANT

## 2023-08-14 PROCEDURE — 85027 COMPLETE CBC AUTOMATED: CPT | Performed by: PHYSICIAN ASSISTANT

## 2023-08-14 RX ORDER — LANOLIN ALCOHOL/MO/W.PET/CERES
100 CREAM (GRAM) TOPICAL DAILY
Qty: 5 TABLET | Refills: 0 | Status: SHIPPED | OUTPATIENT
Start: 2023-08-14 | End: 2023-08-19

## 2023-08-14 RX ORDER — HYDROXYZINE HYDROCHLORIDE 25 MG/1
25 TABLET, FILM COATED ORAL 3 TIMES DAILY
Qty: 90 TABLET | Refills: 0 | Status: SHIPPED | OUTPATIENT
Start: 2023-08-14 | End: 2023-09-13

## 2023-08-14 RX ORDER — ESCITALOPRAM OXALATE 5 MG/1
5 TABLET ORAL DAILY
Qty: 30 TABLET | Refills: 0 | Status: SHIPPED | OUTPATIENT
Start: 2023-08-14 | End: 2023-09-13

## 2023-08-14 RX ORDER — FOLIC ACID 1 MG/1
1 TABLET ORAL DAILY
Qty: 7 TABLET | Refills: 0 | Status: SHIPPED | OUTPATIENT
Start: 2023-08-14 | End: 2023-08-21

## 2023-08-14 RX ADMIN — HYDROXYZINE HYDROCHLORIDE 25 MG: 25 TABLET ORAL at 05:10

## 2023-08-14 RX ADMIN — LEVOTHYROXINE SODIUM 25 MCG: 50 TABLET ORAL at 05:15

## 2023-08-14 RX ADMIN — HYDROXYZINE HYDROCHLORIDE 25 MG: 25 TABLET ORAL at 10:09

## 2023-08-14 RX ADMIN — METOPROLOL SUCCINATE 25 MG: 25 TABLET, EXTENDED RELEASE ORAL at 08:07

## 2023-08-14 RX ADMIN — LOSARTAN POTASSIUM 50 MG: 50 TABLET, FILM COATED ORAL at 08:07

## 2023-08-14 RX ADMIN — ESCITALOPRAM 5 MG: 5 TABLET, FILM COATED ORAL at 08:07

## 2023-08-14 RX ADMIN — METOPROLOL SUCCINATE 100 MG: 50 TABLET, EXTENDED RELEASE ORAL at 08:07

## 2023-08-14 RX ADMIN — THIAMINE HCL TAB 100 MG 100 MG: 100 TAB at 08:07

## 2023-08-14 RX ADMIN — ASPIRIN 81 MG 81 MG: 81 TABLET ORAL at 08:07

## 2023-08-14 RX ADMIN — LOPERAMIDE HYDROCHLORIDE 4 MG: 2 CAPSULE ORAL at 11:54

## 2023-08-14 RX ADMIN — FOLIC ACID 1 MG: 1 TABLET ORAL at 08:07

## 2023-08-14 NOTE — NURSING NOTE
Patient discharged, IVs removed, belongings accounted for. AVS reviewed with patient, questions answered and understanding stated.  Patient ambulated to lobby with RN for [facility]

## 2023-08-14 NOTE — CASE MANAGEMENT
Case Management Discharge Planning Note    Patient name Georgann Runner  Location 5T DETOX 505/5T DETOX 48* MRN [id number]  : 1970 Date 2023       Current Admission Date: 2023  Current Admission Diagnosis:Alcohol withdrawal syndrome with complication [facility])   Patient Active Problem List    Diagnosis Date Noted   • Alcohol withdrawal syndrome with complication ([address])    • Alcohol use disorder 2023   • Passive suicidal ideations 2023   • Vitamin B 12 deficiency 2023   • Moderate depressive episode 2023   • Hepatitis C antibody test positive 2023   • Hyperlipidemia 2023   • Anxiety 2022   • Chronic combined systolic and diastolic CHF (congestive heart failure) ([address]) 2022   • Hypercalcemia 2021   • Weight loss 2021   • Other fatigue 2021   • Nonischemic cardiomyopathy ([address]) 2021   • S/P catheter ablation of slow pathway 2020   • Dilated cardiomyopathy ([address]) 2020   • Palpitations 2020   • Abnormal EKG 2020   • Hypothyroidism 2020   • Sinus tachycardia 2020   • Paroxysmal supraventricular tachycardia ([address]) 03/10/2020      LOS (days): 2  Geometric Mean LOS (GMLOS) (days):   Days to GMLOS:     OBJECTIVE:  Risk of Unplanned Readmission Score: 8.78         Current admission status: Inpatient   Preferred Pharmacy:   [address] [place] [address], [facility] ([address] [facility])  [address] ([address])  [address] [facility] [address]  Phone: [phone] Fax: [phone] Latoya Miranda, [address]  [address] [place] [address]  Phone: [phone] Fax: [phone]    [facility] [address]  [address]  [address] [facility] [address]  Phone: [phone] Fax: [phone]    Primary Care Provider: Armin Girard MD    Primary Insurance: [place] [facility]  Secondary Insurance: DISCHARGE DETAILS:CM consulted with medical staff who indicated pt ready for discharge today. Cm met with pt to discuss discharge planning. CM reviewed aftercare options and pt indicated she did not want any aftercare planning and wanted no one informed of her admission here. Cm reminded pt of her upcoming PCP appointment scheduled for next week. Pt requested transportation home and signed [employer] release form. Pt will be discharged home to [address] [place] [address] today with resources on AVS.    Discharge planning discussed with[de-identified] patient  Freedom of Choice: Yes                   Contacts  Patient Contacts: Pt requested no contacts at this time              Other Referral/Resources/Interventions Provided:  Referrals Provided[de-identified] Therapist, Support Group, IOP               Transport at Discharge : Automobile     Number/Name of Dispatcher: [facility]  Meaghan                          Family notified[de-identified] no supportive TRACI's signed

## 2023-08-14 NOTE — UTILIZATION REVIEW
Initial Clinical Review    Admission: Date/Time/Statement:   Admission Orders (From admission, onward)     Ordered        08/12/23 2105  Inpatient Admission  Once                      Orders Placed This Encounter   Procedures   • Inpatient Admission     Standing Status:   Standing     Number of Occurrences:   1     Order Specific Question:   Level of Care     Answer:   Med Surg [16]     Order Specific Question:   Estimated length of stay     Answer:   More than 2 Midnights     Order Specific Question:   Certification     Answer:   I certify that inpatient services are medically necessary for this patient for a duration of greater than two midnights. See H&P and MD Progress Notes for additional information about the patient's course of treatment. Initial Presentation: 47 y.o. female  who presented to [facility], was then transferred by EMS to [facility] as a direct admission to medical detox. Inpatient admission for evaluation and treatment of alcohol withdrawal syndrome. alcohol abuse disorder. PMHx:  has a past medical history of Anxiety, Crohn's disease ([address]), Disease of thyroid gland, ETOH abuse, Hepatitis C, Hypertension, Psychiatric disorder, and SVT (supraventricular tachycardia) ([address] Presented w/ request for detox from alcohol. Serum ETOH: 251. Reports very heavily, drinking 3 large cardboard boxes of wine a day. last drink on 8/12/23 @ 1300. Has no prior inpatient & no outpatient treatment for withdrawal. Reports no hx of withdrawal seizures. On exam, signs and symptoms of ethanol withdrawal: anxiety/fine tremor/nausea. Endorses anxiety and nausea and vomiting and passive suicidal ideations, states that she wishes she was gone, but no active thoughts of killing herself. SEWS 11. Plan: SEWS monitoring w/ phenobarbital management, thiamine/folic acid supplement, Tele monitoring; continuous pulse ox, trend labs, replete electrolytes as needed.  CM consulted; psyche consulted      Date: 8/13/23       Day 2: Pt reports Feels anxious right now. She denies any SI. On exam, anxious, nauseated, sweating, tremors, agitated. SEWS 13. Plan: continue SEWS monitoring w/ phenobarbital management, thiamine/folic acid supplement, telemetry, continuous pulse ox, continue home meds, trend labs, replete electrolytes as needed.  During the withdrawal process we will hold benzodiazepines, and encourage cessation of use as safe and appropriate after treatment with phenobarbital;  Starting an adjunct such as hydroxyzine and gabapentin as needed        ED Triage Vitals   Temperature Pulse Respirations Blood Pressure SpO2   08/12/23 2055 08/12/23 2055 08/12/23 2055 08/12/23 2055 08/12/23 2055   (!) 97.1 °F (36.2 °C) 92 18 121/68 97 %      Temp Source Heart Rate Source Patient Position - Orthostatic VS BP Location FiO2 (%)   08/12/23 2055 08/12/23 2055 08/12/23 2055 08/12/23 2055 --   Temporal Monitor Sitting Right arm       Pain Score       08/12/23 2102       No Pain          Wt Readings from Last 1 Encounters:   08/12/23 61.2 kg (135 lb)     Additional Vital Signs:   Date/Time Temp Pulse Resp BP MAP (mmHg) SpO2 O2 Device Patient Position - Orthostatic VS   08/14/23 0802 -- 87 17 121/68 -- 99 % None (Room air) Lying   08/14/23 0515 98.3 °F (36.8 °C) 84 16 116/56 -- 100 % None (Room air) Lying   08/13/23 2021 -- 105 16 96/56 -- 99 % None (Room air) Lying   08/13/23 1500 -- 76 18 -- -- 98 % None (Room air) --   08/13/23 1139 -- 105 18 131/67 -- 99 % None (Room air) Lying   08/13/23 0842 97.6 °F (36.4 °C) 94 18 113/54 -- 100 % None (Room air) Lying   08/13/23 0602 96.7 °F (35.9 °C) Abnormal  100 18 115/67 83 99 % None (Room air) Sitting   08/12/23 2104 -- -- -- -- -- 97 % None (Room air) --   08/12/23 2055 97.1 °F (36.2 °C) Abnormal  92 18 121/68 85 97 % None (Room air) Sitting         EKG:     Results from last 7 days   Lab Units 08/14/23  0507 08/13/23  0745 08/12/23  1408   WBC Thousand/uL 2.83* 3.55* 6.68   HEMOGLOBIN g/dL 11.9 11.8 12.2   HEMATOCRIT % 36.4 36.3 36.7   PLATELETS Thousands/uL 169 170 236   NEUTROS ABS Thousands/µL  --  2.42 4.26       Results from last 7 days   Lab Units 08/14/23  0507 08/13/23  0745 08/12/23  1408   SODIUM mmol/L 137  --  136   POTASSIUM mmol/L 3.9  --  4.1   CHLORIDE mmol/L 103  --  101   CO2 mmol/L 26  --  25   ANION GAP mmol/L 8  --  10   BUN mg/dL 15  --  14   CREATININE mg/dL 0.68  --  0.79   EGFR ml/min/1.73sq m 100  --  85   CALCIUM mg/dL 8.9  --  9.5   MAGNESIUM mg/dL 1.9 1.8*  --      Results from last 7 days   Lab Units 08/14/23  0507 08/13/23  0745 08/12/23  1408   AST U/L 31 44* 50*   ALT U/L 26 34 37   ALK PHOS U/L 49 54 64   TOTAL PROTEIN g/dL 6.2* 6.4 7.5   ALBUMIN g/dL 3.8 4.0 4.6   TOTAL BILIRUBIN mg/dL 0.52 0.67 0.45   BILIRUBIN DIRECT mg/dL  --  0.17  --        Results from last 7 days   Lab Units 08/14/23  0507 08/12/23  1408   GLUCOSE RANDOM mg/dL 100 103       Results from last 7 days   Lab Units 08/12/23  1412   CLARITY UA  Clear   COLOR UA  Light Yellow   SPEC GRAV UA  <=1.005   PH UA  5.0   GLUCOSE UA mg/dl Negative   KETONES UA mg/dl Negative   BLOOD UA  Small*   PROTEIN UA mg/dl Negative   NITRITE UA  Negative   BILIRUBIN UA  Negative   UROBILINOGEN UA E.U./dl 0.2   LEUKOCYTES UA  Negative   WBC UA /hpf None Seen   RBC UA /hpf 0-1*   BACTERIA UA /hpf Occasional   EPITHELIAL CELLS WET PREP /hpf Occasional       Results from last 7 days   Lab Units 08/12/23  1412   AMPH/METH  Negative   BARBITURATE UR  Negative   BENZODIAZEPINE UR  Negative   COCAINE UR  Negative   METHADONE URINE  Negative   OPIATE UR  Negative   PCP UR  Negative   THC UR  Negative     Results from last 7 days   Lab Units 08/12/23  1408   ETHANOL LVL mg/dL 251*     Present on Admission:  • Dilated cardiomyopathy ([address])  • Hypothyroidism  • Hyperlipidemia  • Anxiety  • Alcohol withdrawal syndrome with complication ([address])  • Alcohol use disorder      Admitting Diagnosis: Alcohol intoxication [facility]) [F10.929]     Age/Sex: 47 y.o. female     Admission Orders: SCDs; continuous pulse ox; cardiac diet    Scheduled Medications:  aspirin, 81 mg, Oral, Daily  atorvastatin, 20 mg, Oral, Daily With Dinner  enoxaparin, 40 mg, Subcutaneous, Daily  escitalopram, 5 mg, Oral, Daily  folic acid, 1 mg, Oral, Daily  levothyroxine, 25 mcg, Oral, Daily  losartan, 50 mg, Oral, QAM  metoprolol succinate, 100 mg, Oral, Daily  metoprolol succinate, 25 mg, Oral, Daily  thiamine, 100 mg, Oral, Daily    PHENobarbital injection 130 mg  Dose: 130 mg  Freq: Once Route: IV  Start: 08/13/23 0615 End: 08/13/23 0616    PHENobarbital injection 130 mg  Dose: 130 mg  Freq: Once Route: IV  Start: 08/13/23 1330 End: 08/13/23 1324      Continuous IV Infusions: none     PRN Meds:  acetaminophen, 650 mg, Oral, Q6H PRN  hydrOXYzine HCL, 25 mg, Oral, Q6H PRN  (8/13 rec'd x1)  (8/14 rec'd x2 so far today)   loperamide, 4 mg, Oral, 4x Daily PRN  (8/13 rec'd x1)   ondansetron, 4 mg, Intravenous, Q6H PRN  (8/13 rec'd x1)         IP CONSULT TO CASE MANAGEMENT  IP CONSULT TO PSYCHIATRY  IP CONSULT TO CASE MANAGEMENT  IP CONSULT TO PSYCHIATRY    Network Utilization Review Department  ATTENTION: Please call with any questions or concerns to [phone] and carefully listen to the prompts so that you are directed to the right person. All voicemails are confidential.  Zara Mathews all requests for admission clinical reviews, approved or denied determinations and any other requests to dedicated fax number below belonging to the campus where the patient is receiving treatment.  List of dedicated fax numbers for the Facilities:  [place] DENIALS (Administrative/Medical Necessity) [phone] [address] [facility] [address] [place] [address] [address] [facility] [address] [address] [facility] [address] [facility] [address] [place] [address] [phone]

## 2023-08-14 NOTE — CONSULTS
Psychiatric Progress Note - Department [facility] 47 y.o. female MRN: [id number]  Unit/Bed#: 5T DETOX 505-01 Encounter: [id number]    ASSESSMENT & PLAN     Principal Problem:    Alcohol withdrawal syndrome with complication [facility])  Active Problems:    Hypothyroidism    Dilated cardiomyopathy ([address])    Anxiety    Hyperlipidemia    Alcohol use disorder    Passive suicidal ideations    • Continue medical management per primary team.  • Collaborate with collaterals for baseline assessment and disposition. • Continue Lexapro 5 mg q.d. to address dysphoric mood; upward titration as necessary and as tolerated, although will defer to outpatient provider. • Presently, patient does not adhere to criteria for inpatient behavioral health admission. Patient is agreeable to attending her appointment next week with her PCP. • Discharge date per primary team.   • Consultation appreciated. Psychiatry to sign off. Please do not hesitate to call/contact our service with any additional comments/concerns. Please call/contact on-call Psychiatry via [phone] [facility] [address] including on-call psychiatric service via [address] [place] [address] 22 ([phone]) with any comments/concerns if after hours/Fri/Sat/Sunday. Thank you! SUBJECTIVE     Patient evaluated this a.m. for continuity of care; please refer to initial psychiatric consultation by Dr. Radha Joy and Mila Cunningham on 08/13. Patient was discussed at length with nursing and treatment team. No acute distress is noted throughout evaluation. Nneka Duncan denies suicidal/homicidal ideation in addition to thoughts of self-injury, citing her father and sister as deterrents against self-injury, receptive to reiteration of crisis plannin (see below). She denies dysphoric mood including depression and anxiety, remaining forward thinking to involvement regarding appropriate outpatient resources.  Based on today's assessment and clinical criteria, Nneka Duncan contracts for safety and is not an imminent risk of harm to self or others. Outpatient level of care is deemed appropriate at this present time. Alroy Means understands that if they are no longer able to contract for safety, they need to call/contact their outpatient PCP office, call/contact crisis and/or attend to the nearest Emergency Department for immediate evaluation. PSYCHIATRIC REVIEW OF SYSTEMS     Behavior over the last 24 hours:  unchanged  Sleep: adequate  Appetite: adequate  Medication side effects: No    REVIEW OF SYSTEMS   Review of systems: no complaints    OBJECTIVE     Vital Signs in Past 24 Hours:  Temp:  [98.3 °F (36.8 °C)] 98.3 °F (36.8 °C)  HR:  [] 87  Resp:  [16-18] 17  BP: ()/(56-68) 121/68    Intake/Output in Past 24 hours:  No intake/output data recorded. No intake/output data recorded. Laboratory Results:    I have personally reviewed all pertinent laboratory/tests results.   Most Recent Labs:   Lab Results   Component Value Date    WBC 2.83 (L) 08/14/2023    RBC 3.73 (L) 08/14/2023    HGB 11.9 08/14/2023    HCT 36.4 08/14/2023     08/14/2023    RDW 12.6 08/14/2023    NEUTROABS 2.42 08/13/2023    SODIUM 137 08/14/2023    K 3.9 08/14/2023     08/14/2023    CO2 26 08/14/2023    BUN 15 08/14/2023    CREATININE 0.68 08/14/2023    GLUC 100 08/14/2023    CALCIUM 8.9 08/14/2023    AST 31 08/14/2023    ALT 26 08/14/2023    ALKPHOS 49 08/14/2023    TP 6.2 (L) 08/14/2023    ALB 3.8 08/14/2023    TBILI 0.52 08/14/2023    CHOLESTEROL 201 (H) 03/28/2023    HDL 97 03/28/2023    TRIG 78 03/28/2023    LDLCALC 90 03/28/2023    [id number] 5.939 (H) 07/23/2023    PREGUR Negative 12/30/2018    HGBA1C 5.6 10/25/2019     Labs in last 72 hours:   Recent Labs     08/13/23  0745 08/14/23  0507   WBC 3.55* 2.83*   RBC 3.72* 3.73*   HGB 11.8 11.9   HCT 36.3 36.4    169   RDW 12.6 12.6   NEUTROABS 2.42  --    SODIUM  --  137   K  --  3.9   CL  --  103   CO2  --  26   BUN  --  15   CREATININE  --  0.68   GLUC  -- 100   CALCIUM  --  8.9   AST 44* 31   ALT 34 26   ALKPHOS 54 49   TP 6.4 6.2*   ALB 4.0 3.8   TBILI 0.67 0.52     Admission Labs:   Admission on 08/12/2023   Component Date Value   • WBC 08/13/2023 3.55 (L)    • RBC 08/13/2023 3.72 (L)    • Hemoglobin 08/13/2023 11.8    • Hematocrit 08/13/2023 36.3    • MCV 08/13/2023 98    • MCH 08/13/2023 31.7    • MCHC 08/13/2023 32.5    • RDW 08/13/2023 12.6    • MPV 08/13/2023 9.0    • Platelets 87/49/0541 170    • nRBC 08/13/2023 0    • Neutrophils Relative 08/13/2023 68    • Immat GRANS % 08/13/2023 0    • Lymphocytes Relative 08/13/2023 15    • Monocytes Relative 08/13/2023 11    • Eosinophils Relative 08/13/2023 5    • Basophils Relative 08/13/2023 1    • Neutrophils Absolute 08/13/2023 2.42    • Immature Grans Absolute 08/13/2023 0.01    • Lymphocytes Absolute 08/13/2023 0.54 (L)    • Monocytes Absolute 08/13/2023 0.39    • Eosinophils Absolute 08/13/2023 0.16    • Basophils Absolute 08/13/2023 0.03    • Total Bilirubin 08/13/2023 0.67    • Bilirubin, Direct 08/13/2023 0.17    • Alkaline Phosphatase 08/13/2023 54    • AST 08/13/2023 44 (H)    • ALT 08/13/2023 34    • Total Protein 08/13/2023 6.4    • Albumin 08/13/2023 4.0    • Magnesium 08/13/2023 1.8 (L)    • Sodium 08/14/2023 137    • Potassium 08/14/2023 3.9    • Chloride 08/14/2023 103    • CO2 08/14/2023 26    • ANION GAP 08/14/2023 8    • BUN 08/14/2023 15    • Creatinine 08/14/2023 0.68    • Glucose 08/14/2023 100    • Calcium 08/14/2023 8.9    • AST 08/14/2023 31    • ALT 08/14/2023 26    • Alkaline Phosphatase 08/14/2023 49    • Total Protein 08/14/2023 6.2 (L)    • Albumin 08/14/2023 3.8    • Total Bilirubin 08/14/2023 0.52    • eGFR 08/14/2023 100    • WBC 08/14/2023 2.83 (L)    • RBC 08/14/2023 3.73 (L)    • Hemoglobin 08/14/2023 11.9    • Hematocrit 08/14/2023 36.4    • MCV 08/14/2023 98    • MCH 08/14/2023 31.9    • MCHC 08/14/2023 32.7    • RDW 08/14/2023 12.6    • Platelets 32/17/5932 169    • MPV 08/14/2023 9.4    • Magnesium 08/14/2023 1.9        Behavioral Health Medications:   all current active meds have been reviewed, continue current psychiatric medications and current meds:   Current Facility-Administered Medications   Medication Dose Route Frequency   • acetaminophen (TYLENOL) tablet 650 mg  650 mg Oral Q6H PRN   • aspirin chewable tablet 81 mg  81 mg Oral Daily   • atorvastatin (LIPITOR) tablet 20 mg  20 mg Oral Daily With Dinner   • enoxaparin (LOVENOX) subcutaneous injection 40 mg  40 mg Subcutaneous Daily   • escitalopram (LEXAPRO) tablet 5 mg  5 mg Oral Daily   • folic acid (FOLVITE) tablet 1 mg  1 mg Oral Daily   • hydrOXYzine HCL (ATARAX) tablet 25 mg  25 mg Oral Q6H PRN   • levothyroxine tablet 25 mcg  25 mcg Oral Daily   • loperamide (IMODIUM) capsule 4 mg  4 mg Oral 4x Daily PRN   • losartan (COZAAR) tablet 50 mg  50 mg Oral QAM   • metoprolol succinate (TOPROL-XL) 24 hr tablet 100 mg  100 mg Oral Daily   • metoprolol succinate (TOPROL-XL) 24 hr tablet 25 mg  25 mg Oral Daily   • ondansetron (ZOFRAN) injection 4 mg  4 mg Intravenous Q6H PRN   • thiamine tablet 100 mg  100 mg Oral Daily   .     Current Facility-Administered Medications   Medication Dose Route Frequency Provider Last Rate   • acetaminophen  650 mg Oral Q6H PRN Davidson Garcia PA-C     • aspirin  81 mg Oral Daily [facility] [place]     • atorvastatin  20 mg Oral Daily With [address] [place]     • enoxaparin  40 mg Subcutaneous Daily [facility] [place]     • escitalopram  5 mg Oral Daily Chino Pearson MD     • folic acid  1 mg Oral Daily Tita Gtz PA-C     • hydrOXYzine HCL  25 mg Oral Q6H PRN Davidson Garcia PA-C     • levothyroxine  25 mcg Oral Daily Eliana Don PA-C     • loperamide  4 mg Oral 4x Daily PRN Tita Gtz PA-C     • losartan  50 mg Oral QAM Eliana Don PA-C     • metoprolol succinate  100 mg Oral Daily Jerod Arredondo PA-C     • metoprolol succinate  25 mg Oral Daily Lisa Bailey     • ondansetron  4 mg Intravenous Q6H PRN Jerod Arredondo PA-C     • thiamine  100 mg Oral Daily Tita Gtz PA-C         Risks, benefits and possible side effects of Medications:   Risks, benefits, and possible side effects of medications explained to patient and patient verbalizes understanding. Mental Status Evaluation:  Appearance:  age appropriate, casually dressed and appropriate grooming/hygiene   Behavior:  calm, cooperative, appropriate eye contact   Speech:  normal pitch and normal volume   Mood:  euthymic; "OK"   Affect:  slightly constricted   Language naming objects and repeating phrases   Thought Process:  goal directed and linear   Thought Content:  no overt obsessions or delusions   Perceptual Disturbances: None not appearing internally preoccupied or distracted   Risk Potential: Suicidal Ideations none, Homicidal Ideations none and Potential for Aggression No   Sensorium:  person, place and time/date   Cognition:  recent and remote memory grossly intact   Consciousness:  alert and awake    Attention: attention span and concentration were age appropriate   Insight:  improved   Judgment: improved   Intellect    Gait/Station: not assessed; sitting in bed   Motor Activity: no abnormal movements     Memory: Short and long term memory  fair     Counseling/Coordination of Care    Total unit time spent today ws greater than 15 minutes. Greater than 50% of total time was spent with the patient and/or patient's relatives and/or coordination of patient's care. Patient's rights, confidentiality, exceptions to confidentiality, use of electronic medical record including appropriate staff access to medical record regarding behavioral health services and consent to treatment were reviewed. Note Share:      This note was not shared with the patient due to reasonable likelihood of causing patient harm     This note has been constructed using a voice recognition system. There may be translation, syntax, or grammatical errors. If you have any questions, please contact the dictating provider.     [address] [facility], DO

## 2023-08-14 NOTE — PLAN OF CARE
Problem: DISCHARGE PLANNING  Goal: Discharge to home or other facility with appropriate resources  Description: INTERVENTIONS:  - Identify barriers to discharge w/patient and caregiver  - Arrange for needed discharge resources and transportation as appropriate  - Identify discharge learning needs (meds, wound care, etc.)  - Arrange for interpretive services to assist at discharge as needed  - Refer to Case Management Department for coordinating discharge planning if the patient needs post-hospital services based on physician/advanced practitioner order or complex needs related to functional status, cognitive ability, or social support system  8/14/2023 0813 by Estelle Miller RN  Outcome: Progressing  8/14/2023 0813 by Estelle Miller RN  Outcome: Progressing     Problem: SUBSTANCE USE/ABUSE  Goal: By discharge, will develop insight into their chemical dependency and sustain motivation to continue in recovery  Description: INTERVENTIONS:  - Attends all daily group sessions and scheduled AA groups  - Actively practices coping skills through participation in the therapeutic community and adherence to program rules  - Reviews and completes assignments from individual treatment plan  - Assist patient development of understanding of their personal cycle of addiction and relapse triggers  8/14/2023 0813 by Estelle Miller RN  Outcome: Progressing  8/14/2023 0813 by Estelle Miller RN  Outcome: Progressing  Goal: By discharge, patient will have ongoing treatment plan addressing chemical dependency  Description: INTERVENTIONS:  - Assist patient with resources and/or appointments for ongoing recovery based living  8/14/2023 0813 by Estelle Miller RN  Outcome: Progressing  8/14/2023 0813 by Estelle Miller RN  Outcome: Progressing

## 2023-08-14 NOTE — PLAN OF CARE
Problem: PAIN - ADULT  Goal: Verbalizes/displays adequate comfort level or baseline comfort level  Description: Interventions:  - Encourage patient to monitor pain and request assistance  - Assess pain using appropriate pain scale  - Administer analgesics based on type and severity of pain and evaluate response  - Implement non-pharmacological measures as appropriate and evaluate response  - Consider cultural and social influences on pain and pain management  - Notify physician/advanced practitioner if interventions unsuccessful or patient reports new pain  Outcome: Progressing     Problem: SAFETY ADULT  Goal: Patient will remain free of falls  Description: INTERVENTIONS:  - Educate patient/family on patient safety including physical limitations  - Instruct patient to call for assistance with activity   - Consult OT/PT to assist with strengthening/mobility   - Keep Call bell within reach  - Keep bed low and locked with side rails adjusted as appropriate  - Keep care items and personal belongings within reach  - Initiate and maintain comfort rounds  - Make Fall Risk Sign visible to staff  - Apply yellow socks and bracelet for high fall risk patients  - Consider moving patient to room near nurses station  Outcome: Progressing     Problem: DISCHARGE PLANNING  Goal: Discharge to home or other facility with appropriate resources  Description: INTERVENTIONS:  - Identify barriers to discharge w/patient and caregiver  - Arrange for needed discharge resources and transportation as appropriate  - Identify discharge learning needs (meds, wound care, etc.)  - Arrange for interpretive services to assist at discharge as needed  - Refer to Case Management Department for coordinating discharge planning if the patient needs post-hospital services based on physician/advanced practitioner order or complex needs related to functional status, cognitive ability, or social support system  Outcome: Progressing     Problem: COPING  Goal: Pt/Family able to verbalize concerns and demonstrate effective coping strategies  Description: INTERVENTIONS:  - Assist patient/family to identify coping skills, available support systems and cultural and spiritual values  - Provide emotional support, including active listening and acknowledgement of concerns of patient and caregivers  - Reduce environmental stimuli, as able  - Provide patient education  - Assess for spiritual pain/suffering and initiate spiritual care, including notification of Pastoral Care or nelda based community as needed  - Assess effectiveness of coping strategies  Outcome: Progressing  Goal: Will report anxiety at manageable levels  Description: INTERVENTIONS:  - Administer medication as ordered  - Teach and encourage coping skills  - Provide emotional support  - Assess patient/family for anxiety and ability to cope  Outcome: Progressing     Problem: DECISION MAKING  Goal: Pt/Family able to effectively weigh alternatives and participate in decision making related to treatment and care  Description: INTERVENTIONS:  - Identify decision maker  - Determine when there are differences among patient's view, family's view, and healthcare provider's view of patient condition and care goals  - Facilitate patient/family articulation of goals for care  - Help patient/family identify pros/cons of alternative solutions  - Provide information as requested by patient/family  - Respect patient/family rights related to privacy and sharing information   - Serve as a liaison between patient, family and health care team  - Initiate consults as appropriate (Ethics Team, Palliative Care, Family Care Conference, etc.)  Outcome: Progressing     Problem: SPIRITUAL CARE  Goal: Patient feels balance and connection with others and/or higher power that empowers the self during times of loss, guilt and fear  Description: INTERVENTIONS:  - Create safety for patient through empathic presence and non-judgmental listening  - Encourage patient to explore his/her values, beliefs and/or spiritual images and practices  - Encourage use of breath work, imagery, meditation, relaxation, reiki to ease distress and provide healing  - Encourage use of cultural and spiritual celebrations and rituals  - Facilitate discussion that helps patient sort out spiritual concerns  - Help patient identify where meaning/hope/comfort & strength are in his/her life  - Refer patient to nelda community for assistance, as appropriate  - Respond to patient/family need for prayer/ritual/sacrament/ceremony  Outcome: Progressing     Problem: SUBSTANCE USE/ABUSE  Goal: By discharge, will develop insight into their chemical dependency and sustain motivation to continue in recovery  Description: INTERVENTIONS:  - Attends all daily group sessions and scheduled AA groups  - Actively practices coping skills through participation in the therapeutic community and adherence to program rules  - Reviews and completes assignments from individual treatment plan  - Assist patient development of understanding of their personal cycle of addiction and relapse triggers  Outcome: Progressing  Goal: By discharge, patient will have ongoing treatment plan addressing chemical dependency  Description: INTERVENTIONS:  - Assist patient with resources and/or appointments for ongoing recovery based living  Outcome: Progressing

## 2023-08-15 DIAGNOSIS — Z78.9 NEED FOR FOLLOW-UP BY SOCIAL WORKER: Primary | ICD-10-CM

## 2023-08-15 NOTE — UTILIZATION REVIEW
NOTIFICATION OF ADMISSION DISCHARGE   This is a Notification of Discharge from [facility]. Please be advised that this patient has been discharge from our facility. Below you will find the admission and discharge date and time including the patient’s disposition. UTILIZATION REVIEW CONTACT:  Sushil Frank MA  Utilization   Network Utilization Review Department  Phone: [phone] x carefully listen to the prompts. All voicemails are confidential.  Email: [email or web address]     ADMISSION INFORMATION  PRESENTATION DATE: 8/12/2023  8:43 PM  OBERVATION ADMISSION DATE:   INPATIENT ADMISSION DATE: 8/12/23  8:43 PM   DISCHARGE DATE: 8/14/2023  1:48 PM   DISPOSITION:Home/Self Care    IMPORTANT INFORMATION:  Send all requests for admission clinical reviews, approved or denied determinations and any other requests to dedicated fax number below belonging to the campus where the patient is receiving treatment.  List of dedicated fax numbers:  [place] DENIALS (Administrative/Medical Necessity) [phone] 2303 [facility] (Maternity/NICU/Pediatrics) [phone]   [facility] [phone]   [facility] [phone]   [address] [place] [address] [phone]   401 [facility] [phone]   [facility] [phone]   [address] [place] [address] 608 [facility] [phone]   506 [facility] [phone] 3441 [facility] [phone] 2720 [facility] [address] [facility] [phone]

## 2023-08-16 ENCOUNTER — PATIENT OUTREACH (OUTPATIENT)
Dept: FAMILY MEDICINE CLINIC | Facility: CLINIC | Age: 53
End: 2023-08-16

## 2023-08-16 NOTE — PROGRESS NOTES
[facility] had received a referral from Adiministrative Coordinator, Mony Kaur r/t JILLIAN f/u. [facility] had completed a chart review. Per order, patient was identified via High Risk Readmission report - recent d/c from [facility] detox unit, hx alcohol use disorder, passive SI. [facility] had spoke with patient back in March 2023. Please reference note for additional information. Per chart, patient lives with father who she is primary caregiver. Per chart, patient has her sister, Marlen Mayers and friend, Lisa Garcia are her main support. Per chart, patient is not working but was planning to apply for SS. Per chart, patient's father has SS and pension for income. Per chart, patient had money saved up but is running out. Per chart, patient drives herself around. Per chart, denied any food insecurities. Per chart, patient was speaking with  about eviction. Per chart, [facility] offered to have [facility] assist with rental assistance and housing programs but patient denied at the time. Per chart, patient aware of resources in the area for housing. Per chart, patient has been on section 8 waitlist for 2 years. Per chart, patient last drank alcohol was 8/12. Per chart, patient last used drugs was 10 years ago. Per chart, patient discharged home on 8/14. Per chart, patient was scheduled with PCP and given D&A resources for outpatient services. Per chart, patient will return to [address] [place] [address] meetings upon discharge. [facility] notes that patient would benefit from going to [facility] for methadone and suboxone tx. Per chart, patient use to do methadone and suboxone tx in the past. [facility] also notes that patient would benefit from [facility] [place] [facility] [phone] for therapy and/or psychiatry. [facility] had called the patient via phone. [facility] had left a voicemail with her name and number. [facility] had received return call from patient. [facility] will attempt to call again at a later date and time. [facility] will continue to be available.

## 2023-08-21 ENCOUNTER — PATIENT OUTREACH (OUTPATIENT)
Dept: FAMILY MEDICINE CLINIC | Facility: CLINIC | Age: 53
End: 2023-08-21

## 2023-08-21 DIAGNOSIS — Z59.819 HOUSING INSECURITY: Primary | ICD-10-CM

## 2023-08-21 SDOH — ECONOMIC STABILITY - HOUSING INSECURITY: HOUSING INSTABILITY UNSPECIFIED: Z59.819

## 2023-08-21 NOTE — PROGRESS NOTES
[facility] had called the patient via phone. [facility] had reintroduced herself and reason for consult. [facility] asked patient how she is doing since discharged from [facility] detox unit. Patient stated she is doing good. Patient stated she has not drank alcohol since 8/12. Patient stated she had binged drank for 2 weeks after getting off of psych medication. Patient stated she has not set up therapy and/or psychiatric services. [facility] went over the following for [address] [facility] services that would work with patient's insurance:    [facility] [address] [place] [address] [phone] [address] [facility],[address] [phone]    Patient stated she is back to her [address] [place] [address] meetings. [facility] discussed methadone and suboxone tx. Patient stated she use to do methadone and suboxone tx but had a really horrible withdrawal in the past. Patient stated she is considering vivitrol injections. Patient stated she was told by [facility] she needs to be out of home although paying rent. Patient stated she resides with her dad but he is staying with sister due to recent health issues. Patient stated [facility] did not give her a date when to be out of home. Patient stated landlord is selling the home. Patient stated she has dad's dog and cat so not sure where to go with them. Patient cannot stay with sister as there is no more room. Patient stated she has not where else to go. Patient stated she already is on waitlist for section 8 through [address]. [facility] offered to have [address] [place] [address] 165 assist with rental assistance and housing programs. Patient agreed as she needs all the help she can get at this time. Patient did not report any other needs at this time. [facility] provided her contact information. [facility] advised patient to call her if she has any other needs. Patient agreed. Patient agreed to continued  outreach. [facility] added patient to report under socially complex. [facility] sent in basket message to [facility]. [facility] will continue to f/u.

## 2023-08-25 ENCOUNTER — PATIENT OUTREACH (OUTPATIENT)
Dept: FAMILY MEDICINE CLINIC | Facility: CLINIC | Age: 53
End: 2023-08-25

## 2023-08-25 NOTE — PROGRESS NOTES
[facility] spoke with pt and communicated that [facility] sent pt request for [employer] application by mail. Pt said that would be great. [facility] asked pt regarding her having section 8 voucher, pt stated she applied 3-4 yrs ago to the housing off on [employer], but not sure if she still has it. [facility] provided pt with the number to [employer] to contact on 8/28 to verify if pt still have the section 8 or not. Pt stated the other issue is that she is in the process of being evicted and not sure when she will have to go to court for it yet, but she applied for disability and will be going to court for that sometime in September. [facility] did provide pt with the Office Of Eviction Prevention Hotline to callas they help with rental assistance and facing evicton [phone]. Pt then communicated that the other concern she has regarding the housing that pt have a dog and a cat she inherited from her father and she would be keeping them. [facility] informed pt that pets could be an issue regarding low income housing as pt doesn't qualify for senior apartment living as she is not age eligible. Pt stated she can give [employer] a call on Monday and ask about their policies on pets and go from there. Pt stated she can follow-up with [facility] on the outcome of her call on 8/28 in the afternoon. [facility] did remind pt she should be receiving an application for housing from [employer] in the mail between 5-7 days and pt should have received a email from the housing program as well. Pt responded she did receive an email and she would speak with [facility] on Monday.

## 2023-08-25 NOTE — PROGRESS NOTES
Marian Marley April left pt a message to return call if still interested in getting housing assistance. Marian Donell Chen then emailed pt introducing self and that Marian Chen have been assigned to pt as her Care Management . Marian Hailecesar Chen communicated the understanding pt is in need of housing and have a [facility] application being sent out in the mail to pt to complete and submit it with a copy of your state identification or license. [address] asked pt to Please let Marian Hailecesar Chen know once pt receive the application in the mail. [facility] communicated that Marian Marley [facility] is aware pt is on the list for section 8 as well, but  [employer] waitlist is closed for low income housing as well as [employer]. Marian Marley April asked for pt to Please give me a call as soon as pt can and gave Marian Donell Chen availablility is from 8-4:30pm Mom-Friday.      Will F/U by 8/29

## 2023-08-28 ENCOUNTER — PATIENT OUTREACH (OUTPATIENT)
Dept: FAMILY MEDICINE CLINIC | Facility: HOSPITAL | Age: 53
End: 2023-08-28

## 2023-08-28 NOTE — PROGRESS NOTES
[facility] [facility] 165 left pt a message to return call regrding if pt contacted [employer] to see if pt still have use of her section 8 voucher , if received her housing applivation in the mail and contacted the Office of [address] [place] to get get help with rental assistance and facing evicton [phone].     Will F/U by 9/1

## 2023-09-01 ENCOUNTER — PATIENT OUTREACH (OUTPATIENT)
Dept: FAMILY MEDICINE CLINIC | Facility: CLINIC | Age: 53
End: 2023-09-01

## 2023-09-01 NOTE — PROGRESS NOTES
[facility] left pt a message to return call regarding if pt received her mailed Gwendolyn Heber in the mail & if pt contacted the Office of Eviction prevention? Addendum:    [facility] emailed pt a follow-up email to  check in to see if pt received her [employer] application in the mail to complete yet? Also, have if pt was   able to contact the [address] [place] [address] [phone], if not please give them a call to see if they can be of any assistance. [facility] asked for pt to  and get a copy of her state identification when pt mail in you’re completed . Will F/U within a week.

## 2023-09-05 ENCOUNTER — PATIENT OUTREACH (OUTPATIENT)
Dept: FAMILY MEDICINE CLINIC | Facility: CLINIC | Age: 53
End: 2023-09-05

## 2023-09-05 NOTE — PROGRESS NOTES
JILLIAN had discussed this case with [address] [place] [address]. Laura Gonsales mentioned patient was sent [employer]. Laura Gonsales also mentioned she provided patient with contact information for office of eviction prevention program. [facility] notes Laura Gonsales will keep her updated on this case. [facility] routed note to Laura Gonsales. SCOTTIE will continue to f/u.

## 2023-09-08 ENCOUNTER — PATIENT OUTREACH (OUTPATIENT)
Dept: FAMILY MEDICINE CLINIC | Facility: HOSPITAL | Age: 53
End: 2023-09-08

## 2023-09-08 NOTE — PROGRESS NOTES
[address] [facility] 165 left pt a message to return call regarding Housing update on section 8 voucher & if contacted Office of [address] [facility] for rental assistance? [address] [facility] 165 then emailed pt and copied JILLIAN JO on sent email regarding pt housing status.     Will F/U by 9/14

## 2023-09-13 ENCOUNTER — PATIENT OUTREACH (OUTPATIENT)
Dept: FAMILY MEDICINE CLINIC | Facility: CLINIC | Age: 53
End: 2023-09-13

## 2023-09-13 NOTE — PROGRESS NOTES
SCOTTIE had called the patient via phone. SCOTTIE left a voicemail. SCOTTIE will attempt to call again at next scheduled outreach. SCOTTIE routed note to [facility]. SCOTTIE will continue to f/u.

## 2023-09-14 ENCOUNTER — PATIENT OUTREACH (OUTPATIENT)
Dept: FAMILY MEDICINE CLINIC | Facility: CLINIC | Age: 53
End: 2023-09-14

## 2023-09-14 NOTE — PROGRESS NOTES
[facility] left pt a message to return call regarding if pt was able to find out if her section 8 voucher was still good to use for [employer] & if contacted Office of Eviction Prevention.     Will F/U by 9/19

## 2023-09-19 ENCOUNTER — PATIENT OUTREACH (OUTPATIENT)
Dept: FAMILY MEDICINE CLINIC | Facility: CLINIC | Age: 53
End: 2023-09-19

## 2023-09-19 NOTE — PROGRESS NOTES
JILLIAN had discussed this case with [address] [place] [address]. Haydee Keith has not hear back from this patient nor has JILLIAN. [facility] had called patient via phone. JILLIAN left a voicemail. [facility] sent an unable to reach letter via [address] [place] [address]. [facility] will await 2 weeks before closing case. [facility] routed note to Haydee Keith. JILLIAN will continue to f/u.

## 2023-09-19 NOTE — PROGRESS NOTES
[address] [place] [address] left pt message informing this was the last message being left regarding if pt section 8 voucher is active or not to get housing assistance with [address] [facility] [address] if pt contacted the Office of [employer]. [address] [place] [address] will send unable to reach letter via pt  Mychart and routed note to [address] [place] [address].

## 2023-09-19 NOTE — LETTER
09/19/23    Dear Efraín Jackson,    I am a [facility] with [address]  [address] [place] [address] Road [address]. I have made several attempts to call you by phone. It is important that you contact me back at [phone], so that I can assist with your care needs.      Sincerely,         DANIELE RivasEd

## 2023-09-19 NOTE — LETTER
[address] [address]    Re: Sammi Head to reach   9/19/2023       Dear Vee June,    I tried to reach you by phone and was unfortunately unable to reach you. It is important you be a part of your plan of care.  If you are still in need of assistance with services please give me a call back at [phone] from 8am-4:30pm.    Sincerely,         Kelly Lofton MSW  Outpatient Care Manager -

## 2023-09-25 ENCOUNTER — HOSPITAL ENCOUNTER (EMERGENCY)
Facility: HOSPITAL | Age: 53
Discharge: HOME | End: 2023-09-25
Attending: EMERGENCY MEDICINE | Admitting: EMERGENCY MEDICINE
Payer: COMMERCIAL

## 2023-09-25 VITALS
HEART RATE: 135 BPM | DIASTOLIC BLOOD PRESSURE: 62 MMHG | SYSTOLIC BLOOD PRESSURE: 123 MMHG | RESPIRATION RATE: 21 BRPM | OXYGEN SATURATION: 99 % | TEMPERATURE: 97.2 F

## 2023-09-25 DIAGNOSIS — F10.939 ALCOHOL WITHDRAWAL (HCC): Primary | ICD-10-CM

## 2023-09-25 LAB
ALBUMIN SERPL BCP-MCNC: 4.7 G/DL (ref 3.5–5)
ALP SERPL-CCNC: 75 U/L (ref 34–104)
ALT SERPL W P-5'-P-CCNC: 39 U/L (ref 7–52)
ANION GAP SERPL CALCULATED.3IONS-SCNC: 14 MMOL/L
AST SERPL W P-5'-P-CCNC: 46 U/L (ref 13–39)
ATRIAL RATE: 114 BPM
BASOPHILS # BLD AUTO: 0.04 THOUSANDS/ÂΜL (ref 0–0.1)
BASOPHILS NFR BLD AUTO: 1 % (ref 0–1)
BILIRUB SERPL-MCNC: 0.54 MG/DL (ref 0.2–1)
BUN SERPL-MCNC: 15 MG/DL (ref 5–25)
CALCIUM SERPL-MCNC: 9.3 MG/DL (ref 8.4–10.2)
CHLORIDE SERPL-SCNC: 99 MMOL/L (ref 96–108)
CO2 SERPL-SCNC: 23 MMOL/L (ref 21–32)
CREAT SERPL-MCNC: 0.69 MG/DL (ref 0.6–1.3)
EOSINOPHIL # BLD AUTO: 0.11 THOUSAND/ÂΜL (ref 0–0.61)
EOSINOPHIL NFR BLD AUTO: 3 % (ref 0–6)
ERYTHROCYTE [DISTWIDTH] IN BLOOD BY AUTOMATED COUNT: 11.9 % (ref 11.6–15.1)
ETHANOL SERPL-MCNC: 94 MG/DL
GFR SERPL CREATININE-BSD FRML MDRD: 99 ML/MIN/1.73SQ M
GLUCOSE SERPL-MCNC: 87 MG/DL (ref 65–140)
HCT VFR BLD AUTO: 43 % (ref 34.8–46.1)
HGB BLD-MCNC: 14.6 G/DL (ref 11.5–15.4)
IMM GRANULOCYTES # BLD AUTO: 0.01 THOUSAND/UL (ref 0–0.2)
IMM GRANULOCYTES NFR BLD AUTO: 0 % (ref 0–2)
LIPASE SERPL-CCNC: 49 U/L (ref 11–82)
LYMPHOCYTES # BLD AUTO: 1.01 THOUSANDS/ÂΜL (ref 0.6–4.47)
LYMPHOCYTES NFR BLD AUTO: 23 % (ref 14–44)
MAGNESIUM SERPL-MCNC: 2.2 MG/DL (ref 1.9–2.7)
MCH RBC QN AUTO: 32.2 PG (ref 26.8–34.3)
MCHC RBC AUTO-ENTMCNC: 34 G/DL (ref 31.4–37.4)
MCV RBC AUTO: 95 FL (ref 82–98)
MONOCYTES # BLD AUTO: 0.41 THOUSAND/ÂΜL (ref 0.17–1.22)
MONOCYTES NFR BLD AUTO: 10 % (ref 4–12)
NEUTROPHILS # BLD AUTO: 2.74 THOUSANDS/ÂΜL (ref 1.85–7.62)
NEUTS SEG NFR BLD AUTO: 63 % (ref 43–75)
NRBC BLD AUTO-RTO: 0 /100 WBCS
P AXIS: 68 DEGREES
PLATELET # BLD AUTO: 272 THOUSANDS/UL (ref 149–390)
PMV BLD AUTO: 9.3 FL (ref 8.9–12.7)
POTASSIUM SERPL-SCNC: 4.4 MMOL/L (ref 3.5–5.3)
PR INTERVAL: 128 MS
PROT SERPL-MCNC: 7.6 G/DL (ref 6.4–8.4)
QRS AXIS: 44 DEGREES
QRSD INTERVAL: 76 MS
QT INTERVAL: 352 MS
QTC INTERVAL: 485 MS
RBC # BLD AUTO: 4.53 MILLION/UL (ref 3.81–5.12)
SODIUM SERPL-SCNC: 136 MMOL/L (ref 135–147)
T WAVE AXIS: 71 DEGREES
VENTRICULAR RATE: 114 BPM
WBC # BLD AUTO: 4.32 THOUSAND/UL (ref 4.31–10.16)

## 2023-09-25 PROCEDURE — 83690 ASSAY OF LIPASE: CPT | Performed by: EMERGENCY MEDICINE

## 2023-09-25 PROCEDURE — 99285 EMERGENCY DEPT VISIT HI MDM: CPT | Performed by: EMERGENCY MEDICINE

## 2023-09-25 PROCEDURE — 80053 COMPREHEN METABOLIC PANEL: CPT | Performed by: EMERGENCY MEDICINE

## 2023-09-25 PROCEDURE — 82077 ASSAY SPEC XCP UR&BREATH IA: CPT | Performed by: EMERGENCY MEDICINE

## 2023-09-25 PROCEDURE — 93010 ELECTROCARDIOGRAM REPORT: CPT | Performed by: INTERNAL MEDICINE

## 2023-09-25 PROCEDURE — 83735 ASSAY OF MAGNESIUM: CPT | Performed by: EMERGENCY MEDICINE

## 2023-09-25 PROCEDURE — 93005 ELECTROCARDIOGRAM TRACING: CPT

## 2023-09-25 PROCEDURE — 36415 COLL VENOUS BLD VENIPUNCTURE: CPT | Performed by: EMERGENCY MEDICINE

## 2023-09-25 PROCEDURE — 85025 COMPLETE CBC W/AUTO DIFF WBC: CPT | Performed by: EMERGENCY MEDICINE

## 2023-09-25 PROCEDURE — 96375 TX/PRO/DX INJ NEW DRUG ADDON: CPT

## 2023-09-25 PROCEDURE — 96374 THER/PROPH/DIAG INJ IV PUSH: CPT

## 2023-09-25 PROCEDURE — 96361 HYDRATE IV INFUSION ADD-ON: CPT

## 2023-09-25 PROCEDURE — 99285 EMERGENCY DEPT VISIT HI MDM: CPT

## 2023-09-25 RX ORDER — ONDANSETRON 2 MG/ML
4 INJECTION INTRAMUSCULAR; INTRAVENOUS ONCE
Status: COMPLETED | OUTPATIENT
Start: 2023-09-25 | End: 2023-09-25

## 2023-09-25 RX ORDER — ONDANSETRON 4 MG/1
4 TABLET, ORALLY DISINTEGRATING ORAL EVERY 6 HOURS PRN
Qty: 15 TABLET | Refills: 0 | Status: SHIPPED | OUTPATIENT
Start: 2023-09-25

## 2023-09-25 RX ORDER — CHLORDIAZEPOXIDE HYDROCHLORIDE 25 MG/1
75 CAPSULE, GELATIN COATED ORAL ONCE
Status: COMPLETED | OUTPATIENT
Start: 2023-09-25 | End: 2023-09-25

## 2023-09-25 RX ORDER — DIAZEPAM 5 MG/ML
10 INJECTION, SOLUTION INTRAMUSCULAR; INTRAVENOUS ONCE
Status: COMPLETED | OUTPATIENT
Start: 2023-09-25 | End: 2023-09-25

## 2023-09-25 RX ORDER — CHLORDIAZEPOXIDE HYDROCHLORIDE 25 MG/1
CAPSULE, GELATIN COATED ORAL
Qty: 12 CAPSULE | Refills: 0 | Status: SHIPPED | OUTPATIENT
Start: 2023-09-25 | End: 2023-09-27 | Stop reason: SDUPTHER

## 2023-09-25 RX ADMIN — DIAZEPAM 10 MG: 10 INJECTION, SOLUTION INTRAMUSCULAR; INTRAVENOUS at 09:25

## 2023-09-25 RX ADMIN — SODIUM CHLORIDE 1000 ML: 0.9 INJECTION, SOLUTION INTRAVENOUS at 09:26

## 2023-09-25 RX ADMIN — CHLORDIAZEPOXIDE HYDROCHLORIDE 75 MG: 25 CAPSULE ORAL at 11:30

## 2023-09-25 RX ADMIN — ONDANSETRON 4 MG: 2 INJECTION INTRAMUSCULAR; INTRAVENOUS at 09:30

## 2023-09-25 NOTE — ED CARE HANDOFF
[address] Warm Handoff Outcome Note    Patient name Mohsen David  Location ED 07/ED 07 MRN [id number]  Age: 47 y.o. Plan Type:   Warm Handoff                                                                                    Plan Date: 9/25/2023  Service:  ED Warm Handoff      Substance Use History:  ETOH    Warm Handoff Update:  Pt discharged with follow up appt's at hospital    Warm Handoff Outcome: Treatment Related Resources
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
## 2023-09-27 DIAGNOSIS — F10.939 ALCOHOL WITHDRAWAL (HCC): ICD-10-CM

## 2023-09-27 DIAGNOSIS — F51.01 PRIMARY INSOMNIA: ICD-10-CM

## 2023-09-27 NOTE — TELEPHONE ENCOUNTER
Patient is requesting a Refill for Ativan 1mg Patient states this was recently prescribed during her ED visit on 8/12/2023.

## 2023-09-27 NOTE — TELEPHONE ENCOUNTER
Patient calling stating that someone was trying to call her back from this office just now. I explained to her that there is nothing in the chart stating that any one was just calling her back and the office is gone for the evening and will be back in the morning.

## 2023-09-28 DIAGNOSIS — F10.220 ALCOHOL DEPENDENCE WITH UNCOMPLICATED INTOXICATION (HCC): ICD-10-CM

## 2023-09-28 DIAGNOSIS — F10.930 ALCOHOL WITHDRAWAL SYNDROME WITHOUT COMPLICATION (HCC): Primary | ICD-10-CM

## 2023-09-28 RX ORDER — CHLORDIAZEPOXIDE HYDROCHLORIDE 25 MG/1
25 CAPSULE, GELATIN COATED ORAL DAILY
Qty: 7 CAPSULE | Refills: 0 | Status: SHIPPED | OUTPATIENT
Start: 2023-09-28 | End: 2023-10-05

## 2023-09-28 RX ORDER — ZOLPIDEM TARTRATE 10 MG/1
10 TABLET ORAL
Qty: 15 TABLET | Refills: 0 | Status: SHIPPED | OUTPATIENT
Start: 2023-09-28

## 2023-09-28 NOTE — TELEPHONE ENCOUNTER
The Librium was prescribed as a tapering dose to end on 10/1/23. I can give patient another week of the daily librium 25 mg dose. She needs to be seen in our office ASAP or urged to seek help for alcohol cessation. Without evaluating patient cannot continue prescribing as has not been seen or evaluated by us in 6 months. There is risk of complications with this medication, Ambien and Ativan with alcohol use and cannot continue long term prescribing.

## 2023-09-28 NOTE — TELEPHONE ENCOUNTER
Thank you, notified patient that her Rx's have been sent to her pharmacy. Scheduled Follow up appointment w/ Dayoub 10/17/23 provided patient w/appointment details.

## 2023-10-03 ENCOUNTER — PATIENT OUTREACH (OUTPATIENT)
Dept: FAMILY MEDICINE CLINIC | Facility: CLINIC | Age: 53
End: 2023-10-03

## 2023-10-03 NOTE — PROGRESS NOTES
[facility] had discussed this case with [address] [place] [address] during huddle. SW notes patient has not called her nor Eliotonda back. SWCM closed case today due to lack of communication from patient. SW notes patient has resources to f/u on her own. [facility] had removed herself and Jas from care team. [facility] routed note to [place] making her away of same. Please reconsult for future needs.

## 2023-10-12 ENCOUNTER — TELEPHONE (OUTPATIENT)
Dept: FAMILY MEDICINE CLINIC | Facility: CLINIC | Age: 53
End: 2023-10-12

## 2023-10-12 NOTE — TELEPHONE ENCOUNTER
[employer] on clinical line:     Hi, this is Gwen Hurley at [employer] in [place]. I'm calling about May Lira patient. Patient is Edinson Alex. That's spelled ROEL. Her YOB: 1970. I'm calling about an odd situation. He was prescribed Zolpidem on September 28th and the doctor prescribed her 15 tablets. We accidentally gave her 15 extra. We dispense 30 tablets. We did confirm this with the patient and the patient stated that she was going to bring it back to us. She hasn't brought back to us and I talked to her yesterday and she says she can't find them now that she's between houses and moving. So the reason I'm calling my boss wanted to see if it would be possible for us to either edit this open them prescription to 30 tablets to reflect how many we gave her or if we could get a new script for 15 and then just cancel it out since she's already got the 30. Appreciate a call back. Phone number is [phone]. Thanks so much. Nora. Dr Briseida Desir- can you review and see if you feel comfortable doing either of these?

## 2023-10-19 DIAGNOSIS — E03.9 HYPOTHYROIDISM, UNSPECIFIED TYPE: ICD-10-CM

## 2023-10-19 RX ORDER — LEVOTHYROXINE SODIUM 0.03 MG/1
25 TABLET ORAL DAILY
Qty: 30 TABLET | Refills: 2 | Status: SHIPPED | OUTPATIENT
Start: 2023-10-19

## 2023-11-05 DIAGNOSIS — I47.10 PSVT (PAROXYSMAL SUPRAVENTRICULAR TACHYCARDIA): ICD-10-CM

## 2023-11-07 ENCOUNTER — OFFICE VISIT (OUTPATIENT)
Dept: INTERNAL MEDICINE CLINIC | Facility: CLINIC | Age: 53
End: 2023-11-07
Payer: COMMERCIAL

## 2023-11-07 VITALS
DIASTOLIC BLOOD PRESSURE: 80 MMHG | HEART RATE: 118 BPM | HEIGHT: 69 IN | BODY MASS INDEX: 20.65 KG/M2 | SYSTOLIC BLOOD PRESSURE: 156 MMHG | OXYGEN SATURATION: 99 % | TEMPERATURE: 98 F | WEIGHT: 139.4 LBS

## 2023-11-07 DIAGNOSIS — I47.10 PAROXYSMAL SUPRAVENTRICULAR TACHYCARDIA: ICD-10-CM

## 2023-11-07 DIAGNOSIS — I50.42 CHRONIC COMBINED SYSTOLIC AND DIASTOLIC CHF (CONGESTIVE HEART FAILURE) (HCC): Primary | ICD-10-CM

## 2023-11-07 DIAGNOSIS — F41.0 GENERALIZED ANXIETY DISORDER WITH PANIC ATTACKS: ICD-10-CM

## 2023-11-07 DIAGNOSIS — F41.1 GENERALIZED ANXIETY DISORDER WITH PANIC ATTACKS: ICD-10-CM

## 2023-11-07 DIAGNOSIS — E03.9 HYPOTHYROIDISM, UNSPECIFIED TYPE: ICD-10-CM

## 2023-11-07 DIAGNOSIS — Z98.890 S/P CATHETER ABLATION OF SLOW PATHWAY: ICD-10-CM

## 2023-11-07 DIAGNOSIS — Z00.00 ANNUAL PHYSICAL EXAM: ICD-10-CM

## 2023-11-07 DIAGNOSIS — Z86.79 S/P CATHETER ABLATION OF SLOW PATHWAY: ICD-10-CM

## 2023-11-07 DIAGNOSIS — I42.8 NONISCHEMIC CARDIOMYOPATHY (HCC): ICD-10-CM

## 2023-11-07 DIAGNOSIS — L50.9 URTICARIA: ICD-10-CM

## 2023-11-07 PROCEDURE — 99213 OFFICE O/P EST LOW 20 MIN: CPT | Performed by: INTERNAL MEDICINE

## 2023-11-07 PROCEDURE — 99396 PREV VISIT EST AGE 40-64: CPT | Performed by: INTERNAL MEDICINE

## 2023-11-07 RX ORDER — LORATADINE 10 MG/1
10 TABLET ORAL DAILY
Qty: 30 TABLET | Refills: 5 | Status: SHIPPED | OUTPATIENT
Start: 2023-11-07

## 2023-11-07 RX ORDER — LEVOTHYROXINE SODIUM 0.03 MG/1
25 TABLET ORAL DAILY
Qty: 30 TABLET | Refills: 10 | Status: SHIPPED | OUTPATIENT
Start: 2023-11-07

## 2023-11-07 RX ORDER — DIPHENHYDRAMINE HCL 25 MG
25 TABLET ORAL 2 TIMES DAILY PRN
Qty: 60 TABLET | Refills: 1 | Status: SHIPPED | OUTPATIENT
Start: 2023-11-07

## 2023-11-07 RX ORDER — ALPRAZOLAM 0.5 MG/1
0.5 TABLET ORAL
Qty: 30 TABLET | Refills: 1 | Status: SHIPPED | OUTPATIENT
Start: 2023-11-07

## 2023-11-07 NOTE — PATIENT INSTRUCTIONS
Wellness Visit for Adults   AMBULATORY CARE:   A wellness visit  is when you see your healthcare provider to get screened for health problems. Your healthcare provider will also give you advice on how to stay healthy. Write down your questions so you remember to ask them. Ask your healthcare provider how often you should have a wellness visit. What happens at a wellness visit:  Your healthcare provider will ask about your health, and your family history of health problems. This includes high blood pressure, heart disease, and cancer. He or she will ask if you have symptoms that concern you, if you smoke, and about your mood. You may also be asked about your intake of medicines, supplements, food, and alcohol. Any of the following may be done: Your weight  will be checked. Your height may also be checked so your body mass index (BMI) can be calculated. Your BMI shows if you are at a healthy weight. Your blood pressure  and heart rate will be checked. Your temperature may also be checked. Blood and urine tests  may be done. Blood tests may be done to check your cholesterol levels. Abnormal cholesterol levels increase your risk for heart disease and stroke. You may also need a blood or urine test to check for diabetes if you are at increased risk. Urine tests may be done to look for signs of an infection or kidney disease. A physical exam  includes checking your heartbeat and lungs with a stethoscope. Your healthcare provider may also check your skin to look for sun damage. Screening tests  may be recommended. A screening test is done to check for diseases that may not cause symptoms. The screening tests you may need depend on your age, gender, family history, and lifestyle habits. For example, colorectal screening may be recommended if you are 48years old or older. Screening tests you need if you are a woman:   A Pap smear  is used to screen for cervical cancer.  Pap smears are usually done every 3 to 5 years depending on your age. You may need them more often if you have had abnormal Pap smear test results in the past. Ask your healthcare provider how often you should have a Pap smear. A mammogram  is an x-ray of your breasts to screen for breast cancer. Experts recommend mammograms every 2 years starting at age 48 years. You may need a mammogram at age 52 years or younger if you have an increased risk for breast cancer. Talk to your healthcare provider about when you should start having mammograms and how often you need them. Vaccines you may need:   Get an influenza vaccine  every year. The influenza vaccine protects you from the flu. Several types of viruses cause the flu. The viruses change over time, so new vaccines are made each year. Get a tetanus-diphtheria (Td) booster vaccine  every 10 years. This vaccine protects you against tetanus and diphtheria. Tetanus is a severe infection that may cause painful muscle spasms and lockjaw. Diphtheria is a severe bacterial infection that causes a thick covering in the back of your mouth and throat. Get a human papillomavirus (HPV) vaccine  if you are female and aged 23 to 32 or male 23 to 24 and never received it. This vaccine protects you from HPV infection. HPV is the most common infection spread by sexual contact. HPV may also cause vaginal, penile, and anal cancers. Get a pneumococcal vaccine  if you are aged 72 years or older. The pneumococcal vaccine is an injection given to protect you from pneumococcal disease. Pneumococcal disease is an infection caused by pneumococcal bacteria. The infection may cause pneumonia, meningitis, or an ear infection. Get a shingles vaccine  if you are 60 or older, even if you have had shingles before. The shingles vaccine is an injection to protect you from the varicella-zoster virus. This is the same virus that causes chickenpox.  Shingles is a painful rash that develops in people who had chickenpox or have been exposed to the virus. How to eat healthy:  My Plate is a model for planning healthy meals. It shows the types and amounts of foods that should go on your plate. Fruits and vegetables make up about half of your plate, and grains and protein make up the other half. A serving of dairy is included on the side of your plate. The amount of calories and serving sizes you need depends on your age, gender, weight, and height. Examples of healthy foods are listed below:  Eat a variety of vegetables  such as dark green, red, and orange vegetables. You can also include canned vegetables low in sodium (salt) and frozen vegetables without added butter or sauces. Eat a variety of fresh fruits , canned fruit in 100% juice, frozen fruit, and dried fruit. Include whole grains. At least half of the grains you eat should be whole grains. Examples include whole-wheat bread, wheat pasta, brown rice, and whole-grain cereals such as oatmeal.    Eat a variety of protein foods such as seafood (fish and shellfish), lean meat, and poultry without skin (turkey and chicken). Examples of lean meats include pork leg, shoulder, or tenderloin, and beef round, sirloin, tenderloin, and extra lean ground beef. Other protein foods include eggs and egg substitutes, beans, peas, soy products, nuts, and seeds. Choose low-fat dairy products such as skim or 1% milk or low-fat yogurt, cheese, and cottage cheese. Limit unhealthy fats  such as butter, hard margarine, and shortening. Exercise:  Exercise at least 30 minutes per day on most days of the week. Some examples of exercise include walking, biking, dancing, and swimming. You can also fit in more physical activity by taking the stairs instead of the elevator or parking farther away from stores. Include muscle strengthening activities 2 days each week. Regular exercise provides many health benefits.  It helps you manage your weight, and decreases your risk for type 2 diabetes, heart disease, stroke, and high blood pressure. Exercise can also help improve your mood. Ask your healthcare provider about the best exercise plan for you. General health and safety guidelines:   Do not smoke. Nicotine and other chemicals in cigarettes and cigars can cause lung damage. Ask your healthcare provider for information if you currently smoke and need help to quit. E-cigarettes or smokeless tobacco still contain nicotine. Talk to your healthcare provider before you use these products. Limit alcohol. A drink of alcohol is 12 ounces of beer, 5 ounces of wine, or 1½ ounces of liquor. Lose weight, if needed. Being overweight increases your risk of certain health conditions. These include heart disease, high blood pressure, type 2 diabetes, and certain types of cancer. Protect your skin. Do not sunbathe or use tanning beds. Use sunscreen with a SPF 15 or higher. Apply sunscreen at least 15 minutes before you go outside. Reapply sunscreen every 2 hours. Wear protective clothing, hats, and sunglasses when you are outside. Drive safely. Always wear your seatbelt. Make sure everyone in your car wears a seatbelt. A seatbelt can save your life if you are in an accident. Do not use your cell phone when you are driving. This could distract you and cause an accident. Pull over if you need to make a call or send a text message. Practice safe sex. Use latex condoms if are sexually active and have more than one partner. Your healthcare provider may recommend screening tests for sexually transmitted infections (STIs). Wear helmets, lifejackets, and protective gear. Always wear a helmet when you ride a bike or motorcycle, go skiing, or play sports that could cause a head injury. Wear protective equipment when you play sports. Wear a lifejacket when you are on a boat or doing water sports.     © Copyright [employer] 2023 Information is for End User's use only and may not be sold, redistributed or otherwise used for commercial purposes. The above information is an  only. It is not intended as medical advice for individual conditions or treatments. Talk to your doctor, nurse or pharmacist before following any medical regimen to see if it is safe and effective for you. Wellness Visit for Adults   AMBULATORY CARE:   A wellness visit  is when you see your healthcare provider to get screened for health problems. Your healthcare provider will also give you advice on how to stay healthy. Write down your questions so you remember to ask them. Ask your healthcare provider how often you should have a wellness visit. What happens at a wellness visit:  Your healthcare provider will ask about your health, and your family history of health problems. This includes high blood pressure, heart disease, and cancer. He or she will ask if you have symptoms that concern you, if you smoke, and about your mood. You may also be asked about your intake of medicines, supplements, food, and alcohol. Any of the following may be done: Your weight  will be checked. Your height may also be checked so your body mass index (BMI) can be calculated. Your BMI shows if you are at a healthy weight. Your blood pressure  and heart rate will be checked. Your temperature may also be checked. Blood and urine tests  may be done. Blood tests may be done to check your cholesterol levels. Abnormal cholesterol levels increase your risk for heart disease and stroke. You may also need a blood or urine test to check for diabetes if you are at increased risk. Urine tests may be done to look for signs of an infection or kidney disease. A physical exam  includes checking your heartbeat and lungs with a stethoscope. Your healthcare provider may also check your skin to look for sun damage. Screening tests  may be recommended. A screening test is done to check for diseases that may not cause symptoms.  The screening tests you may need depend on your age, gender, family history, and lifestyle habits. For example, colorectal screening may be recommended if you are 48years old or older. Screening tests you need if you are a woman:   A Pap smear  is used to screen for cervical cancer. Pap smears are usually done every 3 to 5 years depending on your age. You may need them more often if you have had abnormal Pap smear test results in the past. Ask your healthcare provider how often you should have a Pap smear. A mammogram  is an x-ray of your breasts to screen for breast cancer. Experts recommend mammograms every 2 years starting at age 48 years. You may need a mammogram at age 52 years or younger if you have an increased risk for breast cancer. Talk to your healthcare provider about when you should start having mammograms and how often you need them. Vaccines you may need:   Get an influenza vaccine  every year. The influenza vaccine protects you from the flu. Several types of viruses cause the flu. The viruses change over time, so new vaccines are made each year. Get a tetanus-diphtheria (Td) booster vaccine  every 10 years. This vaccine protects you against tetanus and diphtheria. Tetanus is a severe infection that may cause painful muscle spasms and lockjaw. Diphtheria is a severe bacterial infection that causes a thick covering in the back of your mouth and throat. Get a human papillomavirus (HPV) vaccine  if you are female and aged 23 to 32 or male 23 to 24 and never received it. This vaccine protects you from HPV infection. HPV is the most common infection spread by sexual contact. HPV may also cause vaginal, penile, and anal cancers. Get a pneumococcal vaccine  if you are aged 72 years or older. The pneumococcal vaccine is an injection given to protect you from pneumococcal disease. Pneumococcal disease is an infection caused by pneumococcal bacteria. The infection may cause pneumonia, meningitis, or an ear infection.     Get a shingles vaccine  if you are 60 or older, even if you have had shingles before. The shingles vaccine is an injection to protect you from the varicella-zoster virus. This is the same virus that causes chickenpox. Shingles is a painful rash that develops in people who had chickenpox or have been exposed to the virus. How to eat healthy:  My Plate is a model for planning healthy meals. It shows the types and amounts of foods that should go on your plate. Fruits and vegetables make up about half of your plate, and grains and protein make up the other half. A serving of dairy is included on the side of your plate. The amount of calories and serving sizes you need depends on your age, gender, weight, and height. Examples of healthy foods are listed below:  Eat a variety of vegetables  such as dark green, red, and orange vegetables. You can also include canned vegetables low in sodium (salt) and frozen vegetables without added butter or sauces. Eat a variety of fresh fruits , canned fruit in 100% juice, frozen fruit, and dried fruit. Include whole grains. At least half of the grains you eat should be whole grains. Examples include whole-wheat bread, wheat pasta, brown rice, and whole-grain cereals such as oatmeal.    Eat a variety of protein foods such as seafood (fish and shellfish), lean meat, and poultry without skin (turkey and chicken). Examples of lean meats include pork leg, shoulder, or tenderloin, and beef round, sirloin, tenderloin, and extra lean ground beef. Other protein foods include eggs and egg substitutes, beans, peas, soy products, nuts, and seeds. Choose low-fat dairy products such as skim or 1% milk or low-fat yogurt, cheese, and cottage cheese. Limit unhealthy fats  such as butter, hard margarine, and shortening. Exercise:  Exercise at least 30 minutes per day on most days of the week. Some examples of exercise include walking, biking, dancing, and swimming.  You can also fit in more physical activity by taking the stairs instead of the elevator or parking farther away from stores. Include muscle strengthening activities 2 days each week. Regular exercise provides many health benefits. It helps you manage your weight, and decreases your risk for type 2 diabetes, heart disease, stroke, and high blood pressure. Exercise can also help improve your mood. Ask your healthcare provider about the best exercise plan for you. General health and safety guidelines:   Do not smoke. Nicotine and other chemicals in cigarettes and cigars can cause lung damage. Ask your healthcare provider for information if you currently smoke and need help to quit. E-cigarettes or smokeless tobacco still contain nicotine. Talk to your healthcare provider before you use these products. Limit alcohol. A drink of alcohol is 12 ounces of beer, 5 ounces of wine, or 1½ ounces of liquor. Lose weight, if needed. Being overweight increases your risk of certain health conditions. These include heart disease, high blood pressure, type 2 diabetes, and certain types of cancer. Protect your skin. Do not sunbathe or use tanning beds. Use sunscreen with a SPF 15 or higher. Apply sunscreen at least 15 minutes before you go outside. Reapply sunscreen every 2 hours. Wear protective clothing, hats, and sunglasses when you are outside. Drive safely. Always wear your seatbelt. Make sure everyone in your car wears a seatbelt. A seatbelt can save your life if you are in an accident. Do not use your cell phone when you are driving. This could distract you and cause an accident. Pull over if you need to make a call or send a text message. Practice safe sex. Use latex condoms if are sexually active and have more than one partner. Your healthcare provider may recommend screening tests for sexually transmitted infections (STIs). Wear helmets, lifejackets, and protective gear.   Always wear a helmet when you ride a bike or motorcycle, go skiing, or play sports that could cause a head injury. Wear protective equipment when you play sports. Wear a lifejacket when you are on a boat or doing water sports. © Copyright [facility] 2023 Information is for End User's use only and may not be sold, redistributed or otherwise used for commercial purposes. The above information is an  only. It is not intended as medical advice for individual conditions or treatments. Talk to your doctor, nurse or pharmacist before following any medical regimen to see if it is safe and effective for you.

## 2023-11-07 NOTE — ASSESSMENT & PLAN NOTE
Came in generalized maculopapular itching rash off and on patient has similar history about a year ago was treated we will start Claritin for prevention and Benadryl 25 mg twice daily for itching rash and recommended to be evaluated by dermatology patient reluctant in the past that drug rash was ruled out
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
## 2023-11-07 NOTE — ASSESSMENT & PLAN NOTE
Wt Readings from Last 3 Encounters:   11/07/23 63.2 kg (139 lb 6.4 oz)   08/12/23 61.2 kg (135 lb)   08/12/23 61.3 kg (135 lb 3.2 oz)   Continue low-salt diet fluid restriction Daily weight monitoring CHF compensated awaiting to be seen by cardiology

## 2023-11-07 NOTE — ASSESSMENT & PLAN NOTE
Followed by cardiology patient could not afford Entresto so patient is now on losartan and Toprol-XL patient is not volume overloaded no signs of CHF or exertional except exertional dyspnea continue fluid restriction low-salt diet Lasix CHF controlled daily weight monitoring continue current treatment stable no chest pain difficulty breathing awaiting to be seen by cardiology
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
## 2023-11-07 NOTE — ASSESSMENT & PLAN NOTE
Patient's status post catheter ablation of slow pathway.   Current tachycardia seems no recurrence continue current regimen with metoprolol awaiting to be seen by cardiology

## 2023-11-07 NOTE — ASSESSMENT & PLAN NOTE
Patient's heart rate controlled for now regular no more episode of supraventricular tachycardia controlled on metoprolol no change since the last visit a awaiting cardiology follow-up in 2 months status post catheter ablation of slow pathway

## 2023-11-07 NOTE — ASSESSMENT & PLAN NOTE
Patient was on Lexapro about a month ago discontinued due to side effects in the past was treated with Xanax once or twice a day for panic attack patient claims that in the past patient stopped Lexapro Xanax on her on at that time started drinking alcohol and ended up in the emergency room with alcohol withdrawal treated at present time patient claims has not touched alcohol for last 6 weeks patient has an appointment to be seen by psychiatrist will give Xanax 0.5 once a day

## 2023-11-16 RX ORDER — METOPROLOL SUCCINATE 100 MG/1
TABLET, EXTENDED RELEASE ORAL
Qty: 90 TABLET | Refills: 0 | Status: SHIPPED | OUTPATIENT
Start: 2023-11-16

## 2023-11-29 ENCOUNTER — TELEPHONE (OUTPATIENT)
Age: 53
End: 2023-11-29

## 2023-11-29 DIAGNOSIS — Z11.59 SCREENING FOR MEASLES: ICD-10-CM

## 2023-11-29 DIAGNOSIS — Z13.9 SCREENING DUE: ICD-10-CM

## 2023-11-29 DIAGNOSIS — Z11.59 SCREENING EXAMINATION FOR RUBELLA: Primary | ICD-10-CM

## 2023-11-29 NOTE — TELEPHONE ENCOUNTER
Called patient back, no answer.  Left message that forms will be completed on Thursday and we call her to let her know when she can  the forms

## 2023-11-29 NOTE — TELEPHONE ENCOUNTER
Patient called stating that the physical form she just dropped off for  to complete, the section for the titers info does not need to be completed. She states she had this done in [facility].

## 2023-12-29 ENCOUNTER — OFFICE VISIT (OUTPATIENT)
Dept: CARDIOLOGY CLINIC | Facility: CLINIC | Age: 53
End: 2023-12-29
Payer: COMMERCIAL

## 2023-12-29 VITALS
BODY MASS INDEX: 20.88 KG/M2 | HEART RATE: 93 BPM | DIASTOLIC BLOOD PRESSURE: 64 MMHG | OXYGEN SATURATION: 98 % | HEIGHT: 69 IN | SYSTOLIC BLOOD PRESSURE: 130 MMHG | WEIGHT: 141 LBS

## 2023-12-29 DIAGNOSIS — I50.42 CHRONIC COMBINED SYSTOLIC AND DIASTOLIC CHF (CONGESTIVE HEART FAILURE) (HCC): ICD-10-CM

## 2023-12-29 DIAGNOSIS — I47.10 PAROXYSMAL SUPRAVENTRICULAR TACHYCARDIA: ICD-10-CM

## 2023-12-29 DIAGNOSIS — I47.10 PSVT (PAROXYSMAL SUPRAVENTRICULAR TACHYCARDIA): Primary | ICD-10-CM

## 2023-12-29 DIAGNOSIS — I42.0 DILATED CARDIOMYOPATHY (HCC): ICD-10-CM

## 2023-12-29 DIAGNOSIS — I50.42 CHRONIC COMBINED SYSTOLIC AND DIASTOLIC HEART FAILURE, NYHA CLASS 2 (HCC): ICD-10-CM

## 2023-12-29 PROCEDURE — 99214 OFFICE O/P EST MOD 30 MIN: CPT | Performed by: NURSE PRACTITIONER

## 2023-12-29 PROCEDURE — 93000 ELECTROCARDIOGRAM COMPLETE: CPT | Performed by: NURSE PRACTITIONER

## 2023-12-29 RX ORDER — DIPHENHYDRAMINE HCL 25 MG
TABLET ORAL
COMMUNITY
Start: 2023-11-07
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
## 2023-12-30 DIAGNOSIS — E78.5 DYSLIPIDEMIA: ICD-10-CM

## 2024-01-02 DIAGNOSIS — F41.0 GENERALIZED ANXIETY DISORDER WITH PANIC ATTACKS: ICD-10-CM

## 2024-01-02 DIAGNOSIS — F41.1 GENERALIZED ANXIETY DISORDER WITH PANIC ATTACKS: ICD-10-CM

## 2024-01-02 RX ORDER — ROSUVASTATIN CALCIUM 5 MG/1
TABLET, COATED ORAL
Qty: 45 TABLET | Refills: 3 | Status: SHIPPED | OUTPATIENT
Start: 2024-01-02

## 2024-01-02 NOTE — TELEPHONE ENCOUNTER
Patient is requesting a call once the refill is submitted. Best phone number for her is [phone]    Thank you

## 2024-01-03 RX ORDER — ALPRAZOLAM 0.5 MG/1
0.5 TABLET ORAL
Qty: 30 TABLET | Refills: 1 | Status: SHIPPED | OUTPATIENT
Start: 2024-01-03

## 2024-01-07 NOTE — TELEPHONE ENCOUNTER
----- Message from [facility], MD sent at 2/16/2020 10:39 PM EST -----  Please let pt know that her TB test was normal      Thank you,  Maria Eugenia Petit NAV updated Da with tim SEGAL that the pt has a home nebulizer ordered for review. CM following for approval.    01/07/24 1037   Post-Acute Status   Post-Acute Authorization E   E Status Referrals Sent

## 2024-02-23 ENCOUNTER — OFFICE VISIT (OUTPATIENT)
Dept: INTERNAL MEDICINE CLINIC | Facility: CLINIC | Age: 54
End: 2024-02-23
Payer: COMMERCIAL

## 2024-02-23 VITALS
SYSTOLIC BLOOD PRESSURE: 108 MMHG | BODY MASS INDEX: 20.29 KG/M2 | WEIGHT: 137 LBS | HEART RATE: 74 BPM | HEIGHT: 69 IN | OXYGEN SATURATION: 99 % | DIASTOLIC BLOOD PRESSURE: 70 MMHG

## 2024-02-23 DIAGNOSIS — L50.9 URTICARIA: Primary | ICD-10-CM

## 2024-02-23 DIAGNOSIS — Z12.31 ENCOUNTER FOR SCREENING MAMMOGRAM FOR MALIGNANT NEOPLASM OF BREAST: ICD-10-CM

## 2024-02-23 DIAGNOSIS — F41.1 GENERALIZED ANXIETY DISORDER WITH PANIC ATTACKS: ICD-10-CM

## 2024-02-23 DIAGNOSIS — F41.0 GENERALIZED ANXIETY DISORDER WITH PANIC ATTACKS: ICD-10-CM

## 2024-02-23 PROCEDURE — 99213 OFFICE O/P EST LOW 20 MIN: CPT | Performed by: INTERNAL MEDICINE

## 2024-02-23 RX ORDER — ALPRAZOLAM 0.5 MG/1
0.5 TABLET ORAL
Qty: 30 TABLET | Refills: 1 | Status: SHIPPED | OUTPATIENT
Start: 2024-02-23
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
## 2024-02-23 NOTE — ASSESSMENT & PLAN NOTE
History of the last 2 years struggling with macular erythematous papular rash with itching trunk extremities off-and-on 1 mm to 3 mm size in the past treated with Atarax Medrol Dosepak few times with resolution and recurrence for now for prevention on Claritin still recurrent rash    Agree continue management as follows    Dermatology consult    Benadryl 12.5 mg 3 times a day as needed for itching and rash

## 2024-02-23 NOTE — ASSESSMENT & PLAN NOTE
Intermittent anxiety the symptoms seems to be controlled with Xanax 0.5 once a day as needed for anxiety attack was given 1 month supply 1 refill

## 2024-02-23 NOTE — PATIENT INSTRUCTIONS
Urticaria   WHAT YOU NEED TO KNOW:   Urticaria is also called hives. Hives can change size and shape, and appear anywhere on your skin. They can be mild or severe and last from a few minutes to a few days. Hives may be a sign of a severe allergic reaction called anaphylaxis that needs immediate treatment. Urticaria that lasts longer than 6 weeks may be a chronic condition that needs long-term treatment.       DISCHARGE INSTRUCTIONS:   Call your local emergency number (911 in the [place]) for signs or symptoms of anaphylaxis,  such as trouble breathing, swelling in your mouth or throat, or wheezing. You may also have itching, a rash, or feel like you are going to faint.  Return to the emergency department if:   Your heart is beating faster than it normally does.    You have cramping or severe pain in your abdomen.    Call your doctor if:   You have a fever.    Your skin still itches 24 hours after you take your medicine.    You still have hives after 7 days.    Your joints are painful and swollen.    You have questions or concerns about your condition or care.    Medicines:  You may need any of the following:  Epinephrine  is used to treat severe allergic reactions such as anaphylaxis.    Antihistamines  decrease mild symptoms such as itching or a rash.    Steroids  decrease redness, pain, and swelling.    Take your medicine as directed.  Contact your healthcare provider if you think your medicine is not helping or if you have side effects. Tell your provider if you are allergic to any medicine. Keep a list of the medicines, vitamins, and herbs you take. Include the amounts, and when and why you take them. Bring the list or the pill bottles to follow-up visits. Carry your medicine list with you in case of an emergency.    Steps to take for signs or symptoms of anaphylaxis:   Immediately  give 1 shot of epinephrine only into the outer thigh muscle.         Leave the shot in place  as directed. Your provider may recommend  you leave it in place for up to 10 seconds before you remove it. This helps make sure all of the epinephrine is delivered.    Call 911 and go to the emergency department,  even if the shot improved symptoms. Do not drive yourself. Bring the used epinephrine shot with you.    Safety precautions to take if you are at risk for anaphylaxis:   Keep 2 shots of epinephrine with you at all times.  You may need a second shot, because epinephrine only works for about 20 minutes and symptoms may return. Your provider can show you and family members how to give the shot. Check the expiration date every month and replace it before it expires.    Create an action plan.  Your provider can help you create a written plan that explains the allergy and an emergency plan to treat a reaction. The plan explains when to give a second epinephrine shot if symptoms return or do not improve after the first. Give copies of the action plan and emergency instructions to family members, work and school staff, and  providers. Show them how to give a shot of epinephrine.    Be careful when you exercise.  If you have had exercise-induced anaphylaxis, do not exercise right after you eat. Stop exercising right away if you start to develop any signs or symptoms of anaphylaxis. You may first feel tired, warm, or have itchy skin. Hives, swelling, and severe breathing problems may develop if you continue to exercise.    Carry medical alert identification.  Wear medical alert jewelry or carry a card that explains the allergy. Ask your provider where to get these items.         Keep a record of triggers and symptoms.  Record everything you eat, drink, or apply to your skin for 3 weeks. Include stressful events and what you were doing right before your hives started. Bring the record with you to follow-up visits with your provider.    Manage urticaria:   Cool your skin.  This may help decrease itching. Apply a cool pack to your hives. Dip a hand towel  in cool water, wring it out, and place it on your hives. You may also soak your skin in a cool oatmeal bath.    Do not rub your hives.  This can irritate your skin and cause more hives.    Wear loose clothing.  Tight clothes may irritate your skin and cause more hives.    Manage stress.  Stress may trigger hives, or make them worse. Learn new ways to relax, such as deep breathing.    Follow up with your healthcare provider as directed:  Write down your questions so you remember to ask them during your visits.  © Copyright [employer] 2023 Information is for End User's use only and may not be sold, redistributed or otherwise used for commercial purposes.  The above information is an  only. It is not intended as medical advice for individual conditions or treatments. Talk to your doctor, nurse or pharmacist before following any medical regimen to see if it is safe and effective for you.

## 2024-02-29 ENCOUNTER — APPOINTMENT (OUTPATIENT)
Dept: LAB | Facility: CLINIC | Age: 54
End: 2024-02-29

## 2024-02-29 DIAGNOSIS — I47.10 PSVT (PAROXYSMAL SUPRAVENTRICULAR TACHYCARDIA): ICD-10-CM

## 2024-02-29 DIAGNOSIS — Z02.1 ENCOUNTER FOR PRE-EMPLOYMENT EXAMINATION: ICD-10-CM

## 2024-02-29 LAB — RUBV IGG SERPL IA-ACNC: <10 IU/ML

## 2024-02-29 PROCEDURE — 86735 MUMPS ANTIBODY: CPT

## 2024-02-29 PROCEDURE — 86787 VARICELLA-ZOSTER ANTIBODY: CPT

## 2024-02-29 PROCEDURE — 86762 RUBELLA ANTIBODY: CPT

## 2024-02-29 PROCEDURE — 36415 COLL VENOUS BLD VENIPUNCTURE: CPT

## 2024-02-29 PROCEDURE — 86765 RUBEOLA ANTIBODY: CPT

## 2024-02-29 PROCEDURE — 86480 TB TEST CELL IMMUN MEASURE: CPT

## 2024-02-29 RX ORDER — METOPROLOL SUCCINATE 100 MG/1
TABLET, EXTENDED RELEASE ORAL
Qty: 90 TABLET | Refills: 0 | Status: SHIPPED | OUTPATIENT
Start: 2024-02-29

## 2024-03-01 LAB
GAMMA INTERFERON BACKGROUND BLD IA-ACNC: 0 IU/ML
M TB IFN-G BLD-IMP: NEGATIVE
M TB IFN-G CD4+ BCKGRND COR BLD-ACNC: 0.05 IU/ML
M TB IFN-G CD4+ BCKGRND COR BLD-ACNC: 0.06 IU/ML
MEV IGG SER QL IA: ABNORMAL
MITOGEN IGNF BCKGRD COR BLD-ACNC: 10 IU/ML
MUV IGG SER QL IA: ABNORMAL
VZV IGG SER QL IA: NORMAL

## 2024-04-09 NOTE — DISCHARGE INSTR - OTHER ORDERS
[facility] Drug and alcohol resources:           [address] [facility] health resources:    [address]  24-hour crisis hotline [phone]  The [address] (CIU/ES) program provides emergency mental health services to [facility] residents who are experiencing a mental health crisis, their families and caregivers. The primary goals of the CIU/ES program are to prevent suicide, homicide and other violent and self-destructive behaviors or dangerous situations from occurring as the result of an individual’s untreated mental illness or as a result of a family experiencing a mental health crisis.     [address] Department of [facility] 2000 [facility], [address] [phone] / 1362 [facility] [address] [phone]      [facility] [address] [place] [address], [address] [facility] [phone]      1224 [facility] [address] [phone]      [address], [address] [facility] [address] [address] [phone] [address], [address] [facility] [address] [address] [phone]      Residential Services [facility] [address] [place] [address], [address] [phone]      [address] [address] [address] [facility] [phone]         [facility] 593 [facility] [address] [place], [address] [phone] or [phone]      Homeless Services ([address]) [facility] 5887 2020 [facility] [address], [address] [phone]      Intensive Family Support Services [facility] 1950 [facility], [address] [place] [address] Rd [phone]      Intensive Outpatient Treatment and Support Services IOP Program at [facility] [address] [facility] [address] [phone]      [address] [facility], [address] [phone]      PRIMARY SCREENING CENTER for [facility] 9320 [facility] , [address] HOTLINE:  [phone]      Program of [address] [facility] [address] (PACT) [address] [facility], 1001 [facility], [address] [place] [address] Rd [phone] (PACT IV)      Self-Help Center [facility] 201 [facility], [address] [facility] [address] [phone]    Supported [address] [facility] of [facility] 1950 [facility], [address] [place] [address] Rd [phone] [address] [place] [place] [address] [facility], [address] [phone]      [address] [facility] [address] of [address] [facility] [address] [facility] [phone]      [address] [facility] [address] [facility], [address] [phone] [Other Location: e.g. School (Enter Location, City,State)___] : at [unfilled], at the time of the visit. [Home] : at home, [unfilled] , at the time of the visit. [Medical Office: ([facility])___] : at the medical office located in  [Verbal consent obtained from patient] : the patient, [unfilled] [FreeTextEntry1] : Dear Dr. Dustin Nunn(PCP)   Thank you so much for the referral to help care for your patient.   Chief Complaint: elevated PSA Date of first visit: 4/9/2024 Occupation:    KASSIDY MORROW is a 60-year-old White man, with PMHx of hyperlipidemia, abnormal TSH, SANTO, and CKD who presents for elevated PSA. His PSA is 5.41 ng/mL, drawn on 3/15/24. His historical PSA results are detailed below. He has not had a pelvic MRI nor a prostate biopsy. He denies any family history of  malignancies.   LUTS History Nocturia x 3, urgency, frequency    Erections not as frequent as before; is having trouble maintaining erections to complete intercourse.  PSA Hx 5.41 NG/ML 03/15/2024  0.85 NG/ML 04/06/2019 3.24 NG/ML 02/22/2017   MRI Hx N/A   Biopsy Hx N/A   04/09/2024 IPSS 13 QOL 4 IIEF 18   Prostate cancer screening: The patient and I spoke at length about prostate cancer screening, its risks and its benefits. The patient has 2 [age, elevated PSA] risk factors for prostate cancer. He understands that many men with prostate cancer will die with the disease rather than of it and we also discussed the results large multi-center American and  prostate cancer screening trials. He also understands that PSA in and of itself does not diagnose prostate cancer but only assesses risk to a certain degree. The patient understands that to definitively screen for prostate cancer, a biopsy is required, and this procedure has risks, including bleeding, infection, ED and urinary retention.    The patient denies fevers, chills, nausea and or vomiting and no unexplained weight loss.  All pertinent laboratory, films and physician notes were reviewed. Questionnaire results were discussed with patient.

## 2024-04-29 DIAGNOSIS — F41.1 GENERALIZED ANXIETY DISORDER WITH PANIC ATTACKS: ICD-10-CM

## 2024-04-29 DIAGNOSIS — F41.0 GENERALIZED ANXIETY DISORDER WITH PANIC ATTACKS: ICD-10-CM

## 2024-04-29 RX ORDER — ALPRAZOLAM 0.5 MG/1
0.5 TABLET ORAL
Qty: 30 TABLET | Refills: 1 | Status: SHIPPED | OUTPATIENT
Start: 2024-04-29

## 2024-04-29 NOTE — TELEPHONE ENCOUNTER
Refill must be reviewed and completed by the office or provider. The refill is unable to be approved or denied by the medication management team.    Cannot be delegated

## 2024-04-29 NOTE — TELEPHONE ENCOUNTER
Emani called in requesting a medication refill for Alprazolam 0.5 mg tablet to be sent to the [facility] on [facility] for a 30 day supply with 1 refill. Patient has 1 day left of  the medication and is going away this afternoon and would like script filled this morning. Please advise once script is sent. Thank you.

## 2024-05-23 DIAGNOSIS — I42.0 DILATED CARDIOMYOPATHY (HCC): ICD-10-CM

## 2024-05-23 DIAGNOSIS — I50.42 CHRONIC COMBINED SYSTOLIC AND DIASTOLIC HEART FAILURE, NYHA CLASS 2 (HCC): ICD-10-CM

## 2024-05-24 RX ORDER — LOSARTAN POTASSIUM 50 MG/1
TABLET ORAL
Qty: 90 TABLET | Refills: 1 | Status: SHIPPED | OUTPATIENT
Start: 2024-05-24

## 2024-06-02 DIAGNOSIS — I47.10 PSVT (PAROXYSMAL SUPRAVENTRICULAR TACHYCARDIA): ICD-10-CM

## 2024-06-03 RX ORDER — METOPROLOL SUCCINATE 100 MG/1
TABLET, EXTENDED RELEASE ORAL
Qty: 90 TABLET | Refills: 1 | Status: SHIPPED | OUTPATIENT
Start: 2024-06-03

## 2024-06-14 ENCOUNTER — OFFICE VISIT (OUTPATIENT)
Dept: INTERNAL MEDICINE CLINIC | Facility: CLINIC | Age: 54
End: 2024-06-14
Payer: COMMERCIAL

## 2024-06-14 VITALS
SYSTOLIC BLOOD PRESSURE: 116 MMHG | WEIGHT: 126 LBS | DIASTOLIC BLOOD PRESSURE: 78 MMHG | OXYGEN SATURATION: 98 % | HEART RATE: 77 BPM | HEIGHT: 69 IN | BODY MASS INDEX: 18.66 KG/M2

## 2024-06-14 DIAGNOSIS — I47.10 PAROXYSMAL SUPRAVENTRICULAR TACHYCARDIA: ICD-10-CM

## 2024-06-14 DIAGNOSIS — E55.9 VITAMIN D DEFICIENCY: ICD-10-CM

## 2024-06-14 DIAGNOSIS — F41.1 GENERALIZED ANXIETY DISORDER WITH PANIC ATTACKS: ICD-10-CM

## 2024-06-14 DIAGNOSIS — I42.8 NONISCHEMIC CARDIOMYOPATHY (HCC): ICD-10-CM

## 2024-06-14 DIAGNOSIS — F41.0 GENERALIZED ANXIETY DISORDER WITH PANIC ATTACKS: ICD-10-CM

## 2024-06-14 DIAGNOSIS — I50.42 CHRONIC COMBINED SYSTOLIC AND DIASTOLIC CHF (CONGESTIVE HEART FAILURE) (HCC): Primary | ICD-10-CM

## 2024-06-14 DIAGNOSIS — E78.5 HYPERLIPIDEMIA, UNSPECIFIED HYPERLIPIDEMIA TYPE: ICD-10-CM

## 2024-06-14 DIAGNOSIS — R73.02 GLUCOSE INTOLERANCE (IMPAIRED GLUCOSE TOLERANCE): ICD-10-CM

## 2024-06-14 DIAGNOSIS — Z13.0 SCREENING FOR DEFICIENCY ANEMIA: ICD-10-CM

## 2024-06-14 DIAGNOSIS — E83.52 HYPERCALCEMIA: ICD-10-CM

## 2024-06-14 DIAGNOSIS — E03.9 HYPOTHYROIDISM, UNSPECIFIED TYPE: ICD-10-CM

## 2024-06-14 PROCEDURE — 99214 OFFICE O/P EST MOD 30 MIN: CPT | Performed by: INTERNAL MEDICINE

## 2024-06-14 RX ORDER — ALPRAZOLAM 0.5 MG/1
0.5 TABLET ORAL
Qty: 30 TABLET | Refills: 2 | Status: SHIPPED | OUTPATIENT
Start: 2024-06-14

## 2024-06-14 NOTE — ASSESSMENT & PLAN NOTE
Asymptomatic    Check TSH T4 before next visit    Continue management medication follows    Levothyroxine 25 mcg daily

## 2024-06-14 NOTE — ASSESSMENT & PLAN NOTE
Followed by cardiology patient could not afford Entresto so patient is now on losartan and Toprol-XL patient is not volume overloaded no signs of CHF or exertional except exertional dyspnea continue fluid restriction low-salt diet Lasix CHF controlled daily weight monitoring continue current treatment stable no chest pain difficulty breathing awaiting to be seen by cardiology        Above reviewed agree and continue management medication as follows    Patient euvolemic CHF compensated low-salt diet fluid restriction Daily weight monitoring    Toprol- mg with losartan 50 mg daily to be switched to Entresto after seen by cardiology

## 2024-06-14 NOTE — ASSESSMENT & PLAN NOTE
Last LDL reviewed controlled    Agree and continue management medication follows    Office    Crestor 5 mg daily    Awaiting lipid profile ALT

## 2024-06-14 NOTE — ASSESSMENT & PLAN NOTE
For now sinus rhythm rate controlled no recurrence    Agree continue management medication follow    Toprol- mg daily awaiting to be seen by cardiology check CMP
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
## 2024-06-14 NOTE — ASSESSMENT & PLAN NOTE
Wt Readings from Last 3 Encounters:   06/14/24 57.2 kg (126 lb)   02/23/24 62.1 kg (137 lb)   12/29/23 64 kg (141 lb)   Continue low-salt diet fluid restriction Daily weight monitoring CHF compensated awaiting to be seen by cardiology    CHF compensated euvolemic continue weight monitoring fluid restriction Daily weight monitoring    Losartan 50 mg daily    Toprol- mg daily to be seen by cardiology and switch to Entresto    Check CMP

## 2024-06-14 NOTE — ASSESSMENT & PLAN NOTE
Intermittent anxiety the symptoms seems to be controlled with Xanax 0.5 once a day as needed for anxiety attack was given 1 month supply 2 refills    Overall regimen helping patient control symptoms able to work full-time carry out day-to-day activities

## 2024-06-14 NOTE — PATIENT INSTRUCTIONS
Heart Failure   AMBULATORY CARE:   Heart failure  is a condition that does not allow your heart to fill or pump properly. Not enough oxygen in your blood gets to your organs and tissues. Fluid may not move through your body properly. Fluid may build up and cause swelling and trouble breathing. This is known as congestive heart failure. It is important to manage your health to improve your quality of life.       Signs and symptoms  depend on the type of heart failure you have and how severe it is. You may have any of the following:  Trouble breathing with activity that worsens to trouble breathing at rest    Shortness of breath while lying flat    Severe shortness of breath and coughing at night that usually wakes you    Feeling lightheaded when you stand up    Purple color around your mouth and nails    Confusion or anxiety    Chest pain at night    Periods of no breathing, then breathing fast    Lack of energy (often worsened by physical activity), or trouble sleeping    Swelling in your ankles, legs, or abdomen    Heartbeat that is fast or not regular    Fingers and toes feel cool to the touch    Call your local emergency number (911 in the ) if:   You have any of the following signs of a heart attack:      Squeezing, pressure, or pain in your chest    You may  also have any of the following:     Discomfort or pain in your back, neck, jaw, stomach, or arm    Shortness of breath    Nausea or vomiting    Lightheadedness or a sudden cold sweat      Seek immediate care if:   Your heartbeat is fast, slow, or uneven all the time.      Call your doctor if:   You have symptoms of worsening heart failure:      Shortness of breath at rest, at night, or that is getting worse in any way    Weight gain of 3 or more pounds (1.4 kg) in a day, or more than your healthcare provider says is okay    More swelling in your legs or ankles    Abdominal pain or swelling    More coughing    Loss of appetite    Feeling tired all the  time    You feel depressed, or you have lost interest in things you used to enjoy.    You often feel worried or afraid.    You have questions or concerns about your condition or care.    Treatment:  Heart failure is often caused by damage or injury to your heart. The damage may be caused by other heart problems, diabetes, or high blood pressure. The damage may have also been caused by an infection. Your healthcare providers will help you manage any other health conditions that may be causing your heart failure. The goals of treatment are to manage, slow, or reverse heart damage. Treatment may include any of the following:  Medicines  may be needed to help regulate your heart rhythm. You may also need medicines to lower your blood pressure, and to decrease extra fluids.    Oxygen  may help you breathe easier if your oxygen level is lower than normal. A CPAP machine may be used to keep your airway open while you sleep.         Cardiac rehab  is a program run by specialists who will help you safely strengthen your heart. In the program you will learn about exercise, relaxation, stress management, and heart-healthy nutrition. Cardiac rehab may be recommended if your heart failure is not severe.    Surgery  can be done to implant a pacemaker or another device in your chest to regulate your heart rhythm. Other types of surgery can open blocked heart vessels, replace a damaged heart valve, or remove scar tissue.    Manage swelling from extra fluid:   Elevate (raise) your legs above the level of your heart.  This will help with fluid that builds up in your legs or ankles. Elevate your legs as often as possible during the day. Prop your legs on pillows or blankets to keep them elevated comfortably. Try not to stand for long periods of time during the day. Move around to keep your blood circulating.         Limit sodium (salt).  Ask how much sodium you can have each day. Your healthcare provider may give you a limit, such as  2,300 milligrams (mg) a day. Your provider or a dietitian can teach you how to read food labels for the number of mg in a food. He or she can also help you find ways to have less salt. For example, if you add salt to food as you cook, do not add more at the table.         Drink liquids as directed.  You may need to limit the amount of liquid you drink within 24 hours. Your healthcare provider will tell you how much liquid to have and which liquids are best for you. He or she may tell you to limit liquid to 1.5 to 2 liters in a day. He or she will also tell you how often to drink liquid throughout the day.    Weigh yourself every morning.  Use the same scale, in the same spot. Do this after you use the bathroom, but before you eat or drink. Wear the same type of clothing each time. Write down your weight and call your healthcare provider if you have a sudden weight gain. Swelling and weight gain are signs of fluid buildup.       Manage heart failure:  Your quality of life may improve with treatment and the following:  Do not smoke.  Nicotine and other chemicals in cigarettes and cigars can cause lung and heart damage. Ask your healthcare provider for information if you currently smoke and need help to quit. E-cigarettes or smokeless tobacco still contain nicotine. Talk to your healthcare provider before you use these products.    Do not drink alcohol or use illegal drugs.  Alcohol and drugs can increase your risk for high blood pressure, diabetes, and coronary artery disease.    Eat heart-healthy foods.  Heart-healthy foods include fruits, vegetables, lean meat (such as beef, chicken, or pork), and low-fat dairy products. Fatty fish such as salmon and tuna are also heart healthy. Other heart-healthy foods include walnuts, whole-grain breads, and legumes such as shah beans. Replace butter and margarine with heart-healthy oils such as olive oil or canola oil. Your provider or a dietitian can help you create  heart-healthy meal plans.         Manage any chronic health conditions you have.  These include high blood pressure, diabetes, obesity, high cholesterol, metabolic syndrome, and COPD. You will have fewer symptoms if you manage these health conditions. Follow your healthcare provider's recommendations and follow up with him or her regularly.    Maintain a healthy weight.  Being overweight can increase your risk for high blood pressure, diabetes, and coronary artery disease. These conditions can make your symptoms worse. Ask your healthcare provider what a healthy weight is for you. Ask him or her to help you create a weight loss plan, if needed.    Stay active.  Activity can help keep your symptoms from getting worse. Walking is a type of physical activity that helps maintain your strength and improve your mood. Physical activity also helps you manage your weight. Work with your healthcare provider to create an exercise plan that is right for you.         Get vaccines as directed.  The flu, COVID-19, and pneumonia can be severe for a person who has heart failure. Vaccines protect you from these infections. Get a flu vaccine every year as soon as it is recommended, usually in September or October. Get a COVID-19 vaccine and booster as directed. You may also need the pneumonia vaccine. Your healthcare provider can tell you if you need other vaccines, and when to get them.       Follow up with your doctor or cardiologist within 7 days or as directed:  You may need to return for other tests. You may need home health care. A healthcare provider will monitor your vital signs, weight, and make sure your medicines are working. Write down your questions so you remember to ask them during your visits.  For support or more information:  Heart failure can be difficult to manage. It may be helpful to talk with others who have heart failure. You may learn how to better manage your condition or get emotional support. For more  information:  [employer] Association  [address] [place] [address]   Phone: 9- 144 - 619-9596  Web Address: http://www.heart.org     © Copyright Merative 2023 Information is for End User's use only and may not be sold, redistributed or otherwise used for commercial purposes.  The above information is an  only. It is not intended as medical advice for individual conditions or treatments. Talk to your doctor, nurse or pharmacist before following any medical regimen to see if it is safe and effective for you.

## 2024-08-16 ENCOUNTER — TELEPHONE (OUTPATIENT)
Age: 54
End: 2024-08-16

## 2024-08-16 NOTE — TELEPHONE ENCOUNTER
Patient was out of work Aug.10, 11 & 12 and s requesting a return to work note. Was nauseous, had diarrhea and was extremely tired. I explained the doctor typically provides a note if she was seen for an apt. but she has already returned to work.  If approved, she would like to  today about 3pm.

## 2024-08-19 ENCOUNTER — APPOINTMENT (OUTPATIENT)
Dept: LAB | Facility: HOSPITAL | Age: 54
End: 2024-08-19
Attending: INTERNAL MEDICINE
Payer: COMMERCIAL

## 2024-08-19 ENCOUNTER — APPOINTMENT (OUTPATIENT)
Dept: LAB | Facility: HOSPITAL | Age: 54
End: 2024-08-19
Payer: COMMERCIAL

## 2024-08-19 DIAGNOSIS — I42.8 NONISCHEMIC CARDIOMYOPATHY (HCC): ICD-10-CM

## 2024-08-19 DIAGNOSIS — E03.9 HYPOTHYROIDISM, UNSPECIFIED TYPE: Primary | ICD-10-CM

## 2024-08-19 DIAGNOSIS — R73.02 GLUCOSE INTOLERANCE (IMPAIRED GLUCOSE TOLERANCE): ICD-10-CM

## 2024-08-19 DIAGNOSIS — I47.10 PAROXYSMAL SUPRAVENTRICULAR TACHYCARDIA: ICD-10-CM

## 2024-08-19 DIAGNOSIS — E78.5 HYPERLIPIDEMIA, UNSPECIFIED HYPERLIPIDEMIA TYPE: ICD-10-CM

## 2024-08-19 DIAGNOSIS — Z00.8 ENCOUNTER FOR OTHER GENERAL EXAMINATION: ICD-10-CM

## 2024-08-19 DIAGNOSIS — I50.42 CHRONIC COMBINED SYSTOLIC AND DIASTOLIC CHF (CONGESTIVE HEART FAILURE) (HCC): ICD-10-CM

## 2024-08-19 DIAGNOSIS — Z13.0 SCREENING FOR DEFICIENCY ANEMIA: ICD-10-CM

## 2024-08-19 DIAGNOSIS — E03.9 HYPOTHYROIDISM, UNSPECIFIED TYPE: ICD-10-CM

## 2024-08-19 DIAGNOSIS — E83.52 HYPERCALCEMIA: ICD-10-CM

## 2024-08-19 DIAGNOSIS — E55.9 VITAMIN D DEFICIENCY: ICD-10-CM

## 2024-08-19 LAB
25(OH)D3 SERPL-MCNC: 29.9 NG/ML (ref 30–100)
ALBUMIN SERPL BCG-MCNC: 4.4 G/DL (ref 3.5–5)
ALP SERPL-CCNC: 43 U/L (ref 34–104)
ALT SERPL W P-5'-P-CCNC: 12 U/L (ref 7–52)
ANION GAP SERPL CALCULATED.3IONS-SCNC: 8 MMOL/L (ref 4–13)
AST SERPL W P-5'-P-CCNC: 21 U/L (ref 13–39)
BACTERIA UR QL AUTO: ABNORMAL /HPF
BASOPHILS # BLD AUTO: 0.03 THOUSANDS/ÂΜL (ref 0–0.1)
BASOPHILS NFR BLD AUTO: 1 % (ref 0–1)
BILIRUB SERPL-MCNC: 0.49 MG/DL (ref 0.2–1)
BILIRUB UR QL STRIP: NEGATIVE
BUN SERPL-MCNC: 17 MG/DL (ref 5–25)
CALCIUM SERPL-MCNC: 9.2 MG/DL (ref 8.4–10.2)
CHLORIDE SERPL-SCNC: 102 MMOL/L (ref 96–108)
CHOLEST SERPL-MCNC: 169 MG/DL
CLARITY UR: CLEAR
CO2 SERPL-SCNC: 26 MMOL/L (ref 21–32)
COLOR UR: YELLOW
CREAT SERPL-MCNC: 0.77 MG/DL (ref 0.6–1.3)
CREAT UR-MCNC: 140.4 MG/DL
EOSINOPHIL # BLD AUTO: 0.08 THOUSAND/ÂΜL (ref 0–0.61)
EOSINOPHIL NFR BLD AUTO: 2 % (ref 0–6)
ERYTHROCYTE [DISTWIDTH] IN BLOOD BY AUTOMATED COUNT: 12.8 % (ref 11.6–15.1)
EST. AVERAGE GLUCOSE BLD GHB EST-MCNC: 117 MG/DL
GFR SERPL CREATININE-BSD FRML MDRD: 87 ML/MIN/1.73SQ M
GLUCOSE SERPL-MCNC: 89 MG/DL (ref 65–140)
GLUCOSE UR STRIP-MCNC: NEGATIVE MG/DL
HBA1C MFR BLD: 5.7 %
HCT VFR BLD AUTO: 36.8 % (ref 34.8–46.1)
HDLC SERPL-MCNC: 86 MG/DL
HGB BLD-MCNC: 12.4 G/DL (ref 11.5–15.4)
HGB UR QL STRIP.AUTO: ABNORMAL
IMM GRANULOCYTES # BLD AUTO: 0.02 THOUSAND/UL (ref 0–0.2)
IMM GRANULOCYTES NFR BLD AUTO: 0 % (ref 0–2)
KETONES UR STRIP-MCNC: NEGATIVE MG/DL
LDLC SERPL CALC-MCNC: 44 MG/DL (ref 0–100)
LEUKOCYTE ESTERASE UR QL STRIP: ABNORMAL
LYMPHOCYTES # BLD AUTO: 0.83 THOUSANDS/ÂΜL (ref 0.6–4.47)
LYMPHOCYTES NFR BLD AUTO: 18 % (ref 14–44)
MCH RBC QN AUTO: 31.9 PG (ref 26.8–34.3)
MCHC RBC AUTO-ENTMCNC: 33.7 G/DL (ref 31.4–37.4)
MCV RBC AUTO: 95 FL (ref 82–98)
MICROALBUMIN UR-MCNC: <7 MG/L
MONOCYTES # BLD AUTO: 0.51 THOUSAND/ÂΜL (ref 0.17–1.22)
MONOCYTES NFR BLD AUTO: 11 % (ref 4–12)
MUCOUS THREADS UR QL AUTO: ABNORMAL
NEUTROPHILS # BLD AUTO: 3.09 THOUSANDS/ÂΜL (ref 1.85–7.62)
NEUTS SEG NFR BLD AUTO: 68 % (ref 43–75)
NITRITE UR QL STRIP: NEGATIVE
NON-SQ EPI CELLS URNS QL MICRO: ABNORMAL /HPF
NRBC BLD AUTO-RTO: 0 /100 WBCS
PH UR STRIP.AUTO: 5 [PH]
PLATELET # BLD AUTO: 216 THOUSANDS/UL (ref 149–390)
PMV BLD AUTO: 10 FL (ref 8.9–12.7)
POTASSIUM SERPL-SCNC: 3.7 MMOL/L (ref 3.5–5.3)
PROT SERPL-MCNC: 6.9 G/DL (ref 6.4–8.4)
PROT UR STRIP-MCNC: NEGATIVE MG/DL
RBC # BLD AUTO: 3.89 MILLION/UL (ref 3.81–5.12)
RBC #/AREA URNS AUTO: ABNORMAL /HPF
SODIUM SERPL-SCNC: 136 MMOL/L (ref 135–147)
SP GR UR STRIP.AUTO: 1.02 (ref 1–1.03)
T4 FREE SERPL-MCNC: 0.68 NG/DL (ref 0.61–1.12)
TRIGL SERPL-MCNC: 193 MG/DL
TSH SERPL DL<=0.05 MIU/L-ACNC: 2.67 UIU/ML (ref 0.45–4.5)
UROBILINOGEN UR STRIP-ACNC: <2 MG/DL
WBC # BLD AUTO: 4.56 THOUSAND/UL (ref 4.31–10.16)
WBC #/AREA URNS AUTO: ABNORMAL /HPF

## 2024-08-19 PROCEDURE — 80053 COMPREHEN METABOLIC PANEL: CPT

## 2024-08-19 PROCEDURE — 84439 ASSAY OF FREE THYROXINE: CPT

## 2024-08-19 PROCEDURE — 82043 UR ALBUMIN QUANTITATIVE: CPT

## 2024-08-19 PROCEDURE — 82306 VITAMIN D 25 HYDROXY: CPT

## 2024-08-19 PROCEDURE — 36415 COLL VENOUS BLD VENIPUNCTURE: CPT

## 2024-08-19 PROCEDURE — 82570 ASSAY OF URINE CREATININE: CPT

## 2024-08-19 PROCEDURE — 83036 HEMOGLOBIN GLYCOSYLATED A1C: CPT

## 2024-08-19 PROCEDURE — 80061 LIPID PANEL: CPT

## 2024-08-19 PROCEDURE — 85025 COMPLETE CBC W/AUTO DIFF WBC: CPT

## 2024-08-19 PROCEDURE — 81001 URINALYSIS AUTO W/SCOPE: CPT

## 2024-08-19 PROCEDURE — 84443 ASSAY THYROID STIM HORMONE: CPT

## 2024-09-18 DIAGNOSIS — F41.0 GENERALIZED ANXIETY DISORDER WITH PANIC ATTACKS: ICD-10-CM

## 2024-09-18 DIAGNOSIS — F41.1 GENERALIZED ANXIETY DISORDER WITH PANIC ATTACKS: ICD-10-CM

## 2024-09-18 RX ORDER — ALPRAZOLAM 0.5 MG
0.5 TABLET ORAL
Qty: 30 TABLET | Refills: 0 | Status: SHIPPED | OUTPATIENT
Start: 2024-09-18

## 2024-10-16 DIAGNOSIS — F41.0 GENERALIZED ANXIETY DISORDER WITH PANIC ATTACKS: ICD-10-CM

## 2024-10-16 DIAGNOSIS — F41.1 GENERALIZED ANXIETY DISORDER WITH PANIC ATTACKS: ICD-10-CM

## 2024-10-16 RX ORDER — ALPRAZOLAM 0.5 MG
0.5 TABLET ORAL
Qty: 30 TABLET | Refills: 0 | OUTPATIENT
Start: 2024-10-16

## 2024-10-16 NOTE — TELEPHONE ENCOUNTER
Call was disconnected while confirming.     Reason for call:   [x] Refill   [] Prior Auth  [] Other:     Office:   [x] PCP/Provider - Ugo Padron MD   [] Specialty/Provider -     Medication: ALPRAZolam (XANAX) 0.5 mg tablet     Dose/Frequency:     0.5 mg, Oral, Daily at bedtime PRN       Quantity: 30    Pharmacy:  [id number] [address] ([address] [address]     Does the patient have enough for 3 days? UNKNOWN  [] Yes   [] No - Send as HP to POD

## 2024-10-18 ENCOUNTER — OFFICE VISIT (OUTPATIENT)
Dept: INTERNAL MEDICINE CLINIC | Facility: CLINIC | Age: 54
End: 2024-10-18
Payer: COMMERCIAL

## 2024-10-18 VITALS
HEART RATE: 93 BPM | SYSTOLIC BLOOD PRESSURE: 130 MMHG | BODY MASS INDEX: 18.51 KG/M2 | OXYGEN SATURATION: 100 % | DIASTOLIC BLOOD PRESSURE: 74 MMHG | HEIGHT: 69 IN | WEIGHT: 125 LBS

## 2024-10-18 DIAGNOSIS — F41.0 GENERALIZED ANXIETY DISORDER WITH PANIC ATTACKS: ICD-10-CM

## 2024-10-18 DIAGNOSIS — F32.2 CURRENT SEVERE EPISODE OF MAJOR DEPRESSIVE DISORDER WITHOUT PSYCHOTIC FEATURES, UNSPECIFIED WHETHER RECURRENT (HCC): Primary | ICD-10-CM

## 2024-10-18 DIAGNOSIS — F41.1 GENERALIZED ANXIETY DISORDER WITH PANIC ATTACKS: ICD-10-CM

## 2024-10-18 DIAGNOSIS — H93.19 TINNITUS, UNSPECIFIED LATERALITY: ICD-10-CM

## 2024-10-18 PROCEDURE — 99214 OFFICE O/P EST MOD 30 MIN: CPT

## 2024-10-18 RX ORDER — ALPRAZOLAM 0.5 MG
0.5 TABLET ORAL 2 TIMES DAILY PRN
Qty: 60 TABLET | Refills: 0 | Status: SHIPPED | OUTPATIENT
Start: 2024-10-18

## 2024-10-18 RX ORDER — SERTRALINE HYDROCHLORIDE 25 MG/1
25 TABLET, FILM COATED ORAL DAILY
Qty: 30 TABLET | Refills: 2 | Status: SHIPPED | OUTPATIENT
Start: 2024-10-18

## 2024-10-18 NOTE — ASSESSMENT & PLAN NOTE
- GAD7 score 16  -Currently takes Xanax once a day at bedtime, but now requesting increase---  she was taking it twice daily in the past  - Previously taking Lexapro-- was not helpful  - Patient would likely benefit from seeing a behavioral health specialist---  pt agreeable-- referral placed  - Recommend starting alternate SSRI (Zoloft)-- pt agreeable  - Increase Xanax to BID--- 30 day supply sent  - PDMP reviewed-- last refill 1 month ago-- no red flags  Orders:    ALPRAZolam (XANAX) 0.5 mg tablet; Take 1 tablet (0.5 mg total) by mouth 2 (two) times a day as needed for anxiety    Ambulatory referral to Psych Services; Future
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
## 2024-10-23 ENCOUNTER — TELEPHONE (OUTPATIENT)
Age: 54
End: 2024-10-23

## 2024-11-07 DIAGNOSIS — I42.0 DILATED CARDIOMYOPATHY (HCC): ICD-10-CM

## 2024-11-07 DIAGNOSIS — I50.42 CHRONIC COMBINED SYSTOLIC AND DIASTOLIC HEART FAILURE, NYHA CLASS 2 (HCC): ICD-10-CM

## 2024-11-07 DIAGNOSIS — I47.10 PSVT (PAROXYSMAL SUPRAVENTRICULAR TACHYCARDIA) (HCC): ICD-10-CM

## 2024-11-07 NOTE — TELEPHONE ENCOUNTER
Reason for call:   [x] Refill   [] Prior Auth  [] Other:     Office:   [] PCP/Provider -   [x] Specialty/Provider - Cardio    Medication:  metoprolol succinate (TOPROL-XL) 100 mg 24 hr tablet    Dose/Frequency:  take 1 tablet by mouth once daily,     Quantity: 90    [id number] [address] [facility]      Does the patient have enough for 3 days?   [] Yes   [x] No - Send as HP to POD    Reason for call:   [x] Refill   [] Prior Auth  [] Other:     Office:   [] PCP/Provider -   [x] Specialty/Provider - Cardio    Medication: losartan (COZAAR) 50 mg tablet     Dose/Frequency: : take 1 tablet by mouth every morning,     Quantity: 90    Pharmacy: [facility] #[id number] [address] ( [address]      Does the patient have enough for 3 days?   [] Yes   [x] No - Send as HP to POD

## 2024-11-08 RX ORDER — LOSARTAN POTASSIUM 50 MG/1
50 TABLET ORAL EVERY MORNING
Qty: 90 TABLET | Refills: 1 | Status: SHIPPED | OUTPATIENT
Start: 2024-11-08

## 2024-11-08 RX ORDER — METOPROLOL SUCCINATE 100 MG/1
100 TABLET, EXTENDED RELEASE ORAL DAILY
Qty: 90 TABLET | Refills: 1 | Status: SHIPPED | OUTPATIENT
Start: 2024-11-08

## 2024-11-15 ENCOUNTER — OFFICE VISIT (OUTPATIENT)
Dept: INTERNAL MEDICINE CLINIC | Facility: CLINIC | Age: 54
End: 2024-11-15
Payer: COMMERCIAL

## 2024-11-15 VITALS
OXYGEN SATURATION: 100 % | HEART RATE: 90 BPM | BODY MASS INDEX: 18.72 KG/M2 | WEIGHT: 126.4 LBS | DIASTOLIC BLOOD PRESSURE: 74 MMHG | HEIGHT: 69 IN | SYSTOLIC BLOOD PRESSURE: 126 MMHG

## 2024-11-15 DIAGNOSIS — R21 RASH AND NONSPECIFIC SKIN ERUPTION: Primary | ICD-10-CM

## 2024-11-15 DIAGNOSIS — F32.2 CURRENT SEVERE EPISODE OF MAJOR DEPRESSIVE DISORDER WITHOUT PSYCHOTIC FEATURES, UNSPECIFIED WHETHER RECURRENT (HCC): ICD-10-CM

## 2024-11-15 DIAGNOSIS — F41.1 GENERALIZED ANXIETY DISORDER WITH PANIC ATTACKS: ICD-10-CM

## 2024-11-15 DIAGNOSIS — F41.0 GENERALIZED ANXIETY DISORDER WITH PANIC ATTACKS: ICD-10-CM

## 2024-11-15 PROCEDURE — 99214 OFFICE O/P EST MOD 30 MIN: CPT

## 2024-11-15 RX ORDER — TRIAMCINOLONE ACETONIDE 5 MG/G
CREAM TOPICAL 3 TIMES DAILY
Qty: 15 G | Refills: 1 | Status: SHIPPED | OUTPATIENT
Start: 2024-11-15

## 2024-11-15 RX ORDER — ALPRAZOLAM 0.5 MG
0.5 TABLET ORAL 2 TIMES DAILY PRN
Qty: 60 TABLET | Refills: 1 | Status: SHIPPED | OUTPATIENT
Start: 2024-11-15

## 2024-11-15 NOTE — ASSESSMENT & PLAN NOTE
Stable  Refill sent for 1 month with 1 refill  Orders:    ALPRAZolam (XANAX) 0.5 mg tablet; Take 1 tablet (0.5 mg total) by mouth 2 (two) times a day as needed for anxiety
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
## 2024-12-01 DIAGNOSIS — E03.9 HYPOTHYROIDISM, UNSPECIFIED TYPE: ICD-10-CM

## 2024-12-03 RX ORDER — LEVOTHYROXINE SODIUM 25 UG/1
25 TABLET ORAL DAILY
Qty: 30 TABLET | Refills: 5 | Status: SHIPPED | OUTPATIENT
Start: 2024-12-03

## 2025-01-02 DIAGNOSIS — E78.5 DYSLIPIDEMIA: ICD-10-CM

## 2025-01-03 RX ORDER — ROSUVASTATIN CALCIUM 5 MG/1
5 TABLET, COATED ORAL EVERY OTHER DAY
Qty: 45 TABLET | Refills: 3 | Status: SHIPPED | OUTPATIENT
Start: 2025-01-03

## 2025-01-09 DIAGNOSIS — I42.0 DILATED CARDIOMYOPATHY (HCC): ICD-10-CM

## 2025-01-09 DIAGNOSIS — I50.42 CHRONIC COMBINED SYSTOLIC AND DIASTOLIC HEART FAILURE, NYHA CLASS 2 (HCC): ICD-10-CM

## 2025-01-15 DIAGNOSIS — F41.0 GENERALIZED ANXIETY DISORDER WITH PANIC ATTACKS: ICD-10-CM

## 2025-01-15 DIAGNOSIS — F41.1 GENERALIZED ANXIETY DISORDER WITH PANIC ATTACKS: ICD-10-CM

## 2025-01-15 RX ORDER — ALPRAZOLAM 0.5 MG
0.5 TABLET ORAL 2 TIMES DAILY PRN
Qty: 60 TABLET | Refills: 0 | Status: SHIPPED | OUTPATIENT
Start: 2025-01-15

## 2025-01-15 NOTE — TELEPHONE ENCOUNTER
Patient is in need of   Requested Prescriptions     Pending Prescriptions Disp Refills    ALPRAZolam (XANAX) 0.5 mg tablet 60 tablet 1     Sig: Take 1 tablet (0.5 mg total) by mouth 2 (two) times a day as needed for anxiety     To the [facility] [id number]

## 2025-01-15 NOTE — TELEPHONE ENCOUNTER
PDMP reviewed  Last refill 12/15/24  No red flags  Will send for 30 day supply, no refills  Patient is due to a follow up visit to recheck mood and continued surveillance while taking controlled substance.

## 2025-01-16 RX ORDER — LOSARTAN POTASSIUM 50 MG/1
50 TABLET ORAL EVERY MORNING
Qty: 90 TABLET | Refills: 1 | Status: SHIPPED | OUTPATIENT
Start: 2025-01-16

## 2025-02-12 ENCOUNTER — TELEPHONE (OUTPATIENT)
Age: 55
End: 2025-02-12

## 2025-02-12 DIAGNOSIS — F41.1 GENERALIZED ANXIETY DISORDER WITH PANIC ATTACKS: ICD-10-CM

## 2025-02-12 DIAGNOSIS — F41.0 GENERALIZED ANXIETY DISORDER WITH PANIC ATTACKS: ICD-10-CM

## 2025-02-12 RX ORDER — ALPRAZOLAM 0.5 MG
0.5 TABLET ORAL 2 TIMES DAILY PRN
Qty: 60 TABLET | Refills: 0 | Status: SHIPPED | OUTPATIENT
Start: 2025-02-12

## 2025-02-12 NOTE — TELEPHONE ENCOUNTER
Patient called in and requested a refill for  ALPRAZolam (XANAX) 0.5 mg tablet  Please advise when scripts had been placed

## 2025-03-05 ENCOUNTER — OFFICE VISIT (OUTPATIENT)
Dept: INTERNAL MEDICINE CLINIC | Facility: CLINIC | Age: 55
End: 2025-03-05
Payer: COMMERCIAL

## 2025-03-05 VITALS
WEIGHT: 125 LBS | SYSTOLIC BLOOD PRESSURE: 112 MMHG | OXYGEN SATURATION: 97 % | DIASTOLIC BLOOD PRESSURE: 58 MMHG | BODY MASS INDEX: 18.51 KG/M2 | RESPIRATION RATE: 14 BRPM | HEART RATE: 89 BPM | TEMPERATURE: 97.7 F | HEIGHT: 69 IN

## 2025-03-05 DIAGNOSIS — I50.42 CHRONIC COMBINED SYSTOLIC AND DIASTOLIC CHF (CONGESTIVE HEART FAILURE) (HCC): ICD-10-CM

## 2025-03-05 DIAGNOSIS — F41.1 GENERALIZED ANXIETY DISORDER WITH PANIC ATTACKS: Primary | ICD-10-CM

## 2025-03-05 DIAGNOSIS — F41.0 GENERALIZED ANXIETY DISORDER WITH PANIC ATTACKS: Primary | ICD-10-CM

## 2025-03-05 DIAGNOSIS — Z00.00 ANNUAL PHYSICAL EXAM: ICD-10-CM

## 2025-03-05 PROCEDURE — 99213 OFFICE O/P EST LOW 20 MIN: CPT | Performed by: INTERNAL MEDICINE

## 2025-03-05 PROCEDURE — 99396 PREV VISIT EST AGE 40-64: CPT | Performed by: INTERNAL MEDICINE

## 2025-03-05 RX ORDER — ALPRAZOLAM 0.5 MG
0.5 TABLET ORAL 2 TIMES DAILY PRN
Qty: 60 TABLET | Refills: 2 | Status: SHIPPED | OUTPATIENT
Start: 2025-03-05

## 2025-03-05 NOTE — ASSESSMENT & PLAN NOTE
Wt Readings from Last 3 Encounters:   03/05/25 56.7 kg (125 lb)   11/15/24 57.3 kg (126 lb 6.4 oz)   10/18/24 56.7 kg (125 lb)

## 2025-03-05 NOTE — ASSESSMENT & PLAN NOTE
Patient referred to general anxiety with panic attacks seems to be controlled with Xanax 0.5 twice a day had used Effexor not controlled    Continue Zoloft 50 mg daily    Xanax 0.5 twice a day as needed for anxiety attack controlled anxiety which seems to be controlled so much able to function carry out daily day-to-day activities and work full-time  Orders:  •  ALPRAZolam (XANAX) 0.5 mg tablet; Take 1 tablet (0.5 mg total) by mouth 2 (two) times a day as needed for anxiety

## 2025-03-05 NOTE — PATIENT INSTRUCTIONS
"Patient Education     Routine physical for adults   The Basics   Written by the doctors and editors at [facility]   What is a physical? -- A physical is a routine visit, or \"check-up,\" with your doctor. You might also hear it called a \"wellness visit\" or \"preventive visit.\"  During each visit, the doctor will:   Ask about your physical and mental health   Ask about your habits, behaviors, and lifestyle   Do an exam   Give you vaccines if needed   Talk to you about any medicines you take   Give advice about your health   Answer your questions  Getting regular check-ups is an important part of taking care of your health. It can help your doctor find and treat any problems you have. But it's also important for preventing health problems.  A routine physical is different from a \"sick visit.\" A sick visit is when you see a doctor because of a health concern or problem. Since physicals are scheduled ahead of time, you can think about what you want to ask the doctor.  How often should I get a physical? -- It depends on your age and health. In general, for people age 21 years and older:   If you are younger than 50 years, you might be able to get a physical every 3 years.   If you are 50 years or older, your doctor might recommend a physical every year.  If you have an ongoing health condition, like diabetes or high blood pressure, your doctor will probably want to see you more often.  What happens during a physical? -- In general, each visit will include:   Physical exam - The doctor or nurse will check your height, weight, heart rate, and blood pressure. They will also look at your eyes and ears. They will ask about how you are feeling and whether you have any symptoms that bother you.   Medicines - It's a good idea to bring a list of all the medicines you take to each doctor visit. Your doctor will talk to you about your medicines and answer any questions. Tell them if you are having any side effects that bother you. You " "should also tell them if you are having trouble paying for any of your medicines.   Habits and behaviors - This includes:   Your diet   Your exercise habits   Whether you smoke, drink alcohol, or use drugs   Whether you are sexually active   Whether you feel safe at home  Your doctor will talk to you about things you can do to improve your health and lower your risk of health problems. They will also offer help and support. For example, if you want to quit smoking, they can give you advice and might prescribe medicines. If you want to improve your diet or get more physical activity, they can help you with this, too.   Lab tests, if needed - The tests you get will depend on your age and situation. For example, your doctor might want to check your:   Cholesterol   Blood sugar   Iron level   Vaccines - The recommended vaccines will depend on your age, health, and what vaccines you already had. Vaccines are very important because they can prevent certain serious or deadly infections.   Discussion of screening - \"Screening\" means checking for diseases or other health problems before they cause symptoms. Your doctor can recommend screening based on your age, risk, and preferences. This might include tests to check for:   Cancer, such as breast, prostate, cervical, ovarian, colorectal, prostate, lung, or skin cancer   Sexually transmitted infections, such as chlamydia and gonorrhea   Mental health conditions like depression and anxiety  Your doctor will talk to you about the different types of screening tests. They can help you decide which screenings to have. They can also explain what the results might mean.   Answering questions - The physical is a good time to ask the doctor or nurse questions about your health. If needed, they can refer you to other doctors or specialists, too.  Adults older than 65 years often need other care, too. As you get older, your doctor will talk to you about:   How to prevent falling at " home   Hearing or vision tests   Memory testing   How to take your medicines safely   Making sure that you have the help and support you need at home  All topics are updated as new evidence becomes available and our peer review process is complete.  This topic retrieved from [employer] on: May 02, 2024.  Topic 135768 Version 1.0  Release: 32.4.3 - C32.122  © 2024 UpToDate, Inc. and/or its affiliates. All rights reserved.  Consumer Information Use and Disclaimer   Disclaimer: This generalized information is a limited summary of diagnosis, treatment, and/or medication information. It is not meant to be comprehensive and should be used as a tool to help the user understand and/or assess potential diagnostic and treatment options. It does NOT include all information about conditions, treatments, medications, side effects, or risks that may apply to a specific patient. It is not intended to be medical advice or a substitute for the medical advice, diagnosis, or treatment of a health care provider based on the health care provider's examination and assessment of a patient's specific and unique circumstances. Patients must speak with a health care provider for complete information about their health, medical questions, and treatment options, including any risks or benefits regarding use of medications. This information does not endorse any treatments or medications as safe, effective, or approved for treating a specific patient. UpToDate, Inc. and its affiliates disclaim any warranty or liability relating to this information or the use thereof.The use of this information is governed by the Terms of Use, available at https://www.woltersHabetuwer.com/en/know/clinical-effectiveness-terms. 2024© UpToDate, Inc. and its affiliates and/or licensors. All rights reserved.  Copyright   © 2024 UpToDate, Inc. and/or its affiliates. All rights reserved.    Patient Education     Routine physical for adults   The Basics   Written by the  "doctors and editors at [facility]   What is a physical? -- A physical is a routine visit, or \"check-up,\" with your doctor. You might also hear it called a \"wellness visit\" or \"preventive visit.\"  During each visit, the doctor will:   Ask about your physical and mental health   Ask about your habits, behaviors, and lifestyle   Do an exam   Give you vaccines if needed   Talk to you about any medicines you take   Give advice about your health   Answer your questions  Getting regular check-ups is an important part of taking care of your health. It can help your doctor find and treat any problems you have. But it's also important for preventing health problems.  A routine physical is different from a \"sick visit.\" A sick visit is when you see a doctor because of a health concern or problem. Since physicals are scheduled ahead of time, you can think about what you want to ask the doctor.  How often should I get a physical? -- It depends on your age and health. In general, for people age 21 years and older:   If you are younger than 50 years, you might be able to get a physical every 3 years.   If you are 50 years or older, your doctor might recommend a physical every year.  If you have an ongoing health condition, like diabetes or high blood pressure, your doctor will probably want to see you more often.  What happens during a physical? -- In general, each visit will include:   Physical exam - The doctor or nurse will check your height, weight, heart rate, and blood pressure. They will also look at your eyes and ears. They will ask about how you are feeling and whether you have any symptoms that bother you.   Medicines - It's a good idea to bring a list of all the medicines you take to each doctor visit. Your doctor will talk to you about your medicines and answer any questions. Tell them if you are having any side effects that bother you. You should also tell them if you are having trouble paying for any of your " "medicines.   Habits and behaviors - This includes:   Your diet   Your exercise habits   Whether you smoke, drink alcohol, or use drugs   Whether you are sexually active   Whether you feel safe at home  Your doctor will talk to you about things you can do to improve your health and lower your risk of health problems. They will also offer help and support. For example, if you want to quit smoking, they can give you advice and might prescribe medicines. If you want to improve your diet or get more physical activity, they can help you with this, too.   Lab tests, if needed - The tests you get will depend on your age and situation. For example, your doctor might want to check your:   Cholesterol   Blood sugar   Iron level   Vaccines - The recommended vaccines will depend on your age, health, and what vaccines you already had. Vaccines are very important because they can prevent certain serious or deadly infections.   Discussion of screening - \"Screening\" means checking for diseases or other health problems before they cause symptoms. Your doctor can recommend screening based on your age, risk, and preferences. This might include tests to check for:   Cancer, such as breast, prostate, cervical, ovarian, colorectal, prostate, lung, or skin cancer   Sexually transmitted infections, such as chlamydia and gonorrhea   Mental health conditions like depression and anxiety  Your doctor will talk to you about the different types of screening tests. They can help you decide which screenings to have. They can also explain what the results might mean.   Answering questions - The physical is a good time to ask the doctor or nurse questions about your health. If needed, they can refer you to other doctors or specialists, too.  Adults older than 65 years often need other care, too. As you get older, your doctor will talk to you about:   How to prevent falling at home   Hearing or vision tests   Memory testing   How to take your " medicines safely   Making sure that you have the help and support you need at home  All topics are updated as new evidence becomes available and our peer review process is complete.  This topic retrieved from [employer] on: May 02, 2024.  Topic 190019 Version 1.0  Release: 32.4.3 - C32.122  © 2024 UpToDate, Inc. and/or its affiliates. All rights reserved.  Consumer Information Use and Disclaimer   Disclaimer: This generalized information is a limited summary of diagnosis, treatment, and/or medication information. It is not meant to be comprehensive and should be used as a tool to help the user understand and/or assess potential diagnostic and treatment options. It does NOT include all information about conditions, treatments, medications, side effects, or risks that may apply to a specific patient. It is not intended to be medical advice or a substitute for the medical advice, diagnosis, or treatment of a health care provider based on the health care provider's examination and assessment of a patient's specific and unique circumstances. Patients must speak with a health care provider for complete information about their health, medical questions, and treatment options, including any risks or benefits regarding use of medications. This information does not endorse any treatments or medications as safe, effective, or approved for treating a specific patient. UpToDate, Inc. and its affiliates disclaim any warranty or liability relating to this information or the use thereof.The use of this information is governed by the Terms of Use, available at https://www.woltersApparcandouwer.com/en/know/clinical-effectiveness-terms. 2024© UpToDate, Inc. and its affiliates and/or licensors. All rights reserved.  Copyright   © 2024 UpToDate, Inc. and/or its affiliates. All rights reserved.

## 2025-03-05 NOTE — ASSESSMENT & PLAN NOTE
Wt Readings from Last 3 Encounters:   03/05/25 56.7 kg (125 lb)   11/15/24 57.3 kg (126 lb 6.4 oz)   10/18/24 56.7 kg (125 lb)   For now patient euvolemic CHF compensated continue low-salt diet fluid restriction Daily weight monitoring followed by cardiology    Cannot afford Entresto so continue follow-up plan finding medication as follows    CHF compensated euvolemic continue weight monitoring fluid restriction Daily weight monitoring     Losartan 50 mg daily     Toprol- mg daily to be seen by cardiology and switch to Entresto    Awaiting to be seen by cardiology
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
## 2025-03-11 ENCOUNTER — TELEPHONE (OUTPATIENT)
Age: 55
End: 2025-03-11

## 2025-03-11 NOTE — TELEPHONE ENCOUNTER
Contacted patient off of Talk Therapy  wait list to verify needs of services in attempts to update list with patient preferences. LVM for patient to contact intake dept  in regards to updating wait list.

## 2025-03-11 NOTE — TELEPHONE ENCOUNTER
Received call from patient regarding Talk Therapy . Writer verified Full Name and . Writer spoke with patient who refused services. Patient removed from wait list

## 2025-05-12 DIAGNOSIS — E03.9 HYPOTHYROIDISM, UNSPECIFIED TYPE: ICD-10-CM

## 2025-05-12 RX ORDER — LEVOTHYROXINE SODIUM 25 UG/1
25 TABLET ORAL DAILY
Qty: 90 TABLET | Refills: 1 | Status: SHIPPED | OUTPATIENT
Start: 2025-05-12

## 2025-05-13 DIAGNOSIS — I47.10 PSVT (PAROXYSMAL SUPRAVENTRICULAR TACHYCARDIA) (HCC): ICD-10-CM

## 2025-05-14 RX ORDER — METOPROLOL SUCCINATE 100 MG/1
100 TABLET, EXTENDED RELEASE ORAL DAILY
Qty: 90 TABLET | Refills: 1 | OUTPATIENT
Start: 2025-05-14

## 2025-06-04 DIAGNOSIS — I42.0 DILATED CARDIOMYOPATHY (HCC): ICD-10-CM

## 2025-06-04 DIAGNOSIS — I50.42 CHRONIC COMBINED SYSTOLIC AND DIASTOLIC HEART FAILURE, NYHA CLASS 2 (HCC): ICD-10-CM

## 2025-06-04 DIAGNOSIS — I47.10 PSVT (PAROXYSMAL SUPRAVENTRICULAR TACHYCARDIA) (HCC): ICD-10-CM

## 2025-06-04 RX ORDER — METOPROLOL SUCCINATE 100 MG/1
100 TABLET, EXTENDED RELEASE ORAL DAILY
Qty: 90 TABLET | Refills: 1 | OUTPATIENT
Start: 2025-06-04

## 2025-06-04 NOTE — TELEPHONE ENCOUNTER
Angelique Mason MA to [facility] (Selected Message)  VC      6/4/25  2:10 PM  Pls reject request as attempt to schedule pt has been made. Thank you

## 2025-06-05 NOTE — TELEPHONE ENCOUNTER
Caller: Emani     Doctor: Dr. Solorzano     Reason for call: pt was calling to schedule a Overdue follow up so she can get her metoprolol succinate (TOPROL-XL) 100 mg 24 hr tablet [[id number]] renewed . Our 1st available in Issue with Dr. Solorzano was for 12/16/25 she accepted.     She also mentioned that since she wont be seeing Dr. Solorzano till December to just be aware that she will also need her Losartan renewed eventually prior to that appt she has scheduled as well.     Call back#: [phone]

## 2025-06-06 RX ORDER — LOSARTAN POTASSIUM 50 MG/1
50 TABLET ORAL EVERY MORNING
Qty: 90 TABLET | Refills: 1 | Status: SHIPPED | OUTPATIENT
Start: 2025-06-06

## 2025-06-06 RX ORDER — METOPROLOL SUCCINATE 100 MG/1
100 TABLET, EXTENDED RELEASE ORAL DAILY
Qty: 90 TABLET | Refills: 1 | Status: SHIPPED | OUTPATIENT
Start: 2025-06-06

## 2025-06-09 DIAGNOSIS — F41.0 GENERALIZED ANXIETY DISORDER WITH PANIC ATTACKS: ICD-10-CM

## 2025-06-09 DIAGNOSIS — E03.9 HYPOTHYROIDISM, UNSPECIFIED TYPE: ICD-10-CM

## 2025-06-09 DIAGNOSIS — F41.1 GENERALIZED ANXIETY DISORDER WITH PANIC ATTACKS: ICD-10-CM

## 2025-06-09 RX ORDER — LEVOTHYROXINE SODIUM 25 UG/1
25 TABLET ORAL DAILY
Qty: 90 TABLET | Refills: 1 | Status: SHIPPED | OUTPATIENT
Start: 2025-06-09

## 2025-06-09 RX ORDER — ALPRAZOLAM 0.5 MG
0.5 TABLET ORAL 2 TIMES DAILY PRN
Qty: 60 TABLET | Refills: 0 | Status: SHIPPED | OUTPATIENT
Start: 2025-06-09 | End: 2025-06-17 | Stop reason: SDUPTHER

## 2025-06-09 NOTE — TELEPHONE ENCOUNTER
Reason for call:   [x] Refill   [] Prior Auth  [] Other:     Office:   [x] PCP/Provider - Vito  [] Specialty/Provider -     Medication: levothyroxine 25 mcg tablet     Dose/Frequency: 1 tablet daily    Quantity: 90    Pharmacy: [facility]    Does the patient have enough for 3 days?   [] Yes   [x] No - Send as HP to POD    Reason for call:   [x] Refill   [] Prior Auth  [] Other:     Office:   [x] PCP/Provider - Vito  [] Specialty/Provider -     Medication: ALPRAZolam (XANAX) 0.5 mg tablet     Dose/Frequency:  1 twice daily as needed    Quantity: 60    Pharmacy: [facility] [place]    Does the patient have enough for 3 days?   [] Yes   [x] No - Send as HP to POD

## 2025-06-17 ENCOUNTER — OFFICE VISIT (OUTPATIENT)
Dept: INTERNAL MEDICINE CLINIC | Facility: CLINIC | Age: 55
End: 2025-06-17
Payer: COMMERCIAL

## 2025-06-17 VITALS
DIASTOLIC BLOOD PRESSURE: 74 MMHG | OXYGEN SATURATION: 100 % | HEART RATE: 94 BPM | BODY MASS INDEX: 19.7 KG/M2 | WEIGHT: 133 LBS | SYSTOLIC BLOOD PRESSURE: 120 MMHG | HEIGHT: 69 IN

## 2025-06-17 DIAGNOSIS — Z12.31 ENCOUNTER FOR SCREENING MAMMOGRAM FOR BREAST CANCER: ICD-10-CM

## 2025-06-17 DIAGNOSIS — I50.42 CHRONIC COMBINED SYSTOLIC AND DIASTOLIC CHF (CONGESTIVE HEART FAILURE) (HCC): Primary | ICD-10-CM

## 2025-06-17 DIAGNOSIS — E03.9 HYPOTHYROIDISM, UNSPECIFIED TYPE: ICD-10-CM

## 2025-06-17 DIAGNOSIS — F41.1 GENERALIZED ANXIETY DISORDER WITH PANIC ATTACKS: ICD-10-CM

## 2025-06-17 DIAGNOSIS — F41.0 GENERALIZED ANXIETY DISORDER WITH PANIC ATTACKS: ICD-10-CM

## 2025-06-17 PROCEDURE — 99213 OFFICE O/P EST LOW 20 MIN: CPT | Performed by: INTERNAL MEDICINE

## 2025-06-17 RX ORDER — ALPRAZOLAM 0.5 MG
0.5 TABLET ORAL 2 TIMES DAILY PRN
Qty: 60 TABLET | Refills: 0 | Status: SHIPPED | OUTPATIENT
Start: 2025-06-17

## 2025-06-17 NOTE — ASSESSMENT & PLAN NOTE
Wt Readings from Last 3 Encounters:   06/17/25 60.3 kg (133 lb)   03/05/25 56.7 kg (125 lb)   11/15/24 57.3 kg (126 lb 6.4 oz)     Patient euvolemic CHF compensated agree and continue main medication as follow    Losartan 50 mg daily    Metoprolol  mg daily    Fluid restriction Daily weight monitoring low-salt diet follow-up with cardiology

## 2025-06-17 NOTE — ASSESSMENT & PLAN NOTE
On Xanax 0.5 twice a day as needed seems to be helping patient anxiety Check able to function and work full-time no side effects was refilled  Orders:  •  ALPRAZolam (XANAX) 0.5 mg tablet; Take 1 tablet (0.5 mg total) by mouth 2 (two) times a day as needed for anxiety

## 2025-06-17 NOTE — ASSESSMENT & PLAN NOTE
Lab Results   Component Value Date    [id number] 2.674 08/19/2024    TSH 6.650 (H) 03/28/2023   Asymptomatic agree continue levothyroxine 25 mcg daily TSH controlled

## 2025-06-17 NOTE — PROGRESS NOTES
Name: Emani Ch      : 1970      MRN: [id number]  Encounter Provider: Ugo Padron MD  Encounter Date: 2025   Encounter department: [facility] INTERNAL MEDICINE  :  Assessment & Plan  Encounter for screening mammogram for breast cancer    Orders:  •  Mammo screening bilateral w 3d and cad; Future    Chronic combined systolic and diastolic CHF (congestive heart failure) (HCC)  Wt Readings from Last 3 Encounters:   25 60.3 kg (133 lb)   25 56.7 kg (125 lb)   11/15/24 57.3 kg (126 lb 6.4 oz)     Patient euvolemic CHF compensated agree and continue main medication as follow    Losartan 50 mg daily    Metoprolol  mg daily    Fluid restriction Daily weight monitoring low-salt diet follow-up with cardiology                Hypothyroidism, unspecified type  Lab Results   Component Value Date    [id number] 2.674 2024    TSH 6.650 (H) 2023   Asymptomatic agree continue levothyroxine 25 mcg daily TSH controlled       Generalized anxiety disorder with panic attacks  On Xanax 0.5 twice a day as needed seems to be helping patient anxiety Check able to function and work full-time no side effects was refilled  Orders:  •  ALPRAZolam (XANAX) 0.5 mg tablet; Take 1 tablet (0.5 mg total) by mouth 2 (two) times a day as needed for anxiety           History of Present Illness   Came in follow-up for chronic medical condition listed visit diagnoses denies any chest pain difficulty breathing and a refill for Xanax for general anxiety with panic attack was refilled for details refer to assessment plan visit diagnosis      Review of Systems   Constitutional:  Negative for activity change, appetite change, chills, diaphoresis, fatigue, fever and unexpected weight change.   HENT:  Negative for congestion, dental problem, drooling, ear discharge, ear pain, facial swelling, hearing loss, mouth sores, nosebleeds, postnasal drip, rhinorrhea, sinus pressure, sneezing, sore  "throat, tinnitus, trouble swallowing and voice change.    Eyes:  Negative for photophobia, pain, discharge, redness, itching and visual disturbance.   Respiratory:  Negative for apnea, cough, choking, chest tightness, shortness of breath, wheezing and stridor.    Cardiovascular:  Negative for chest pain, palpitations and leg swelling.   Gastrointestinal:  Negative for abdominal distention, abdominal pain, anal bleeding, blood in stool, constipation, diarrhea, nausea, rectal pain and vomiting.   Endocrine: Negative for cold intolerance, heat intolerance, polydipsia, polyphagia and polyuria.   Genitourinary:  Negative for decreased urine volume, difficulty urinating, dysuria, enuresis, flank pain, frequency, genital sores, hematuria and urgency.   Musculoskeletal:  Negative for arthralgias, back pain, gait problem, joint swelling, myalgias, neck pain and neck stiffness.   Skin:  Negative for color change, pallor, rash and wound.   Allergic/Immunologic: Negative.  Negative for environmental allergies, food allergies and immunocompromised state.   Neurological:  Negative for dizziness, tremors, seizures, syncope, facial asymmetry, speech difficulty, weakness, light-headedness, numbness and headaches.   Psychiatric/Behavioral:  Positive for decreased concentration. Negative for agitation, behavioral problems, confusion, dysphoric mood, hallucinations, self-injury, sleep disturbance and suicidal ideas. The patient is nervous/anxious. The patient is not hyperactive.        Objective   /74   Pulse 94   Ht 5' 9\" (1.753 m)   Wt 60.3 kg (133 lb)   LMP  (LMP Unknown)   SpO2 100%   BMI 19.64 kg/m²      Physical Exam  Vitals reviewed.   Constitutional:       General: She is not in acute distress.     Appearance: She is well-developed. She is not ill-appearing, toxic-appearing or diaphoretic.   HENT:      Head: Normocephalic and atraumatic.      Right Ear: External ear normal.      Left Ear: External ear normal.      " Nose: Nose normal.      Mouth/Throat:      Pharynx: No oropharyngeal exudate.     Eyes:      General: Lids are normal. Lids are everted, no foreign bodies appreciated. No scleral icterus.        Right eye: No discharge.         Left eye: No discharge.      Conjunctiva/sclera: Conjunctivae normal.      Pupils: Pupils are equal, round, and reactive to light.     Neck:      Thyroid: No thyromegaly.      Vascular: Normal carotid pulses. No carotid bruit, hepatojugular reflux or JVD.      Trachea: No tracheal tenderness or tracheal deviation.     Cardiovascular:      Rate and Rhythm: Normal rate and regular rhythm.      Pulses: Normal pulses.      Heart sounds: Murmur heard.      No friction rub. No gallop.   Pulmonary:      Effort: Pulmonary effort is normal. No respiratory distress.      Breath sounds: Normal breath sounds. No stridor. No wheezing or rales.   Chest:      Chest wall: No tenderness.   Abdominal:      General: Bowel sounds are normal. There is no distension.      Palpations: Abdomen is soft. There is no mass.      Tenderness: There is no abdominal tenderness. There is no guarding or rebound.     Musculoskeletal:         General: No tenderness or deformity. Normal range of motion.      Cervical back: Normal range of motion and neck supple. No edema, erythema or rigidity. No spinous process tenderness or muscular tenderness. Normal range of motion.   Lymphadenopathy:      Head:      Right side of head: No submental, submandibular, tonsillar, preauricular or posterior auricular adenopathy.      Left side of head: No submental, submandibular, tonsillar, preauricular, posterior auricular or occipital adenopathy.      Cervical: No cervical adenopathy.      Right cervical: No superficial, deep or posterior cervical adenopathy.     Left cervical: No superficial, deep or posterior cervical adenopathy.      Upper Body:      Right upper body: No pectoral adenopathy.      Left upper body: No pectoral adenopathy.      Skin:     General: Skin is warm and dry.      Coloration: Skin is not pale.      Findings: No erythema or rash.     Neurological:      General: No focal deficit present.      Mental Status: She is alert and oriented to person, place, and time.      Cranial Nerves: No cranial nerve deficit.      Sensory: No sensory deficit.      Motor: No tremor, abnormal muscle tone or seizure activity.      Coordination: Coordination normal.      Gait: Gait normal.      Deep Tendon Reflexes: Reflexes are normal and symmetric. Reflexes normal.     Psychiatric:         Behavior: Behavior normal.         Thought Content: Thought content normal.         Judgment: Judgment normal.

## 2025-08-04 DIAGNOSIS — F41.1 GENERALIZED ANXIETY DISORDER WITH PANIC ATTACKS: ICD-10-CM

## 2025-08-04 DIAGNOSIS — F41.0 GENERALIZED ANXIETY DISORDER WITH PANIC ATTACKS: ICD-10-CM

## 2025-08-04 DIAGNOSIS — I47.10 PSVT (PAROXYSMAL SUPRAVENTRICULAR TACHYCARDIA) (HCC): ICD-10-CM

## 2025-08-04 RX ORDER — ALPRAZOLAM 0.5 MG
0.5 TABLET ORAL 2 TIMES DAILY PRN
Qty: 60 TABLET | Refills: 0 | Status: SHIPPED | OUTPATIENT
Start: 2025-08-04

## 2025-08-04 RX ORDER — METOPROLOL SUCCINATE 100 MG/1
100 TABLET, EXTENDED RELEASE ORAL DAILY
Qty: 90 TABLET | Refills: 1 | Status: SHIPPED | OUTPATIENT
Start: 2025-08-04

## 2025-08-05 ENCOUNTER — TELEPHONE (OUTPATIENT)
Age: 55
End: 2025-08-05